# Patient Record
Sex: MALE | Race: WHITE | NOT HISPANIC OR LATINO | Employment: FULL TIME | URBAN - METROPOLITAN AREA
[De-identification: names, ages, dates, MRNs, and addresses within clinical notes are randomized per-mention and may not be internally consistent; named-entity substitution may affect disease eponyms.]

---

## 2019-12-14 ENCOUNTER — HOSPITAL ENCOUNTER (OUTPATIENT)
Dept: INFUSION THERAPY | Facility: HOSPITAL | Age: 44
Setting detail: RECURRING SERIES
Discharge: HOME | End: 2019-12-31
Attending: INTERNAL MEDICINE

## 2020-01-15 ENCOUNTER — OFFICE VISIT CONVERTED (OUTPATIENT)
Dept: PULMONOLOGY | Facility: CLINIC | Age: 45
End: 2020-01-15
Attending: PHYSICIAN ASSISTANT

## 2020-01-15 ENCOUNTER — HOSPITAL ENCOUNTER (OUTPATIENT)
Dept: LAB | Facility: HOSPITAL | Age: 45
Discharge: HOME | End: 2020-01-15
Attending: PHYSICIAN ASSISTANT

## 2020-01-16 LAB
INR PPP: 1.37 (ref 2–3)
PROTHROMBIN TIME: 14.1 S (ref 9.4–12)

## 2021-01-13 ENCOUNTER — HOSPITAL ENCOUNTER (OUTPATIENT)
Dept: LAB | Facility: HOSPITAL | Age: 46
Discharge: HOME | End: 2021-01-13
Attending: NURSE PRACTITIONER

## 2021-01-13 ENCOUNTER — OFFICE VISIT CONVERTED (OUTPATIENT)
Dept: FAMILY MEDICINE CLINIC | Facility: CLINIC | Age: 46
End: 2021-01-13
Attending: NURSE PRACTITIONER

## 2021-01-13 LAB
ALBUMIN SERPL-MCNC: 3.7 G/DL (ref 3.5–5)
ALBUMIN/GLOB SERPL: 0.8 {RATIO} (ref 1.4–2.6)
ALP SERPL-CCNC: 103 U/L (ref 53–128)
ALT SERPL-CCNC: 21 U/L (ref 10–40)
ANION GAP SERPL CALC-SCNC: 18 MMOL/L (ref 8–19)
AST SERPL-CCNC: 18 U/L (ref 15–50)
BASOPHILS # BLD AUTO: 0.06 10*3/UL (ref 0–0.2)
BASOPHILS NFR BLD AUTO: 0.6 % (ref 0–3)
BILIRUB SERPL-MCNC: 0.63 MG/DL (ref 0.2–1.3)
BNP SERPL-MCNC: 1152 PG/ML (ref 0–450)
BUN SERPL-MCNC: 16 MG/DL (ref 5–25)
BUN/CREAT SERPL: 17 {RATIO} (ref 6–20)
CALCIUM SERPL-MCNC: 9.4 MG/DL (ref 8.7–10.4)
CHLORIDE SERPL-SCNC: 101 MMOL/L (ref 99–111)
CONV ABS IMM GRAN: 0.05 10*3/UL (ref 0–0.2)
CONV CO2: 23 MMOL/L (ref 22–32)
CONV IMMATURE GRAN: 0.5 % (ref 0–1.8)
CONV TOTAL PROTEIN: 8.2 G/DL (ref 6.3–8.2)
CREAT UR-MCNC: 0.96 MG/DL (ref 0.7–1.2)
DEPRECATED RDW RBC AUTO: 44.3 FL (ref 35.1–43.9)
EOSINOPHIL # BLD AUTO: 0.25 10*3/UL (ref 0–0.7)
EOSINOPHIL # BLD AUTO: 2.5 % (ref 0–7)
ERYTHROCYTE [DISTWIDTH] IN BLOOD BY AUTOMATED COUNT: 13.7 % (ref 11.6–14.4)
EST. AVERAGE GLUCOSE BLD GHB EST-MCNC: 223 MG/DL
GFR SERPLBLD BASED ON 1.73 SQ M-ARVRAT: >60 ML/MIN/{1.73_M2}
GLOBULIN UR ELPH-MCNC: 4.5 G/DL (ref 2–3.5)
GLUCOSE SERPL-MCNC: 211 MG/DL (ref 70–99)
HBA1C MFR BLD: 9.4 % (ref 3.5–5.7)
HCT VFR BLD AUTO: 52.2 % (ref 42–52)
HGB BLD-MCNC: 16.2 G/DL (ref 14–18)
LYMPHOCYTES # BLD AUTO: 1.76 10*3/UL (ref 1–5)
LYMPHOCYTES NFR BLD AUTO: 17.8 % (ref 20–45)
MCH RBC QN AUTO: 27.3 PG (ref 27–31)
MCHC RBC AUTO-ENTMCNC: 31 G/DL (ref 33–37)
MCV RBC AUTO: 87.9 FL (ref 80–96)
MONOCYTES # BLD AUTO: 0.57 10*3/UL (ref 0.2–1.2)
MONOCYTES NFR BLD AUTO: 5.8 % (ref 3–10)
NEUTROPHILS # BLD AUTO: 7.18 10*3/UL (ref 2–8)
NEUTROPHILS NFR BLD AUTO: 72.8 % (ref 30–85)
NRBC CBCN: 0 % (ref 0–0.7)
OSMOLALITY SERPL CALC.SUM OF ELEC: 293 MOSM/KG (ref 273–304)
PLATELET # BLD AUTO: 220 10*3/UL (ref 130–400)
PMV BLD AUTO: 10.3 FL (ref 9.4–12.4)
POTASSIUM SERPL-SCNC: 4.3 MMOL/L (ref 3.5–5.3)
RBC # BLD AUTO: 5.94 10*6/UL (ref 4.7–6.1)
SODIUM SERPL-SCNC: 138 MMOL/L (ref 135–147)
WBC # BLD AUTO: 9.87 10*3/UL (ref 4.8–10.8)

## 2021-01-22 ENCOUNTER — CONVERSION ENCOUNTER (OUTPATIENT)
Dept: CARDIOLOGY | Facility: CLINIC | Age: 46
End: 2021-01-22
Attending: INTERNAL MEDICINE

## 2021-01-27 ENCOUNTER — OFFICE VISIT CONVERTED (OUTPATIENT)
Dept: FAMILY MEDICINE CLINIC | Facility: CLINIC | Age: 46
End: 2021-01-27
Attending: NURSE PRACTITIONER

## 2021-01-27 ENCOUNTER — CONVERSION ENCOUNTER (OUTPATIENT)
Dept: FAMILY MEDICINE CLINIC | Facility: CLINIC | Age: 46
End: 2021-01-27

## 2021-01-28 ENCOUNTER — OFFICE VISIT CONVERTED (OUTPATIENT)
Dept: ONCOLOGY | Facility: HOSPITAL | Age: 46
End: 2021-01-28
Attending: INTERNAL MEDICINE

## 2021-01-28 ENCOUNTER — HOSPITAL ENCOUNTER (OUTPATIENT)
Dept: ONCOLOGY | Facility: HOSPITAL | Age: 46
Discharge: HOME | End: 2021-01-28
Attending: INTERNAL MEDICINE

## 2021-01-29 LAB
ANTI-CARDIOLIPIN IGG ANTIBODY: 12 GPL U/ML (ref 0–14)
ANTI-CARDIOLIPIN IGM ANTIBODY: <9 MPL U/ML (ref 0–12)

## 2021-02-02 LAB — F5 GENE MUT ANL BLD/T: NORMAL

## 2021-02-25 ENCOUNTER — CONVERSION ENCOUNTER (OUTPATIENT)
Dept: FAMILY MEDICINE CLINIC | Facility: CLINIC | Age: 46
End: 2021-02-25

## 2021-02-25 ENCOUNTER — OFFICE VISIT CONVERTED (OUTPATIENT)
Dept: FAMILY MEDICINE CLINIC | Facility: CLINIC | Age: 46
End: 2021-02-25
Attending: NURSE PRACTITIONER

## 2021-03-02 ENCOUNTER — OFFICE VISIT CONVERTED (OUTPATIENT)
Dept: CARDIOLOGY | Facility: CLINIC | Age: 46
End: 2021-03-02
Attending: INTERNAL MEDICINE

## 2021-04-26 ENCOUNTER — CONVERSION ENCOUNTER (OUTPATIENT)
Dept: FAMILY MEDICINE CLINIC | Facility: CLINIC | Age: 46
End: 2021-04-26

## 2021-04-26 ENCOUNTER — OFFICE VISIT CONVERTED (OUTPATIENT)
Dept: FAMILY MEDICINE CLINIC | Facility: CLINIC | Age: 46
End: 2021-04-26
Attending: NURSE PRACTITIONER

## 2021-04-26 ENCOUNTER — HOSPITAL ENCOUNTER (OUTPATIENT)
Dept: LAB | Facility: HOSPITAL | Age: 46
Discharge: HOME | End: 2021-04-26
Attending: NURSE PRACTITIONER

## 2021-04-26 LAB
ANION GAP SERPL CALC-SCNC: 17 MMOL/L (ref 8–19)
BUN SERPL-MCNC: 17 MG/DL (ref 5–25)
BUN/CREAT SERPL: 18 {RATIO} (ref 6–20)
CALCIUM SERPL-MCNC: 8.9 MG/DL (ref 8.7–10.4)
CHLORIDE SERPL-SCNC: 101 MMOL/L (ref 99–111)
CONV CO2: 25 MMOL/L (ref 22–32)
CREAT UR-MCNC: 0.95 MG/DL (ref 0.7–1.2)
EST. AVERAGE GLUCOSE BLD GHB EST-MCNC: 154 MG/DL
GFR SERPLBLD BASED ON 1.73 SQ M-ARVRAT: >60 ML/MIN/{1.73_M2}
GLUCOSE SERPL-MCNC: 113 MG/DL (ref 70–99)
HBA1C MFR BLD: 7 % (ref 3.5–5.7)
OSMOLALITY SERPL CALC.SUM OF ELEC: 290 MOSM/KG (ref 273–304)
POTASSIUM SERPL-SCNC: 4.1 MMOL/L (ref 3.5–5.3)
SODIUM SERPL-SCNC: 139 MMOL/L (ref 135–147)

## 2021-05-10 NOTE — H&P
History and Physical      Patient Name: Con Colón   Patient ID: [id number]   Sex: Male   YOB: 1975    Primary Care Provider: Laurie READ   Referring Provider: Laurie READ    Visit Date: March 2, 2021    Provider: Margarito Yuan MD   Location: [facility]   Location Address: [address] [place] [address], [address]   [place], [place]  [id number]   Location Phone: [phone]          Chief Complaint     Shortness of breath.    Enlarged heart.       History Of Present Illness  Consult requested by: Laurie READ   Con Colón is a 46 year old /White male with a history of super morbid obesity (BMI in the office today was 66.3), hypertension, borderline type 2 diabetes mellitus, and recurrent pulmonary emboli, now on chronic anticoagulation with Xarelto 20 mg daily. He was treated at [facility] in January 2021 for submassive pulmonary embolism and both his CTA of the chest and echocardiogram at that time revealed evidence of significant right heart strain and right ventricular enlargement. His estimated pulmonary arterial pressures were also elevated on the echo at that time. He states his first pulmonary embolism occurred clear back in 1995, and he admits to medication noncompliance with anticoagulation in the past. He states he would run out of anticoagulant medication and people would refuse to fill it for him. Since January, he does report good compliance with Xarelto and states he has been taking it regularly. Mr. Colón does not recall ever having any workup for hypercoagulable state, but states he was a  for 15 years and admits to extremely limited physical activity. He has had numerous DVTs throughout adulthood, and he is now agreeable to be maintained on long-term anticoagulation with Xarelto. His recent echocardiogram actually showed normal left ventricular systolic function and wall motion with no significant valvular disease. However,  he did have a moderately enlarged right ventricle and mildly reduced right ventricular systolic function, likely all secondary to cor pulmonale/chronic thromboembolic pulmonary hypertension. He is not currently seeing a pulmonary physician. Mr. Colón also reports a long-standing history of obstructive sleep apnea, and states he has been on CPAP therapy for approximately 10 years. He does report excellent compliance with CPAP therapy nightly. He has not experienced any episodes of chest pain/pressure or any palpitations, PND, lightheadedness, or syncope. The patient does have chronic moderate-to-severe bilateral lower extremity edema, which has been present for years and is likely multifactorial in etiology. He does not report any recent increase in his chronic lower extremity edema. The patient's recent EKG from 02/25/2021 was reviewed and shows sinus rhythm with right bundle branch block, left anterior fascicular block, low QRS voltage in the precordial leads, and a nonspecific T-wave abnormality in the anterolateral leads. His blood pressure was elevated in the office today at 146/86.   PAST MEDICAL & SURGICAL HISTORY: Recurrent pulmonary emboli and DVTs; Obstructive sleep apnea, on chronic CPAP therapy; COPD with chronic ongoing tobacco abuse; Hypertension; Super morbid obesity with a BMI of 66.3 today; Hyperlipidemia; Right ventricular enlargement/failure secondary to cor pulmonale; Type 2 diabetes mellitus; No prior surgeries reported.   PSYCHOSOCIAL HISTORY: He continues to smoke and states he smokes approximately 1 pack of cigarettes daily. No history of alcohol abuse or illicit drug use reported. He is  and lives alone.   FAMILY HISTORY: Negative for premature coronary artery disease. Positive for diabetes mellitus in his mother.   CURRENT MEDICATIONS: Xarelto 20 mg daily; metformin 1,000 mg b.i.d.; lisinopril 40 mg daily.   ALLERGIES: No known drug allergies.       Review of  "Systems  · Constitutional  o Admits  o : good general health lately, recent weight changes   o Denies  o : fatigue  · Eyes  o Denies  o : blurred vision  · HENT  o Denies  o : hearing loss or ringing, chronic sinus problem, swollen glands in neck  · Cardiovascular  o Admits  o : shortness of breath with activities   o Denies  o : chest pain, palpitations (fast, fluttering, or skipping beats), swelling (feet, ankles, hands)  · Respiratory  o Admits  o : asthma or wheezing, COPD  o Denies  o : chronic or frequent cough  · Gastrointestinal  o Denies  o : ulcers, nausea or vomiting  · Neurologic  o Denies  o : lightheaded or dizzy, stroke, headaches  · Musculoskeletal  o Denies  o : joint pain, back pain  · Endocrine  o Admits  o : diabetes  o Denies  o : thyroid disease, heat or cold intolerance, excessive thirst or urination  · Heme-Lymph  o Denies  o : bleeding or bruising tendency, anemia      Vitals  Date Time BP Position Site L\R Cuff Size HR RR TEMP (F) WT  HT  BMI kg/m2 BSA m2 O2 Sat FR L/min FiO2 HC       03/02/2021 10:56 /86 Sitting    82 - R   448lbs 16oz 5'  9\" 66.31 3.15       03/02/2021 10:56 /86 Sitting                       Physical Examination  · Constitutional  o Appearance  o : Well-developed, well-nourished, morbidly obese, alert, oriented x3, in no acute distress.  · Head and Face  o HEENT  o : Atraumatic. Pupils equal, round, and reactive to light. No scleral icterus. Conjunctiva are normal. Mucous membranes are moist.   · Neck  o Inspection/Palpation  o : Supple. No masses.   o Jugular Veins  o : No JVD. No carotid bruit.  · Respiratory  o Auscultation of Lungs  o : Clear to auscultation bilaterally with decreased air movement in the bases and lower lung fields bilaterally. Prolonged expiratory phase. No wheezing, or rales. No tachypnea. Normal effort with no accessory muscle use.   · Cardiovascular  o Heart  o : Regular rate and rhythm. Distant heart sounds. S1 and S2 present. No S3 " or S4 gallop. No murmur or friction rub appreciated.  · Gastrointestinal  o Abdominal Examination  o : Soft, obese, nondistended, nontender. Normal bowel sounds throughout. No masses. No hepatomegaly.  · Lymphatic  o Neck  o :   o Axilla  o : No cervical or axillary lymphadenopathy.   · Skin and Subcutaneous Tissue  o General Inspection  o : Warm and dry. No jaundice. No rashes or lesions. Normal skin turgor.   · Neurologic  o Cranial Nerves  o : Normal speech. Cranial nerves II-XII grossly intact. No focal motor deficits.   · Extremities  o Extremities  o : No cyanosis, or clubbing. 2 to 3+ bilateral lower extremity edema to the knee. 2+ radial pulses bilaterally.   · EKG  o EKG  o : EKG from 02/25/2021 was reviewed and showed sinus rhythm with a heart rate of 87 beats per minute, right bundle branch block, left anterior fascicular block, low QRS voltage in the precordial leads, and a diffuse nonspecific T-wave abnormality, as well as a prolonged QT interval.  · Labs  o Labs  o : Labs from 11/16/2020 were reviewed. CBC: WBC 9.33, hemoglobin 14.8, hematocrit 46.2, platelets 196,000. Chemistry: Sodium 149, potassium 4.2, chloride 104, bicarb 24, BUN 11, creatinine 0.96, glucose 204. LFTs were within normal limits. BNP was elevated at that time at 1325.          Assessment     1.  Super morbid obesity with a BMI of 66.3 today.  2.  History of recurrent DVTs and pulmonary emboli, now finally compliant with chronic anticoagulation with        Xarelto.    3.  Moderately enlarged right ventricle with mildly reduced right ventricular systolic function and evidence of        pulmonary hypertension on the patient's recent echocardiogram.  These findings are likely secondary to cor        pulmonale/chronic thromboembolic pulmonary hypertension from his multiple previous PEs.    4.  Type 2 diabetes mellitus.  5.  Essential hypertension.  6.  Chronic ongoing tobacco abuse.  7.  Chronic exertional dyspnea, class III.  8.   Obstructive sleep apnea, on chronic CPAP therapy.       Plan     1.  His chronic exertional dyspnea is likely secondary to his multiple pulmonary issues and cor pulmonale, but        could represent an anginal equivalent.  Unfortunately, his weight exceeds the table limit for a nuclear stress        test, a stress echocardiogram, or a coronary CTA.  Accordingly, his only option for cardiac ischemic        evaluation would be an invasive catheterization, which he wishes to defer at this time.  I did inform him        that he would likely benefit from a right heart catheterization at some point to definitively diagnosis his        pulmonary hypertension and determine the severity of it.  I recommended a Pulmonary workup for chronic        thromboembolic pulmonary hypertension and a followup in the Pulmonary Clinic, which he declines at        present.   2.  He does have evidence of volume overload today and is willing to start a trial of low-dose Lasix.  He states        he has been on this medication in the past that he took from one of his friends.  We will start Lasix 40 mg        daily with potassium chloride supplementation.  Unfortunately, besides diuresis, I have little to offer him at        present for his chronic right-sided heart failure.    3.  He does understand the importance of strict compliance with chronic anticoagulation with Xarelto, as well        as continuous strict compliance with CPAP therapy.  We did review the long-term complications of        untreated sleep apnea, including atrial fibrillation and other arrhythmias, as well as worsening pulmonary        hypertension.  4.  Continue lisinopril at his current dose.  5.  Significant weight loss was strongly recommended and some strategies were reviewed to assist with this        goal.    6.  I will plan to see him back in the office in approximately 6 months for reassessment and told him to call if        he has new problems in the  meantime.      Margarito Yuan MD  BP:vm                  Electronically Signed by: Stephanie Frank-, Other -Author on March 8, 2021 05:09:06 PM  Electronically Co-signed by: Margarito Yuan MD -Reviewer on March 30, 2021 03:06:37 PM

## 2021-05-10 NOTE — PROCEDURES
"   Procedure Note      Patient Name: Con Colón   Patient ID: [id number]   Sex: Male   YOB: 1975    Primary Care Provider: Laurie READ   Referring Provider: Laurie READ    Visit Date: January 22, 2021    Provider: Marija Anne MD   Location: [facility]   Location Address: [address] [place] [address], [address]   [place], [place]  [id number]   Location Phone: [phone]          FINAL REPORT   TRANSTHORACIC ECHOCARDIOGRAM REPORT    Diagnosis: Shortness of breath   Height: 5'11\" Weight: 459 B/P: 182/109 BSA: 3.0   Tech: BNS   MEASUREMENTS:  RVID (Diastole) : RVID. (NORMAL: 0.7 to 2.4 cm max)   LVID (Systole): 4.3 cm (Diastole): 5.3 cm . (NORMAL: 3.7 - 5.4 cm)   Posterior Wall Thickness (Diastole): 1.6 cm. (NORMAL: 0.8 - 1.1 cm)   Septal Thickness (Diastole): 1.6 cm. (NORMAL: 0.7 - 1.2 cm)   LAID (Systole): 4.3 cm. (NORMAL: 1.9 - 3.8 cm)   Aortic Root Diameter (Diastole): 3.7 cm. (NORMAL: 2.0 - 3.7 cm)   COMMENTS:  The patient underwent 2-D, M-Mode, and Doppler examination, including pulse-wave, continuous-wave, and color-flow analysis; the study is technically limited. Lumason contrast was used for better left ventricle endocardial definition. The patient was administered 4 mL of 5 mL Lumason intravenously.   FINDINGS:  MITRAL VALVE: Normal. E to F slope was normal. No evidence of any prolapse.   AORTIC VALVE: Appears normal., trileaflet, opening adequately.   TRICUSPID VALVE: Not well seen.   PULMONIC VALVE: Not seen.   LEFT ATRIUM: Normal; no masses seen. LA volume is 18 mL/m2.   AORTIC ROOT: Normal in size and motion.   LEFT VENTRICLE: The left ventricular chamber size is normal. The left ventricular wall thickness is normal. The left ventricular systolic function is normal with an estimated EF of 60%. No significant regional wall motion abnormalities are identified.   RIGHT ATRIUM: Enlarged.   RIGHT VENTRICLE: Enlarged.   PERICARDIUM: No effusion.   INFERIOR VENA CAVA: Diameter " is 2.2 cm with less than 50% reduction with inspiration.   DOPPLER: Pulse-wave, continuous wave, and color-flow Doppler evaluation was performed. E/A ratio is 0.8. DT= 194 msec. E/E' is 5. The study is technically limited. No definite pulmonary abnormalities noted.   Faxed: 01/27/2021      CONCLUSION:  1.  Technically limited echocardiographic study.   2.  Left ventricular chamber size is normal. The left ventricular wall thickness is normal. The left ventricular        systolic function is normal with an estimated EF of 60%. No significant regional wall motion abnormalities        are identified.   3.  Left ventricular diastolic dysfunction.   4.  Right ventricle enlargement with elevated right atrial pressure.       Marija Anne MD, [facility]  PM/pap                     Electronically Signed by: Bhavani Hernandez-, Other -Author on January 27, 2021 11:23:10 AM  Electronically Co-signed by: Marija Anne MD -Reviewer on January 31, 2021 12:12:08 PM

## 2021-05-14 VITALS
SYSTOLIC BLOOD PRESSURE: 146 MMHG | HEART RATE: 82 BPM | WEIGHT: 315 LBS | DIASTOLIC BLOOD PRESSURE: 86 MMHG | BODY MASS INDEX: 46.65 KG/M2 | HEIGHT: 69 IN

## 2021-05-14 VITALS
SYSTOLIC BLOOD PRESSURE: 118 MMHG | RESPIRATION RATE: 22 BRPM | BODY MASS INDEX: 44.1 KG/M2 | DIASTOLIC BLOOD PRESSURE: 72 MMHG | OXYGEN SATURATION: 92 % | HEIGHT: 71 IN | WEIGHT: 315 LBS | HEART RATE: 90 BPM

## 2021-05-14 VITALS
DIASTOLIC BLOOD PRESSURE: 107 MMHG | SYSTOLIC BLOOD PRESSURE: 168 MMHG | HEART RATE: 102 BPM | SYSTOLIC BLOOD PRESSURE: 154 MMHG | WEIGHT: 315 LBS | BODY MASS INDEX: 44.1 KG/M2 | DIASTOLIC BLOOD PRESSURE: 92 MMHG | HEIGHT: 71 IN | OXYGEN SATURATION: 92 %

## 2021-05-14 VITALS
BODY MASS INDEX: 44.1 KG/M2 | DIASTOLIC BLOOD PRESSURE: 94 MMHG | HEIGHT: 71 IN | SYSTOLIC BLOOD PRESSURE: 151 MMHG | OXYGEN SATURATION: 92 % | HEART RATE: 97 BPM | WEIGHT: 315 LBS

## 2021-05-14 VITALS
DIASTOLIC BLOOD PRESSURE: 109 MMHG | WEIGHT: 315 LBS | SYSTOLIC BLOOD PRESSURE: 182 MMHG | HEART RATE: 113 BPM | OXYGEN SATURATION: 90 % | BODY MASS INDEX: 44.1 KG/M2 | HEIGHT: 71 IN

## 2021-05-14 NOTE — PROGRESS NOTES
Progress Note      Patient Name: Con Colón   Patient ID: [id number]   Sex: Male   YOB: 1975    Primary Care Provider: Laurie READ   Referring Provider: Laurie READ    Visit Date: January 27, 2021    Provider: JACEK Aguayo   Location: [facility]   Location Address: [address] [place] [address], [address]  [place], [place]  [id number]   Location Phone: [phone]          Chief Complaint  · 2 Week F/U      History Of Present Illness  Con Colón is a 46 year old /White male who presents for evaluation and treatment of:      Patient is here for a 2 week follow-up.   states he has been taking all 3 meds as prescribed and tolerating well   Patient says they told him he had an enlarged heart at the cardiologist   Patient says his medicine is working okay.       Past Medical History  Disease Name Date Onset Notes   Cardiomegaly --  --    COPD (chronic obstructive pulmonary disease) --  --    Hypertension --  --    Pulmonary embolism: past --  --    Sleep apnea --  --          Medication List  Name Date Started Instructions   lisinopril 20 mg oral tablet 01/27/2021 take 1 tablet (20 mg) by oral route once daily for 30 days   metformin 1,000 mg oral tablet 01/21/2021 take 1 tablet PO QD for 1 week, then BID thereafter   Xarelto 15 mg oral tablet  take 1 tablet (15 mg) by oral route 2 times per day with food for 21 days   Xarelto 20 mg oral tablet 01/21/2021 take 1 tablet (20 mg) by oral route once daily with the evening meal       Allergies Reconciled  Family Medical History  Disease Name Relative/Age Notes   Family history of diabetes mellitus  --          Social History  Finding Status Start/Stop Quantity Notes   Alcohol Never --/-- --  --    Sedentary --  --/-- --  --    Tobacco Current every day --/-- --  --          Immunizations  NameDate Admin Mfg Trade Name Lot Number Route Inj VIS Given VIS Publication   InfluenzaRefused 01/27/2021 NE Not  "Entered  NE NE     Comments:          Review of Systems  · Constitutional  o Denies  o : fatigue, night sweats  · Eyes  o Denies  o : double vision, blurred vision  · HENT  o Denies  o : vertigo, recent head injury  · Breasts  o Denies  o : abnormal changes in breast size, additional breast symptoms except as noted in the HPI  · Cardiovascular  o Denies  o : chest pain, irregular heart beats  · Respiratory  o Denies  o : shortness of breath, productive cough  · Gastrointestinal  o Denies  o : nausea, vomiting  · Genitourinary  o Denies  o : dysuria, urinary retention  · Integument  o Denies  o : hair growth change, new skin lesions  · Neurologic  o Denies  o : altered mental status, seizures  · Musculoskeletal  o Denies  o : joint swelling, limitation of motion  · Endocrine  o Denies  o : cold intolerance, heat intolerance  · Heme-Lymph  o Denies  o : petechiae, lymph node enlargement or tenderness  · Allergic-Immunologic  o Denies  o : frequent illnesses      Vitals  Date Time BP Position Site L\R Cuff Size HR RR TEMP (F) WT  HT  BMI kg/m2 BSA m2 O2 Sat FR L/min FiO2 HC       01/13/2021 08:54 /109 Sitting    113 - R   459lbs 4oz 5'  11\" 64.05 3.23 90 %  21%    01/27/2021 09:04 /94 Sitting    97 - R   448lbs 2oz 5'  11\" 62.5 3.19 92 %  21%          Physical Examination  · Constitutional  o Appearance  o : well developed, well-nourished, no acute distress  · Head and Face  o Head  o : normocephalic, atraumatic  · Ears, Nose, Mouth and Throat  o Ears  o :   § External Ears  § : external auditory canal appearance normal, no discharge present  § Otoscopic Examination  § : tympanic membranes pearly white/gray bilaterally  o Nose  o :   § External Nose  § : no lesions noted  § Nasopharynx  § : no discharge present  o Oral Cavity  o :   § Oral Mucosa  § : oral mucosa light pink  o Throat  o :   § Oropharynx  § : tonsils without exudate, no palatal petechiae  · Neck  o Inspection/Palpation  o : normal " appearance, no masses or tenderness, trachea midline  o Thyroid  o : gland size normal, nontender, no nodules or masses present on palpation  · Respiratory  o Respiratory Effort  o : breathing unlabored  o Inspection of Chest  o : chest rise symmetric bilaterally  o Auscultation of Lungs  o : clear to auscultation bilaterally throughout inspiration and expiration  · Cardiovascular  o Heart  o :   § Auscultation of Heart  § : regular rate and rhythm, no murmurs, gallops or rubs  o Peripheral Vascular System  o :   § Extremities  § : no edema  · Lymphatic  o Neck  o : no cervical lymphadenopathy, no supraclavicular lymphadenopathy  · Psychiatric  o Mood and Affect  o : mood normal, affect appropriate          Assessment  · Screening for depression     V79.0/Z13.89  · Diabetes mellitus, type 2     250.00/E11.9  · Essential hypertension     401.9/I10  still not at goal, increase lisinopril to 20mg QD   · Long term current use of anticoagulant     V58.61/Z79.01  · Obesity     278.00/E66.9  · Pulmonary embolism     415.19/I26.99      Plan  · Orders  o Annual depression screening, 15 minutes (, 64093) - V79.0/Z13.89 - 01/27/2021  o ACO-18: Negative screen for clinical depression using a standardized tool () - V79.0/Z13.89 - 01/27/2021  o ACO-39: Current medications updated and reviewed (, 1159F) - - 01/27/2021  · Medications  o lisinopril 20 mg oral tablet   SIG: take 1 tablet (20 mg) by oral route once daily for 30 days   DISP: (30) Tablet with 0 refills  Adjusted on 01/27/2021     o Medications have been Reconciled  o Transition of Care or Provider Policy  · Instructions  o Depression Screen completed and scanned into the EMR under the designated folder within the patient's documents.  o Today's PHQ-9 result is _0__  o Patient advised to monitor blood pressure (B/P) at home and journal readings. Patient informed that a B/P reading at home of more than 130/80 is considered hypertension. For readings greater  vbhe300/90 or higher patient is advised to follow up in the office with readings for management. Patient advised to limit sodium intake.  o Take all medications as prescribed/directed.  o Patient was educated/instructed on their diagnosis, treatment and medications prior to discharge from the clinic today.  o Patient instructed to seek medical attention urgently for new or worsening symptoms.  o Call the office with any concerns or questions.  o Chronic conditions reviewed and taken into consideration for today's treatment plan.  o Discussed Covid-19 precautions including, but not limited to, social distancing, avoid touching your face, and hand washing.   · Disposition  o Call or Return if symptoms worsen or persist.  o Follow Up PRN  o Follow Up in 1 month     cont current meds with increase dose lisinopril ,again stressed importance of compliance and weight loss.             Electronically Signed by: JACEK Aguayo -Author on January 27, 2021 01:43:45 PM

## 2021-05-14 NOTE — PROGRESS NOTES
"   Progress Note      Patient Name: Con Colón   Patient ID: [id number]   Sex: Male   YOB: 1975    Primary Care Provider: aLurie READ   Referring Provider: Laurie READ    Visit Date: 2021    Provider: JACEK Aguayo   Location: [facility]   Location Address: [address] [place] [address], [address]  [place], [place]  [id number]   Location Phone: [phone]          Chief Complaint  · New Patient  · Establish Care      History Of Present Illness  Con Colón is a 45 year old /White male who presents for evaluation and treatment of:      Patient is here to establish care. Patient was hospitalized at [facility]  for a PE. Patient was released and put on Warfrin and lovanox..   Patient states he is out of Warfrin and the blood thinner. Patient has been out of the medications for 1&1/2 month. 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   last time pt had a PCP was .     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    pt states DOT examiner told him he had glucose in urine and needed an A1C. pt states no personal hx of diabetes, does have family hx.     pt states hx of smoking but quit 1 year ago.    pt states hx of HTN, stopped med due to BP getting too low once and never restarted. that's was 3-4 years ago.    pt has had a prior PE, around 2019. states took Xarelto for a while then quit. unsure why he developed PE, states never had work-up to his knowledge, states \"they told me it was because I smoked\"       Past Medical History  Disease Name Date Onset Notes   Cardiomegaly --  --    COPD (chronic obstructive pulmonary disease) --  --    Hypertension --  --    Pulmonary embolism: past --  --    Sleep apnea --  --          Family Medical History  Disease Name Relative/Age Notes   Family history of diabetes mellitus  --          Social " "History  Finding Status Start/Stop Quantity Notes   Alcohol Never --/-- --  --    Sedentary --  --/-- --  --    Tobacco Current every day --/-- --  --          Review of Systems  · Constitutional  o Denies  o : fatigue, night sweats  · Eyes  o Denies  o : double vision, blurred vision  · HENT  o Denies  o : vertigo, recent head injury  · Breasts  o Denies  o : abnormal changes in breast size, additional breast symptoms except as noted in the HPI  · Cardiovascular  o Denies  o : chest pain, irregular heart beats  · Respiratory  o Admits  o : SOA with exertion   o Denies  o : productive cough  · Gastrointestinal  o Denies  o : nausea, vomiting  · Genitourinary  o Denies  o : dysuria, urinary retention  · Integument  o Denies  o : hair growth change, new skin lesions  · Neurologic  o Denies  o : altered mental status, seizures  · Musculoskeletal  o Denies  o : joint swelling, limitation of motion  · Endocrine  o Denies  o : cold intolerance, heat intolerance  · Heme-Lymph  o Denies  o : petechiae, lymph node enlargement or tenderness  · Allergic-Immunologic  o Denies  o : frequent illnesses      Vitals  Date Time BP Position Site L\R Cuff Size HR RR TEMP (F) WT  HT  BMI kg/m2 BSA m2 O2 Sat FR L/min FiO2 HC       01/13/2021 08:54 /109 Sitting    113 - R   459lbs 4oz 5'  11\" 64.05 3.23 90 %  21%           180/100       Physical Examination  · Constitutional  o Appearance  o : well developed, no acute distress, morbidly obese, disheveled appearance, strong body odor noted   · Head and Face  o Head  o : normocephalic, atraumatic  · Neck  o Inspection/Palpation  o : normal appearance, no masses or tenderness, trachea midline  o Thyroid  o : gland size normal, nontender, no nodules or masses present on palpation  · Respiratory  o Respiratory Effort  o : breathing unlabored  o Inspection of Chest  o : chest rise symmetric bilaterally  o Auscultation of Lungs  o : clear to auscultation bilaterally throughout inspiration " and expiration  · Cardiovascular  o Heart  o :   § Auscultation of Heart  § : regular rate and rhythm, no murmurs, gallops or rubs  o Peripheral Vascular System  o :   § Extremities  § : 2 plus pitting edema bilat with chronic appearing stasis dermatitis noted   · Lymphatic  o Neck  o : no cervical lymphadenopathy, no supraclavicular lymphadenopathy  · Psychiatric  o Mood and Affect  o : mood normal, affect appropriate          Assessment  · COPD (chronic obstructive pulmonary disease)     496/J44.9  · Essential hypertension     401.9/I10  start lisinopril 10mg, lower-end dose that I suspect will need to be titrated upward, but since pt reports hx of hypotensive episodes while on BP meds (unsure what it was) we will start with a lower dose.   · Obesity     278.00/E66.9  morbid- stressed need for diet/exercise changes and weight loss. pt states has gotten weight down to around 350 before, will work on it.   · Establishing care with new doctor, encounter for     V65.8/Z76.89  · Pulmonary embolism     415.19/I26.99  explained to pt that leaving this untreated as he has can lead to further complications including death. stressed to pt that he needs to get his health problems under control and take responsibility for his health. Pt states he has 3 kids to raise and needs to still be around for that so he will work on this .  · Stasis dermatitis of both legs     454.1/I87.2  pt states wears compression hose sometimes. Informed pt this problem is greatly exacerbated by his morbid obesity and that I highly recc weight loss.   · Pitting edema     782.3/R60.9  · Glucosuria     791.5/R81  pt states he has had glucose in his urine for years at DOT checks but that he is not diabetic. Informed pt that if he is frequently spilling glucose in his urine he is most likely diabetic and that this must be gotten under control quickly as uncontrolled diabetes could disqualify him from truck-driving. will check A1C today. pt JEREMY.    · Abnormal glucose     790.29/R73.09  · Non compliance w medication regimen     V1/Z.14  pt with extensive hx of non-compliance, admits will not be able to do PT/INR checks as needed for coumadin therapy- will initiate Xarelto for easiest dosing and hopefully compliance. stressed to pt that he must take anti-coag as prescribed and that he is literally risking his life if not.   · Non compliance with medical treatment     V1/Z.  pt has canclled or re-sched this appt 3 times from initial appt in November and has been on no anticoagulation (was only given 2 week supply in ER) or sought any medical treatment during that time. pt states he came today because they would not renew his DOT certificate when it  . I explained to pt this is non-compliance and that ultimately he is doing nothing but further hurting himself. 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 Instructed pt he not to return to  until his health is under control and he can be deemed safe to do so.   · SOB (shortness of breath)     786.05/R06.02      Plan  · Orders  o CBC with Auto Diff [facility] ([id number]) - 401.9/I10 - 2021  o CMP [facility] ([id number]) - 401.9/I10 - 2021  o Hgb A1c [facility] ([id number]) - - 2021  o ACO-39: Current medications updated and reviewed (1159F, ) - - 2021  o BNP blood (17475) - - 2021  o Echocardiogram - Complete [facility] ([id number], 69639, 75082) - 415.19/I26.99, 782.3/R60.9, 786.05/R06.02 - 2021  o Hematology / Oncology Consult (HEMOC) - 415.19/I26.99 - 2021   pt with hx 2 PEs, no hx of coag work-up per pt report   · Medications  o Medications have been Reconciled  o Transition of Care or Provider Policy  · Instructions  o Patient advised to monitor blood pressure (B/P) at home and journal readings. Patient informed that a B/P reading at home of more than  130/80 is considered hypertension. For readings greater gubw021/90 or higher patient is advised to follow up in the office with readings for management. Patient advised to limit sodium intake.  o Take all medications as prescribed/directed.  o Patient was educated/instructed on their diagnosis, treatment and medications prior to discharge from the clinic today.  o Patient instructed to seek medical attention urgently for new or worsening symptoms.  o Call the office with any concerns or questions.  o Chronic conditions reviewed and taken into consideration for today's treatment plan.  o Discussed Covid-19 precautions including, but not limited to, social distancing, avoid touching your face, and hand washing.   · Disposition  o Call or Return if symptoms worsen or persist.  o Follow Up PRN  o Follow Up in 2 weeks  o Follow Up in 1 month            Electronically Signed by: JACEK Aguayo -Author on January 14, 2021 10:27:04 AM

## 2021-05-14 NOTE — PROGRESS NOTES
"   Progress Note      Patient Name: Con Colón   Patient ID: [id number]   Sex: Male   YOB: 1975    Primary Care Provider: Laurie READ   Referring Provider: Laurie READ    Visit Date: February 25, 2021    Provider: JACEK Aguayo   Location: [facility] [place] [facility]   Location Address: [address] [place] [address], [address]  [place], [place]  [id number]   Location Phone: [phone]          Chief Complaint  · 1 month f/u      History Of Present Illness  Con Colón is a 46 year old /White male who presents for evaluation and treatment of:      Patient is here for a 1 month follow-up.    DM2- Patient states he has been checking his blood sugars and they have been around 115-140s   PE- Patient was also started on Xarelto. Patient states he is doing well on it.  HTN- Patient's Lisinopril dosage was increased. Patient does not check his blood pressure at home.     Patient states all he does is sit and watch tv. Patient states he gets winded if he does much else.     Patient states he has a hernia. states he was told about it in the hospital, denies any pain from it, just thought he would mention it.     Patient also has disability paperwork he wants filled out.    pt noted to get very SOB just coming down the [place] [address]- he however denies any worsening SOB and states \"I have COPD I have been like this for a long time\"       Past Medical History  Disease Name Date Onset Notes   Cardiomegaly --  --    COPD (chronic obstructive pulmonary disease) --  --    Hypertension --  --    Pulmonary embolism: past --  --    Sleep apnea --  --          Medication List  Name Date Started Instructions   lisinopril 40 mg oral tablet 02/25/2021 take 1 tablet (40 mg) by oral route once daily for 30 days   metformin 1,000 mg oral tablet 01/21/2021 take 1 tablet PO QD for 1 week, then BID thereafter   Xarelto 20 mg oral tablet 01/21/2021 take 1 tablet (20 mg) by oral route once daily with " "the evening meal       Allergies Reconciled  Family Medical History  Disease Name Relative/Age Notes   Family history of diabetes mellitus  --          Social History  Finding Status Start/Stop Quantity Notes   Alcohol Never --/-- --  --    Sedentary --  --/-- --  --    Tobacco Current every day --/-- --  --          Immunizations  NameDate Admin Mfg Trade Name Lot Number Route Inj VIS Given VIS Publication   InfluenzaRefused 01/27/2021 NE Not Entered  NE NE     Comments:          Review of Systems  · Constitutional  o Admits  o : fatigue   o Denies  o : fever, weight loss, weight gain  · Cardiovascular  o Admits  o : lower ext edema- chronic   o Denies  o : claudication, chest pressure, palpitations  · Respiratory  o Admits  o : SOB on exertion   o Denies  o : shortness of breath, wheezing, cough, hemoptysis,   · Gastrointestinal  o Denies  o : nausea, vomiting, diarrhea, constipation, abdominal pain      Vitals  Date Time BP Position Site L\R Cuff Size HR RR TEMP (F) WT  HT  BMI kg/m2 BSA m2 O2 Sat FR L/min FiO2 HC       01/13/2021 08:54 /109 Sitting    113 - R   459lbs 4oz 5'  11\" 64.05 3.23 90 %  21%    01/27/2021 09:04 /94 Sitting    97 - R   448lbs 2oz 5'  11\" 62.5 3.19 92 %  21%    02/25/2021 09:36 /107 Sitting    102 - R   452lbs 0oz 5'  11\" 63.04 3.2 92 %  21%    02/25/2021 10:11 /92 Sitting                       Physical Examination  · Constitutional  o Appearance  o : well developed, morbidly obese, somewhat unclean appearance, strong body odor,   · Head and Face  o Head  o : normocephalic, atraumatic  · Neck  o Inspection/Palpation  o : normal appearance, no masses or tenderness, trachea midline  o Thyroid  o : gland size normal, nontender, no nodules or masses present on palpation  · Respiratory  o Respiratory Effort  o : breathing unlabored  o Inspection of Chest  o : chest rise symmetric bilaterally  o Auscultation of Lungs  o : clear to auscultation bilaterally throughout " inspiration and expiration  · Cardiovascular  o Heart  o :   § Auscultation of Heart  § : regular rate and rhythm, no murmurs, gallops or rubs  o Peripheral Vascular System  o :   § Extremities  § : bilat pitting edema- chronic   · Lymphatic  o Neck  o : no cervical lymphadenopathy, no supraclavicular lymphadenopathy  · Psychiatric  o Mood and Affect  o : mood normal, affect appropriate, pt is pleasant               Assessment  · COPD (chronic obstructive pulmonary disease)     496/J44.9  · Essential hypertension     401.9/I10  discussed goal is 130/80 or less. increase lisinopril to 40mg QD   · Obesity     278.00/E66.9  morbid   · Abnormal EKG     794.31/R94.31  · Cardiac enlargement     429.3/I51.7  right ventricle and atrium enlarged on echo   · Pulmonary embolism     415.19/I26.99  currently on Xarelto- long hx of non-compliance, pt reports has been compliant with tx since this most recent PE (he however went approx. 6 weeks without anticoagulation after dx due to no-showing appts and was only given short supply from ER and told to f/u with primary care) he reports that he has not missed a single dose since I started him on the Xarelto   · SOB (shortness of breath)     786.05/R06.02      Plan  · Orders  o ACO-39: Current medications updated and reviewed (, 1159F) - - 02/25/2021  o CARDIOLOGY CONSULTATION (CARDI) - 401.9/I10, 794.31/R94.31, 429.3/I51.7, 415.19/I26.99 - 02/25/2021  o EKG (Recording only) [facility] ([id number]) - 401.9/I10, 786.05/R06.02 - 02/25/2021   machine read- sinus rhythm, probable left atrial enlargement, incomplete RBBB and LAFB , borderline prolonged QT interval   · Medications  o lisinopril 40 mg oral tablet   SIG: take 1 tablet (40 mg) by oral route once daily for 30 days   DISP: (30) Tablet with 1 refills  Adjusted on 02/25/2021     o Xarelto 20 mg oral tablet   SIG: take 1 tablet (20 mg) by oral route once daily with the evening meal   DISP: (30) Tablet with 2 refills  Refilled on  02/25/2021     o Medications have been Reconciled  o Transition of Care or Provider Policy  · Instructions  o Patient advised to monitor blood pressure (B/P) at home and journal readings. Patient informed that a B/P reading at home of more than 130/80 is considered hypertension. For readings greater rhkt582/90 or higher patient is advised to follow up in the office with readings for management. Patient advised to limit sodium intake.  o Take all medications as prescribed/directed.  o Patient was educated/instructed on their diagnosis, treatment and medications prior to discharge from the clinic today.  o Patient instructed to seek medical attention urgently for new or worsening symptoms.  o Call the office with any concerns or questions.  o Chronic conditions reviewed and taken into consideration for today's treatment plan.  · Disposition  o Call or Return if symptoms worsen or persist.  o Follow Up PRN  o Follow Up in 1 month            Electronically Signed by: JACEK Aguayo -Author on February 25, 2021 10:30:02 AM

## 2021-05-14 NOTE — PROGRESS NOTES
"   Progress Note      Patient Name: Con Colón   Patient ID: [id number]   Sex: Male   YOB: 1975    Primary Care Provider: Laurie READ   Referring Provider: Laurie READ    Visit Date: April 26, 2021    Provider: JACEK Aguayo   Location: [facility] [place]   Location Address: [address] [place] [address], [address]  [place], [place]  [id number]   Location Phone: [phone]          Chief Complaint  · f/u- HTN, DM2      History Of Present Illness  Con Colón is a 46 year old /White male who presents for evaluation and treatment of:      Patient is here today for a follow up. Patient is needing a r/f on Furosemide.     Patient is wanting to be cleared to go back to work.     Patient has a hx of     HTN- Lisinopril, well controlled at present   DM2- Metformin  HX of blood clots- Xarelto- pt states \"has not missed a dose\"   chronic edema- lasix 40mg once daily, started recently by cardio, no potassium supplement, states tolerating well, does seem to be helping some     Patient has not received a flu vaccine.     Patient is a former smoker. states he quit in Nov of 2019. states he has been dipping since then, but intends to quit that soon as well.    still working on weight loss       Past Medical History  Disease Name Date Onset Notes   Cardiomegaly --  --    COPD (chronic obstructive pulmonary disease) --  --    Hypertension --  --    Pulmonary embolism: past --  --    Sleep apnea --  --          Medication List  Name Date Started Instructions   furosemide 40 mg oral tablet  take 1 tablet (40 mg) by oral route once daily   lisinopril 40 mg oral tablet 04/26/2021 take 1 tablet (40 mg) by oral route once daily for 90 days   metformin 1,000 mg oral tablet 04/26/2021 take 1 tablet PO BID   Xarelto 20 mg oral tablet 04/26/2021 take 1 tablet (20 mg) by oral route once daily with the evening meal       Allergies Reconciled  Family Medical History  Disease Name " "Relative/Age Notes   Family history of diabetes mellitus  --          Social History  Finding Status Start/Stop Quantity Notes   Alcohol Never --/-- --  --    Sedentary --  --/-- --  --    Tobacco Current every day --/-- --  --          Immunizations  NameDate Admin Mfg Trade Name Lot Number Route Inj VIS Given VIS Publication   InfluenzaRefused 01/27/2021 NE Not Entered  NE NE     Comments:          Review of Systems  · Constitutional  o Admits  o : intentional weight loss   o Denies  o : fever, fatigue, weight gain  · Cardiovascular  o Denies  o : lower extremity edema, claudication, chest pressure, palpitations  · Respiratory  o Admits  o : dyspnea on exertion   o Denies  o : shortness of breath, wheezing, cough, hemoptysis,  · Gastrointestinal  o Denies  o : nausea, vomiting, diarrhea, constipation, abdominal pain      Vitals  Date Time BP Position Site L\R Cuff Size HR RR TEMP (F) WT  HT  BMI kg/m2 BSA m2 O2 Sat FR L/min FiO2 HC       01/13/2021 08:54 /109 Sitting    113 - R   459lbs 4oz 5'  11\" 64.05 3.23 90 %  21%    01/27/2021 09:04 /94 Sitting    97 - R   448lbs 2oz 5'  11\" 62.5 3.19 92 %  21%    02/25/2021 09:36 /107 Sitting    102 - R   452lbs 0oz 5'  11\" 63.04 3.2 92 %  21%    03/02/2021 10:56 /86 Sitting    82 - R   448lbs 16oz 5'  9\" 66.31 3.15       03/02/2021 10:56 /86 Sitting                 04/26/2021 09:37 /72 Sitting    90 - R 22  419lbs 8oz 5'  11\" 58.51 3.09 92 %  21%          Physical Examination  · Constitutional  o Appearance  o : well developed, well-nourished, super obese, no acute distress  · Head and Face  o Head  o : normocephalic, atraumatic  · Ears, Nose, Mouth and Throat  o Ears  o :   § External Ears  § : external auditory canal appearance normal, no discharge present  § Otoscopic Examination  § : tympanic membranes pearly white/gray bilaterally  o Nose  o :   § External Nose  § : no lesions noted  § Nasopharynx  § : no discharge present  o Oral " Cavity  o :   § Oral Mucosa  § : oral mucosa light pink  o Throat  o :   § Oropharynx  § : tonsils without exudate, no palatal petechiae  · Neck  o Inspection/Palpation  o : normal appearance, no masses or tenderness, trachea midline  o Thyroid  o : gland size normal, nontender, no nodules or masses present on palpation  · Respiratory  o Respiratory Effort  o : breathing unlabored  o Inspection of Chest  o : chest rise symmetric bilaterally  o Auscultation of Lungs  o : clear to auscultation bilaterally throughout inspiration and expiration  · Cardiovascular  o Heart  o :   § Auscultation of Heart  § : regular rate and rhythm, no murmurs, gallops or rubs  o Peripheral Vascular System  o :   § Extremities  § : no edema  · Lymphatic  o Neck  o : no cervical lymphadenopathy, no supraclavicular lymphadenopathy  · Psychiatric  o Mood and Affect  o : mood normal, affect appropriate          Assessment  · Diabetes mellitus, type 2     250.00/E11.9  last A1C 9.4% 3 months ago, pt states has been taking metformin regularly, will re-check today   · Essential hypertension     401.9/I10  pt BP very well controlled today- cont current meds   · Long term current use of anticoagulant     V58.61/Z79.01  · Obesities, morbid     278.01/E66.01  pt working on weight loss, down approx. 40lbs, congratulated on weight loss and encouraged to continue   · Pulmonary embolism     415.19/I26.99  · Lower extremity edema     782.3/R60.0  will RF Lasix and check BMP   · Pulmonary hypertension     416.8/I27.20      Plan  · Orders  o Hgb A1c [facility] ([id number]) - 250.00/E11.9 - 04/26/2021  o BMP [facility] ([id number]) - 401.9/I10 - 04/26/2021  o ACO-14: Influenza immunization was not administered for reasons documented [facility] () - - 04/26/2021  o ACO-39: Current medications updated and reviewed (1159F, ) - - 04/26/2021  o PULMONARY CONSULTATION (PULMO) - 415.19/I26.99, V58.61/Z79.01, 416.8/I27.20 - 04/26/2021  · Medications  o metformin 1,000 mg oral tablet    SIG: take 1 tablet PO BID   DISP: (180) Tablet with 0 refills  Adjusted on 04/26/2021     o lisinopril 40 mg oral tablet   SIG: take 1 tablet (40 mg) by oral route once daily for 90 days   DISP: (90) Tablet with 0 refills  Refilled on 04/26/2021     o Xarelto 20 mg oral tablet   SIG: take 1 tablet (20 mg) by oral route once daily with the evening meal   DISP: (90) Tablet with 0 refills  Refilled on 04/26/2021     o Medications have been Reconciled  o Transition of Care or Provider Policy  · Instructions  o Discussed with patient blood pressure monitoring, hemoglobin A1C levels need to be below 7.0, and LDL (Lipid) goals below 70.  o Patient advised to monitor blood pressure (B/P) at home and journal readings. Patient informed that a B/P reading at home of more than 130/80 is considered hypertension. For readings greater pfse347/90 or higher patient is advised to follow up in the office with readings for management. Patient advised to limit sodium intake.  o Patient is taking medications as prescribed and doing well.   o Take all medications as prescribed/directed.  o Patient was educated/instructed on their diagnosis, treatment and medications prior to discharge from the clinic today.  o Patient instructed to seek medical attention urgently for new or worsening symptoms.  o Call the office with any concerns or questions.  o Chronic conditions reviewed and taken into consideration for today's treatment plan.  · Disposition  o Call or Return if symptoms worsen or persist.  o Follow Up PRN  o Follow Up in 3 months     Lasix 40mg 1 tab PO QD #30/1rf sent from face-sheet     informed pt he will need clearance from Pulmonary pertaining to his PE per the DOT guidelines before being cleared to return to driving. pt VU                   Electronically Signed by: JACEK Aguayo -Author on April 26, 2021 10:20:38 AM

## 2021-05-28 VITALS
DIASTOLIC BLOOD PRESSURE: 81 MMHG | BODY MASS INDEX: 46.65 KG/M2 | HEART RATE: 86 BPM | TEMPERATURE: 97.3 F | RESPIRATION RATE: 22 BRPM | WEIGHT: 315 LBS | SYSTOLIC BLOOD PRESSURE: 131 MMHG | HEIGHT: 69 IN | OXYGEN SATURATION: 96 %

## 2021-05-28 VITALS
BODY MASS INDEX: 44.1 KG/M2 | HEART RATE: 77 BPM | OXYGEN SATURATION: 95 % | SYSTOLIC BLOOD PRESSURE: 142 MMHG | TEMPERATURE: 97.9 F | DIASTOLIC BLOOD PRESSURE: 98 MMHG | WEIGHT: 315 LBS | HEIGHT: 71 IN | RESPIRATION RATE: 18 BRPM

## 2021-05-28 NOTE — PROGRESS NOTES
Patient: BASIA YE     Acct: [id number]     Report: #[id number]  UNIT #: [id number]     : 1975    Encounter Date:01/15/2020  PRIMARY CARE: Laurie William  ***Signed***  --------------------------------------------------------------------------------------------------------------------  Chief Complaint      Encounter Date      Angel 15, 2020            Primary Care Provider            unknown            Referring Provider            [facility]            Patient Complaint      Patient is complaining of      Pt here for [facility] f/u            VITALS      Height 5 ft 11 in / 180.34 cm      Weight 391 lbs 2 oz / 177.373077 kg      BSA 2.80 m2      BMI 54.6 kg/m2      Temperature 97.9 F / 36.61 C - Oral      Pulse 77      Respirations 18      Blood Pressure 142/98 Sitting, Right Arm      Pulse Oximetry 95%, room air            HPI      The patient is a super morbidly obese 44 year old white male recently seen by     Dr. Freed while hospitalized at [facility] on . He     presented there with worsening dyspnea, chest tightness and had had a previous     history of pulmonary embolism and deep vein thrombosis for which he had     completed 6 months of anticoagulation. He was now found to have bilateral PEs     and left lower extremity deep vein thrombosis and he was off of anticoagulation     at the time of his admission. He was a half pack per day smoker and states he     has quit smoking since his hospital discharge. He is a  and lives a     fairly sedentary lifestyle and recently moved to [place] from [place]. He     was started on heparin and transitioned to Coumadin as it was felt his BMI at 55    made him too large to safely be anticoagulated with Xarelto or Eliquis. He was     discharged with a 5 day Lovenox bridge of 150mg bid, but had to return to     outpatient nursing services at [facility] to receive each Lovenox injection, likely due    to his lack of insurance coverage.  He was noncompliant with his follow up     appointments in our office, cancelling and rescheduling appointments with me at     least twice, and is here today over a month since his hospital discharge for     follow up. He is here because he has run out of Coumadin and states he has been     off of Coumadin for 2 days. He was taking 10 mg daily, but has not followed up     with a PCP or had his INR checked since his hospital discharge. Unfortunately,     it also appears that his INR was never rechecked while on Lovenox bridging     either, and was subtherapeutic at 1.21 at his time of discharge. He is here     today denying any new complaints, denies increased dyspnea, coughing or wheezing    but still has mild exertional dyspnea. He denies chest pain, hemoptysis, fever     or chills. He denies hematuria or blood in his stool.             I reviewed the Review of Systems, medical, surgical and family history and agree    with those as entered.      Copies To:   JHON CORNEJO      Constitutional:  Denies: Fatigue, Fever, Weight gain, Weight loss, Chills,     Insomnia, Other      Respiratory/Breathing:  Denies: Shortness of air, Wheezing, Cough, Hemoptysis,     Pleuritic pain, Other      Endocrine:  Denies: Polydipsia, Polyuria, Heat/cold intolerance, Diabetes, Other      Eyes:  Denies: Blurred vision, Vision Changes, Other      Ears, nose, mouth, throat:  Denies: Mouth lesions, Thrush, Throat pain,     Hoarseness, Allergies/Hay Fever, Post Nasal Drip, Headaches, Recent Head Injury,    Nose Bleeding, Neck Stiffness, Thyroid Mass, Hearing Loss, Ear Fullness, Dry     Mouth, Nasal or Sinus Pain, Dry Lips, Nasal discharge, Nasal congestion, Other      Cardiovascular:  Denies: Palpitations, Syncope, Claudication, Chest Pain, Wake     up Gasping for air, Leg Swelling, Irregular Heart Rate, Cyanosis, Dyspnea on     Exertion, Other      Gastrointestinal:  Denies: Nausea, Constipation, Diarrhea, Abdominal pain,      Vomiting, Difficulty Swallowing, Reflux/Heartburn, Dysphagia, Jaundice,     Bloating, Melena, Bloody stools, Other      Genitourinary:  Denies: Urinary frequency, Incontinence, Hematuria, Urgency,     Nocturia, Dysuria, Testicular problems, Other      Musculoskeletal:  Denies: Joint Pain, Joint Stiffness, Joint Swelling, Myalgias,    Other      Hematologic/lymphatic:  DENIES: Lymphadenopathy, Bruising, Bleeding tendencies,     Other      Neurological:  Denies: Headache, Numbness, Weakness, Seizures, Other      Psychiatric:  Denies: Anxiety, Appropriate Effect, Depression, Other      Sleep:  No: Excessive daytime sleep, Morning Headache?, Snoring, Insomnia?, Stop    breathing at sleep?, Other      Integumentary:  Denies: Rash, Dry skin, Skin Warm to Touch, Other      Immunologic/Allergic:  Denies: Latex allergy, Seasonal allergies, Asthma,     Urticaria, Eczema, Other      Immunization status:  No: Up to date            FAMILY/SOCIAL/MEDICAL HX      Surgical History:  Yes: Orthopedic Surgery (4TH FINGER LEFT HAND PINS ); No:     Abdominal Surgery, Appendectomy, Bladder Surgery, Bowel Surgery, CABG,     Cholecystectomy, Head Surgery, Oral Surgery, Vascular Surgery      Heart - Family Hx:  Uncle      Diabetes - Family Hx:  Mother      Is Father Still Living?:  Yes      Is Mother Still Living?:  Yes      Social History:  No Tobacco Use, No Alcohol Use, No Recreational Drug use      Smoking status:  Former smoker (.5 ppd x 29 years, quit 10/19)      Anticoagulation Therapy:  Yes      Antibiotic Prophylaxis:  No      Medical History:  Yes: Arthritis, Asthma, Chronic Bronchitis/COPD, Congestive     Heart Failu, Shortness Of Breath, Miscellaneous Medical/oth (HX OF DVT, AND PE,     HAS BEEN TOLD HE HAS IRREGULAR HEART BEAT, IT SKIPS BEAT); No: Blood Disease,     Chemotherapy/Cancer, Diabetes, Heart Attack, Hemorrhoids/Rectal Prob, High Blood    Pressure      Psychiatric History      none            PREVENTION      Hx  Influenza Vaccination:  Yes      Date Influenza Vaccine Given:  Oct 1, 1997      Influenza Vaccine Declined:  Yes      2 or More Falls Past Year?:  No      Fall Past Year with Injury?:  No      Hx Pneumococcal Vaccination:  No      Encouraged to follow-up with:  PCP regarding preventative exams.      Chart initiated by      Sera Crawley MA            ALLERGIES/MEDICATIONS      Allergies:        Coded Allergies:             Nuvigil (Verified  Allergy, Intermediate, 1/15/20)           Provigil (Verified  Allergy, Intermediate, 1/15/20)      Medications    Last Reconciled on 1/15/20 16:12 by JORI MILLER      Warfarin Sod (Coumadin) 10 Mg Tablet      10 MG PO QDAY@16 for 30 Days, #30 TAB         Prov: RA DUQUE         12/13/19       Albuterol/Ipratropium (Duoneb) 3 Ml Ampul.neb      3 ML INH RTQ6H WA for 30 Days, #90 NEB         Prov: RA DUQUE         12/13/19      Current Medications      Current Medications Reviewed 1/15/20            EXAM      VITAL SIGNS:  Reviewed.        NECK:  Supple without tracheal deviation or lymphadenopathy.  No thyromegaly     appreciated.      LYMPHATICS:  No cervical or supraclavicular lymphadenopathy.      HEENT: Pupils are equal, round and reactive to light. There is no scleral     icterus.  Nares patent without hypertrophy of the turbinates. No erythema of the    passages.  TMs are clear bilaterally with good cone of light. The posterior     pharynx is without  lesions or erythema.      RESPIRATORY:  Mildly decreased breath sounds throughout, no wheezes, rhonchi or     crackles, normal work of breathing noted.        CARDIOVASCULAR:  Regular rate and rhythm.  No murmurs, gallops or rubs.  No     lower extremity edema.  Equal radial pulses.        GI: Soft, nontender, nondistended, no organomegaly.  Bowel sounds present in all    four quadrants.      MUSCULOSKELETAL:  No joint effusions, erythema or warmth over the major joint     systems.      SKIN:  No rashes or  lesions.      NEUROLOGIC: Cranial nerves II-XII are intact bilaterally.  Moves all     extremities. Ambulates with ease.      PSYCH:  Appropriate mood and affect.      Vtials      Vitals:             Height 5 ft 11 in / 180.34 cm           Weight 391 lbs 2 oz / 177.972723 kg           BSA 2.80 m2           BMI 54.6 kg/m2           Temperature 97.9 F / 36.61 C - Oral           Pulse 77           Respirations 18           Blood Pressure 142/98 Sitting, Right Arm           Pulse Oximetry 95%, room air            REVIEW      Results Reviewed      PCCS Results Reviewed?:  Yes Prev Lab Results, Yes Prev Radiology Results, Yes     Previous Mecial Records      Lab Results      I personally reviewed the patient's most recent pulmonary consultation, progress    notes and discharge summary.      Radiographic Results               [facility]                PACS RADIOLOGY REPORT            Patient: BASIA YE   Acct: #[id number]   Report: #[id number]            UNIT #: [id number]    DOS: 12/10/19 2017      INSURANCE:**TYPE IN NAME OF INSURANCE**   ORDER #:[id number]      LOCATION:ER     : 1975            PROVIDERS      ADMITTING:     ATTENDING:       FAMILY:  MD LINETTE   ORDERING:  STEPHANIE BOWERS         OTHER:    DICTATING:  Dany Feldman MD, JR            REQ #:[id number]   EXAM:CHW - CT CHEST with CONTRAST      REASON FOR EXAM:  Dyspnea      REASON FOR VISIT:  MOJGAN            *******Signed******         PROCEDURE:   CT CHEST W/ CONTRAST             COMPARISON:   Psychiatric, , CHEST AP/PA 1 VIEW, 12/10/2019,     18:01.             INDICATIONS:   DYSPNEA, SHORTNESS OF AIR/BREATH.             TECHNIQUE:   After obtaining the patient's consent, 981 pulmonary CT/CTA images     were obtained with       non-ionic intravenous contrast material.               PROTOCOL:     Pulmonary embolism imaging protocol performed                 RADIATION:     DLP: 1975.3 mGy*cm          Automated exposure control was utilized to minimize radiation dose.       CONTRAST:   100 cc Isovue 370 I.V.      LABS:     eGFR: >60 ml/min/1.73m2             FINDINGS:   A large amount of acute pulmonary embolism is seen bilaterally with     associated right       ventricular strain and dilated pulmonary arteries.  The maximum main pulmonary     artery diameter is       4.5 cm, suggesting pulmonary arterial hypertension.  No saddle embolus is seen.     No definite acute       pulmonary infarcts are identified.  However, there are nonspecific ground-glass     opacities in the       left upper lobe and, to a lesser extent, in the right upper lobe and right lower    lobe.  Early       pulmonary infarcts would be among differential considerations.  No cardiac     enlargement is seen.  No       pleural or pericardial effusion is identified.  There are degenerative changes     of the imaged spine       at multiple levels.  Diffuse idiopathic skeletal hyperostosis (DISH) cannot be     excluded.  No acute       fracture is seen.  No pneumothorax is identified.  There is slight motion     artifact on the study.        There is diffuse hepatic steatosis.  A small hiatal hernia is possible.             CONCLUSION:   A large amount of acute pulmonary embolism is seen bilaterally.      No saddle embolus is       seen.  There are nonspecific pulmonary opacities bilaterally.  In the     differential diagnosis would       be early pulmonary infarcts.  There is CTA evidence for right ventricular     strain.               Pertinent findings were discussed with Dr. Choudhury (attending/ordering [facility] ED     physician) at       approximately 2103 hours on 12/10/2019.               DL DUVAL JR, MD             Electronically Signed and Approved By: DL DUVAL JR, MD on 12/10/2019 at     21:07                        Until signed, this is an unconfirmed preliminary report that may contain       errors and is subject to change.                                              KARSTEN:      D:12/10/19 2107            Assessment      Recurrent pulmonary embolism - I26.99            Therapeutic drug monitoring - Z51.81            Notes      New Medications      * Warfarin Sod (Coumadin*) 10 MG TABLET: 10 MG PO QDAY@16 7 Days #7      * Enoxaparin (Lovenox) 150 MG/1 ML SYRINGE: 150 MG SUBQ Q12H 5 Days #10      Discontinued Medications      * Enoxaparin (Lovenox) 150 MG/1 ML SYRINGE: 150 MG SUBQ Q12H 5 Days #3      New Diagnostics      * PT / INR, As Soon As Possible         Dx: Recurrent pulmonary embolism - I26.99      * PT / INR, 2 DAYS         Dx: Recurrent pulmonary embolism - I26.99      * Chest W/ Cont CT, 2 Months         Dx: Recurrent pulmonary embolism - I26.99      ASSESSMENT:      1. Recurrent bilateral pulmonary emboli, was on Coumadin with Lovenox bridging,     now noncompliant and off of Coumadin for 2 days, with recommendation for     lifelong anticoagulation.       2. Right heart strain.       3. Left deep vein thrombosis.       4. Tobacco abuse with cigarettes recently in remission.       5. Super morbid obesity with BMI 54.6.      6. Medical noncompliance.       7. Obstructive sleep apnea on nightly CPAP.            PLAN:      1. I have discussed with the patient the extreme importance of compliance with     his medications, especially anticoagulants, given his acute pulmonary emboli and    deep vein thrombosis. The patient verbalized understanding. I discussed with our     at length as our office does not typically manage Coumadin but     the patient has just recently moved here and does not have a primary care     provider yet. We have spent greater than 2 hours with the patient and working on    getting medications approved for him and working on PCP referral for the patient    today, and trying to see what his insurance coverage was and seeing if he could     obtain [place]  Medicaid which he apparently did not qualify for. We looked into    seeing if the patient could get emergency coverage for his medications as he     does not have active insurance coverage in the state of [place], and despite     numerous lengthy phone calls, none of these attempts have been successful.       2. I have discussed with Dr. Zheng regarding Coumadin and Lovenox management     and appreciate his input. The patient does need to be restarted on Coumadin and     restart Lovenox bridging as soon as possible given his acute PEs and DVT. I have    spoken with the clinical pharmacist at [facility] who confirmed     that recommended dosing would be for him to do Lovenox bridging with 150 mg     subcutaneous 12 hours for 5 days while restarting Coumadin 10 mg daily. The     pharmacist recommended checking his PT/INR today and then rechecking it on     01/17/2020 and I discussed these recommendations with the patient. He will also     need referral to hematology for hypercoagulable work up given his recurrent PEs     and DVTs and we are attempting to refer him to a primary care provider as soon     as possible as well. Management of his Coumadin will need to be done by his     primary care provider once he is established there as he will need lifelong     anticoagulation.       3. The patient will also need a follow up CT scan of the chest with contrast in     2 months, making this three months since his last CT scan was done, to ensure     resolution of pulmonary emboli. He is to continue on nightly CPAP for his NELY.       4. The patient does not currently have medical insurance in [place], making it     difficult for him to obtain his medications, so I have strongly recommend that     he go back to the ER and be readmitted so he can be restarted on Coumadin and be    bridged with Lovenox until his INR is therapeutic. His INR was never therapeutic    at the time of his discharge as it was 1.21  and was not rechecked while the     patient was on Lovenox bridging. Unfortunately, the patient was not set up for     outpatient INR checks and was not referred to a PCP at his time of discharge.     However despite discussing this with the patient in detail and answering all his    questions today, he is refusing to go back to the ER or to be readmitted. He     states he needs to get back to work as a  and he needs to get back     on the road tomorrow or the day after. I discussed the risks of medical     noncompliance and specifically discussed with him the risk of death with not     taking his anticoagulation as prescribed, including Lovenox bridging, for his     acute pulmonary emboli and he verbalized understanding and acceptance of this     risk.       5. I will have him follow up with me 2 days from now to recheck his INR and he     is to call sooner if needed.            Patient Education      Time Spent:  > 50% /Coord Care            Electronically signed by JOSEPH MCKEON PA-C  01/17/2020 09:36       Disclaimer: Converted document may not contain table formatting or lab diagrams. Please see [facility] [employer] [facility] for the authenticated document.

## 2021-05-28 NOTE — PROGRESS NOTES
Patient: BASIA YE     Acct: [id number]     Report: #[id number]  UNIT #: [id number]     : 1975    Encounter Date:2021  PRIMARY CARE: Laurie William  ***Signed***  --------------------------------------------------------------------------------------------------------------------  NURSE INTAKE      Visit Type      New Patient Visit            Chief Complaint      PE            Referring Provider/Copies To      Primary Care Provider:  Laurie William      Copies To:   Laurie William            History and Present Illness      Past Hx      Past medical history significant for       COPD       essential hypertension      Stasis dermatitis bilateral legs      DM      hx of PE and b/l dvt            HPI - Hematology Interim      Patient presents for evaluation for recurrent pulmonary embolus and DVT       Mr. 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 and has been     sent for evaluation for his recent history of pulmonary embolus()      Pt is morbidly obese 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)      Per patient he had a previous diagnosis of pulmonary embolus in  for which     he took 6 months of anticoagulation with no other thrombosis until the 2019 episode      Patient presents for evaluation and management of recurrent pulmonary embolus     and DVT            PAST, FAMILY   Past Medical History      Past  Medical History:  COPD, Diabetes Type 2, Hypertension      Hematology/Oncology (M):  Coagulopathy            Past Surgical History            SURGERY ON LEFT HAND            Family History      Family History:  No Family History            Social History      Marital Status:  Single      Lives independently:  Yes      Number of Children:  3            Tobacco Use      Tobacco status:  Former smoker      Currently Vaping:  No            Alcohol Use      Alcohol intake:  None            Substance Use      Substance use:  Denies use            REVIEW OF SYSTEMS      General:  Denies: Appetite Change, Fatigue, Fever, Night Sweats, Weight Gain,     Weight Loss      Eye:  Denies Blurred Vision, Denies Corrective Lenses, Denies Diplopia, Denies     Vision Changes      ENT:  Denies Headache, Denies Hearing Loss, Denies Hoarseness, Denies Sore     Throat      Cardiovascular:  Denies Chest Pain, Denies Palpitations      Respiratory:  Admits: Shortness of Air;          Denies: Cough, Coughing Blood, Productive Cough, Wheezing      Gastrointestinal:  Denies Bloody Stools, Denies Constipation, Denies Diarrhea,     Denies Nausea/Vomiting, Denies Problem Swallowing, Denies Unable to Control     Bowels      Genitourinary:  Denies Blood in Urine, Denies Incontinence, Denies Painful     Urination      Musculoskeletal:  Denies Back Pain, Denies Muscle Pain, Denies Painful Joints      Integumentary:  Denies Itching, Denies Lesions, Denies Rash      Neurologic:  Denies Dizziness, Denies Numbness\Tingling, Denies Seizures      Psychiatric:  Denies Anxiety, Denies Depression      Endocrine:  Denies Cold Intolerance, Denies Heat Intolerance      Hematologic/Lymphatic:  Denies Bruising, Denies Bleeding, Denies Enlarged Lymph     Nodes            VITAL SIGNS AND SCORES      Vitals      Height 69 in / 175.26 cm      Weight 451 lbs 15.088 oz / 205.0 kg      BSA 2.92 m2      BMI 66.7 kg/m2      Temperature 97.3 F / 36.28 C - Temporal      Pulse  86      Respirations 22      Blood Pressure 131/81 Sitting, Left Arm      Pulse Oximetry 96%, ROOM AIR            Pain Score      Experiencing any pain?:  No      Pain Scale Used:  Numerical      Pain Intensity:  0            Fatigue Score      Experiencing any fatigue?:  No      Fatigue (0-10 scale):  0 (none)            EXAM      General Appearance:  Positive for: Alert, Oriented x3, Cooperative      HEENT:  Positive for: Oropharynx clear      Respiratory:  Positive for: CTAB      Abdomen/Gastro:  Positive for: Normal Active Bowel Sounds, Soft      Cardiovascular:  Positive for: RRR      Psychiatric:  Positive for: AAO X 3      Lower Extremities:  Positive for: Edema (=venous stais b/l lower ext)            PREVENTION      Date Influenza Vaccine Given:  Oct 1, 1997      Influenza Vaccine Declined:  Yes      2 or More Falls in Past Year?:  No      Fall Past Year with Injury?:  No      Hx Pneumococcal Vaccination:  No      Encouraged to follow-up with:  PCP regarding preventative exams.      Chart initiated by      JEANE ENNIS            ALLERGY/MEDS      Allergies      Coded Allergies:             ARMODAFINIL (Verified  Allergy, Intermediate, 11/15/20)           MODAFINIL (Verified  Allergy, Intermediate, 11/15/20)            Medications      Last Reconciled on 1/15/20 16:12 by JORI MILLER      Rivaroxaban (Xarelto) 10 Mg Tablet      20 MG PO QDAY, #60 TAB 0 Refills         Reported         1/28/21       Metformin HCl (Metformin HCl) 1,000 Mg Tablet      1000 MG PO QDAY for 30 Days, #30 TAB         Reported         1/28/21       Lisinopril* (Lisinopril*) 10 Mg Tablet      10 MG PO QDAY, #30 TAB 0 Refills         Reported         1/28/21      Medications Reviewed:  Changes made to meds            IMPRESSION/PLAN      Impression      Recurrent  pulmonary embolism            Diagnosis      Recurrent pulmonary embolism - I26.99            Notes      New Medications      * Lisinopril* 10 MG TABLET: 10 MG  PO QDAY #30      * Metformin HCl 1,000 MG TABLET: 1,000 MG PO QDAY 30 Days #30      * Rivaroxaban (Xarelto) 10 MG TABLET: 20 MG PO QDAY #60      Discontinued Medications      * Enoxaparin (Lovenox) 40 MG/0.4 ML SYRINGE: 40 MG SUBQ Q12H 5 Days #10      * Warfarin Sodium (Coumadin) 10 MG TABLET: 10 MG PO QDAY@16 14 Days #14      * Nicotine 21 Mg Patch 1 EACH PATCH.TD24: 21 MG TRANSDERM QDAY PRN SMOKING (IF       PATIENT SMOKES) 14 Days #14      * Enoxaparin (Lovenox) 150 MG/1 ML SYRINGE: 150 MG SUBQ Q12H 5 Days #10      New Diagnostics      * FACTOR V LEIDEN MUTATION F5LEI, 1 DAY         Dx: Recurrent pulmonary embolism - I26.99      * Prothombin Gene Mutation, Routine         Dx: Recurrent pulmonary embolism - I26.99      * Cardiolipin Igg And Igm, Routine         Dx: Recurrent pulmonary embolism - I26.99            Plan      Recurrent pulmonary embolism      Patient will need to stay on anticoagulation for a minimum of 3 months      I have ordered labs to evaluate for inherited and acquired risk of thrombosis      Factor V Leiden prothrombin gene mutation and antiphospholipid antibodies has     been ordered      Protein C as well as protein S has not been as likely the results will be     affected by the patient's anticoagulated status      The patient's morbid obesity and his extreme sedentary lifestyle which includes     more than 12 hours/day of sitting while driving, we will need to consider     whether or not he should be anticoagulated indefinitely even if there is no     inherited or acquired risks found      I discussed the risks of death due to noncompliance with anticoagulation      Patient has been advised to follow-up in 2 months when the results of the     hypercoagulable work-up will be available and decision can be made regarding     continued anticoagulation at that time            Patient Education      Patient Education Provided:  Yes            Electronically signed by Lou Ontiveros  01/28/2021  12:05       Disclaimer: Converted document may not contain table formatting or lab diagrams. Please see [facility] for the authenticated document.

## 2021-07-19 DIAGNOSIS — I10 ESSENTIAL HYPERTENSION: ICD-10-CM

## 2021-07-19 DIAGNOSIS — R60.9 CHRONIC EDEMA: Primary | ICD-10-CM

## 2021-07-19 DIAGNOSIS — E11.65 TYPE 2 DIABETES MELLITUS WITH HYPERGLYCEMIA, UNSPECIFIED WHETHER LONG TERM INSULIN USE (HCC): ICD-10-CM

## 2021-07-19 DIAGNOSIS — Z86.718 HISTORY OF DVT (DEEP VEIN THROMBOSIS): ICD-10-CM

## 2021-07-19 RX ORDER — LISINOPRIL 40 MG/1
40 TABLET ORAL DAILY
COMMUNITY
Start: 2021-06-19 | End: 2021-07-19 | Stop reason: SDUPTHER

## 2021-07-19 RX ORDER — RIVAROXABAN 20 MG/1
20 TABLET, FILM COATED ORAL DAILY
COMMUNITY
Start: 2021-04-26 | End: 2021-07-19 | Stop reason: SDUPTHER

## 2021-07-19 RX ORDER — FUROSEMIDE 40 MG/1
40 TABLET ORAL DAILY
COMMUNITY
Start: 2021-06-19 | End: 2021-07-19 | Stop reason: SDUPTHER

## 2021-07-19 NOTE — TELEPHONE ENCOUNTER
Caller: Con Colón    Relationship: Self    Best call back number: [phone]    Medication needed: XARELTO,  METFORMIN, BLOOD PRESSURE MEDICATION, WATER PILL    HUB WAS UNABLE TO REVIEW PATIENT'S MED LIST AND PATIENT WAS GETTING HIS CAR WORKED ON WHEN HE CALLED IN.   Requested Prescriptions      No prescriptions requested or ordered in this encounter       When do you need the refill by: ASAP     What additional details did the patient provide when requesting the medication: PATIENT CALLED IN STATING THAT THE PHARMACY NEEDS NEW PRESCRIPTIONS SENT OVER. HE DID NOT HAVE HIS BOTTLES WITH HIM.     Does the patient have less than a 3 day supply:  [x] Yes  [] No    What is the patient's preferred pharmacy:    [facility]  [address] [place] [address]  PHONE [phone]    PLEASE ADVISE PATIENT

## 2021-07-20 RX ORDER — LISINOPRIL 40 MG/1
40 TABLET ORAL DAILY
Qty: 30 TABLET | Refills: 1 | Status: SHIPPED | OUTPATIENT
Start: 2021-07-20 | End: 2021-07-26 | Stop reason: SDUPTHER

## 2021-07-20 RX ORDER — RIVAROXABAN 20 MG/1
20 TABLET, FILM COATED ORAL DAILY
Qty: 30 TABLET | Refills: 1 | Status: SHIPPED | OUTPATIENT
Start: 2021-07-20 | End: 2021-07-26 | Stop reason: SDUPTHER

## 2021-07-20 RX ORDER — FUROSEMIDE 40 MG/1
40 TABLET ORAL DAILY
Qty: 30 TABLET | Refills: 1 | Status: SHIPPED | OUTPATIENT
Start: 2021-07-20 | End: 2021-07-26 | Stop reason: SDUPTHER

## 2021-07-20 NOTE — TELEPHONE ENCOUNTER
System states that meds unable to be prescribed because there is no pharmacy.  Do we know patient's pharmacy?

## 2021-07-20 NOTE — TELEPHONE ENCOUNTER
Caller: Con Colón    Relationship to patient: Self    Best call back number: [phone]    Patient is needing: PATIENT WANTED TO RETURN THE CALL AND LET PRACTICE KNOW HIS PREFERRED PHARMACY IS [facility], [place] [id number] [phone]

## 2021-07-21 DIAGNOSIS — R60.9 CHRONIC EDEMA: ICD-10-CM

## 2021-07-21 DIAGNOSIS — Z86.718 HISTORY OF DVT (DEEP VEIN THROMBOSIS): ICD-10-CM

## 2021-07-26 ENCOUNTER — LAB (OUTPATIENT)
Dept: LAB | Facility: HOSPITAL | Age: 46
End: 2021-07-26

## 2021-07-26 ENCOUNTER — OFFICE VISIT (OUTPATIENT)
Dept: FAMILY MEDICINE CLINIC | Facility: CLINIC | Age: 46
End: 2021-07-26

## 2021-07-26 VITALS
WEIGHT: 315 LBS | BODY MASS INDEX: 42.66 KG/M2 | HEIGHT: 72 IN | SYSTOLIC BLOOD PRESSURE: 159 MMHG | OXYGEN SATURATION: 95 % | DIASTOLIC BLOOD PRESSURE: 89 MMHG | RESPIRATION RATE: 22 BRPM | HEART RATE: 83 BPM

## 2021-07-26 DIAGNOSIS — R60.9 CHRONIC EDEMA: ICD-10-CM

## 2021-07-26 DIAGNOSIS — Z79.01 ANTICOAGULANT LONG-TERM USE: ICD-10-CM

## 2021-07-26 DIAGNOSIS — E11.65 TYPE 2 DIABETES MELLITUS WITH HYPERGLYCEMIA, UNSPECIFIED WHETHER LONG TERM INSULIN USE (HCC): ICD-10-CM

## 2021-07-26 DIAGNOSIS — E78.5 HYPERLIPIDEMIA, UNSPECIFIED HYPERLIPIDEMIA TYPE: ICD-10-CM

## 2021-07-26 DIAGNOSIS — R60.9 PITTING EDEMA: ICD-10-CM

## 2021-07-26 DIAGNOSIS — I87.8 CHRONIC VENOUS STASIS: ICD-10-CM

## 2021-07-26 DIAGNOSIS — I87.2 VENOUS INSUFFICIENCY: ICD-10-CM

## 2021-07-26 DIAGNOSIS — Z86.711 HISTORY OF PULMONARY EMBOLUS (PE): ICD-10-CM

## 2021-07-26 DIAGNOSIS — E66.01 MORBID OBESITY WITH BMI OF 50.0-59.9, ADULT (HCC): ICD-10-CM

## 2021-07-26 DIAGNOSIS — E11.65 TYPE 2 DIABETES MELLITUS WITH HYPERGLYCEMIA, UNSPECIFIED WHETHER LONG TERM INSULIN USE (HCC): Primary | ICD-10-CM

## 2021-07-26 DIAGNOSIS — Z86.718 HISTORY OF DVT (DEEP VEIN THROMBOSIS): ICD-10-CM

## 2021-07-26 DIAGNOSIS — I10 ESSENTIAL HYPERTENSION: ICD-10-CM

## 2021-07-26 LAB
ALBUMIN SERPL-MCNC: 3.5 G/DL (ref 3.5–5.2)
ALBUMIN/GLOB SERPL: 1 G/DL
ALP SERPL-CCNC: 85 U/L (ref 39–117)
ALT SERPL W P-5'-P-CCNC: 19 U/L (ref 1–41)
ANION GAP SERPL CALCULATED.3IONS-SCNC: 10.9 MMOL/L (ref 5–15)
AST SERPL-CCNC: 21 U/L (ref 1–40)
BILIRUB SERPL-MCNC: 0.5 MG/DL (ref 0–1.2)
BUN SERPL-MCNC: 15 MG/DL (ref 6–20)
BUN/CREAT SERPL: 18.3 (ref 7–25)
CALCIUM SPEC-SCNC: 8.6 MG/DL (ref 8.6–10.5)
CHLORIDE SERPL-SCNC: 107 MMOL/L (ref 98–107)
CHOLEST SERPL-MCNC: 104 MG/DL (ref 0–200)
CO2 SERPL-SCNC: 23.1 MMOL/L (ref 22–29)
CREAT SERPL-MCNC: 0.82 MG/DL (ref 0.76–1.27)
GFR SERPL CREATININE-BSD FRML MDRD: 102 ML/MIN/1.73
GLOBULIN UR ELPH-MCNC: 3.6 GM/DL
GLUCOSE SERPL-MCNC: 106 MG/DL (ref 65–99)
HBA1C MFR BLD: 6.4 % (ref 4.8–5.6)
HDLC SERPL-MCNC: 34 MG/DL (ref 40–60)
LDLC SERPL CALC-MCNC: 57 MG/DL (ref 0–100)
LDLC/HDLC SERPL: 1.71 {RATIO}
POTASSIUM SERPL-SCNC: 4.4 MMOL/L (ref 3.5–5.2)
PROT SERPL-MCNC: 7.1 G/DL (ref 6–8.5)
SODIUM SERPL-SCNC: 141 MMOL/L (ref 136–145)
TRIGL SERPL-MCNC: 59 MG/DL (ref 0–150)
VLDLC SERPL-MCNC: 13 MG/DL (ref 5–40)

## 2021-07-26 PROCEDURE — 80053 COMPREHEN METABOLIC PANEL: CPT

## 2021-07-26 PROCEDURE — 80061 LIPID PANEL: CPT

## 2021-07-26 PROCEDURE — 99214 OFFICE O/P EST MOD 30 MIN: CPT | Performed by: NURSE PRACTITIONER

## 2021-07-26 PROCEDURE — 83036 HEMOGLOBIN GLYCOSYLATED A1C: CPT

## 2021-07-26 RX ORDER — RIVAROXABAN 20 MG/1
TABLET, FILM COATED ORAL
Qty: 90 TABLET | OUTPATIENT
Start: 2021-07-26

## 2021-07-26 RX ORDER — FUROSEMIDE 40 MG/1
TABLET ORAL
Qty: 30 TABLET | Refills: 1 | OUTPATIENT
Start: 2021-07-26

## 2021-07-26 RX ORDER — FUROSEMIDE 40 MG/1
40 TABLET ORAL DAILY
Qty: 90 TABLET | Refills: 1 | Status: SHIPPED | OUTPATIENT
Start: 2021-07-26 | End: 2021-10-26 | Stop reason: SDUPTHER

## 2021-07-26 RX ORDER — RIVAROXABAN 20 MG/1
20 TABLET, FILM COATED ORAL DAILY
Qty: 90 TABLET | Refills: 1 | Status: SHIPPED | OUTPATIENT
Start: 2021-07-26 | End: 2021-10-26 | Stop reason: SDUPTHER

## 2021-07-26 RX ORDER — LISINOPRIL 40 MG/1
40 TABLET ORAL DAILY
Qty: 90 TABLET | Refills: 1 | Status: SHIPPED | OUTPATIENT
Start: 2021-07-26 | End: 2021-10-26 | Stop reason: SDUPTHER

## 2021-07-26 NOTE — ASSESSMENT & PLAN NOTE
Patient's (Body mass index is 55.5 kg/m².) indicates that they are morbidly obese (BMI > 40 or > 35 with obesity - related health condition) with obesity-related health conditions that include hypertension, diabetes mellitus and lower extremity venous stasis disease . Obesity is improving with lifestyle modifications. BMI is is above average; BMI management plan is completed. We discussed low calorie, low carb based diet program, portion control and increasing exercise     Patient has lost approximately 50 pounds since January.  Congratulated patient on weight loss and encouraged to continue..

## 2021-07-26 NOTE — PROGRESS NOTES
"Chief Complaint  Diabetes and Hypertension    Subjective          Con Colón presents to [facility] for   History of Present Illness    Patient is here for a follow up on HTN, DM2, HLD.     Patient has concerns with circulation issues in his legs.  States his legs always have a slightly discolored reddish-purple look.  States he has been treated for cellulitis previously.  Past Medical History:   Diagnosis Date   • Cardiomegaly    • COPD (chronic obstructive pulmonary disease) (CMS/HCC)    • Hypertension    • Pulmonary embolism (CMS/HCC)     PAST   • Sleep apnea      Surgical History  History reviewed. No pertinent surgical history.  Social History  Social History     Socioeconomic History   • Marital status: Other     Spouse name: Not on file   • Number of children: Not on file   • Years of education: Not on file   • Highest education level: Not on file   Tobacco Use   • Smoking status: Current Every Day Smoker   • Smokeless tobacco: Never Used   Vaping Use   • Vaping Use: Never used   Substance and Sexual Activity   • Alcohol use: Never   • Drug use: Never   • Sexual activity: Defer       Current Outpatient Medications:   •  furosemide (LASIX) 40 MG tablet, Take 1 tablet by mouth Daily., Disp: 30 tablet, Rfl: 1  •  lisinopril (PRINIVIL,ZESTRIL) 40 MG tablet, Take 1 tablet by mouth Daily., Disp: 30 tablet, Rfl: 1  •  metFORMIN (GLUCOPHAGE) 1000 MG tablet, Take 1 tablet by mouth 2 (Two) Times a Day With Meals., Disp: 60 tablet, Rfl: 1  •  Xarelto 20 MG tablet, Take 1 tablet by mouth Daily., Disp: 30 tablet, Rfl: 1    Review of Systems     Objective     /89 (BP Location: Left arm, Patient Position: Sitting, Cuff Size: Adult)   Pulse 83   Resp 22   Ht 182.9 cm (72\")   Wt (!) 186 kg (409 lb 3.2 oz)   SpO2 95%   BMI 55.50 kg/m²     Body mass index is 55.5 kg/m².    BP re-check 136/84      Physical Exam  Vitals reviewed.   Constitutional:       Appearance: Normal " appearance. He is well-developed.   HENT:      Head: Normocephalic and atraumatic.      Right Ear: External ear normal.      Left Ear: External ear normal.   Eyes:      Conjunctiva/sclera: Conjunctivae normal.      Pupils: Pupils are equal, round, and reactive to light.   Cardiovascular:      Rate and Rhythm: Normal rate and regular rhythm.      Heart sounds: No murmur heard.   No friction rub. No gallop.       Comments: Bilateral lower extremities with 1-2+ pitting edema, chronic skin changes noted, skin is dry and mildly thickened reddish-purple discoloration noted.  Pulmonary:      Effort: Pulmonary effort is normal.      Breath sounds: Normal breath sounds. No wheezing or rhonchi.   Skin:     General: Skin is warm and dry.   Neurological:      Mental Status: He is alert and oriented to person, place, and time.      Cranial Nerves: No cranial nerve deficit.   Psychiatric:         Mood and Affect: Mood and affect normal.         Behavior: Behavior normal.         Thought Content: Thought content normal.         Judgment: Judgment normal.         Result Review :     The following data was reviewed by: JACEK Aguayo on 07/26/2021:    CMP    CMP 11/16/20 1/13/21 4/26/21   Glucose 204 (A) 211 (A) 113 (A)   BUN 11 16 17   Creatinine 0.96 0.96 0.95   Sodium 139 138 139   Potassium 4.2 4.3 4.1   Chloride 104 101 101   Calcium 8.8 9.4 8.9   Albumin  3.7    Total Bilirubin  0.63    Alkaline Phosphatase  103    AST (SGOT)  18    ALT (SGPT)  21    (A) Abnormal value       Comments are available for some flowsheets but are not being displayed.           CBC    CBC 11/15/20 11/16/20 1/13/21   WBC 10.49 9.33 9.87   RBC 5.40 5.28 5.94   Hemoglobin 15.0 14.8 16.2   Hematocrit 46.9 46.2 52.2 (A)   MCV 86.9 87.5 87.9   MCH 27.8 28.0 27.3   MCHC 32.0 (A) 32.0 (A) 31.0 (A)   RDW 13.5 13.8 13.7   Platelets 197 196 220   (A) Abnormal value                                   Assessment:  Diagnoses and all orders for this  visit:    1. Type 2 diabetes mellitus with hyperglycemia, unspecified whether long term insulin use (CMS/[facility]) (Primary)  Assessment & Plan:  Diabetes is well controlled at present, last A1c 7.0% patient is to continue Metformin we will recheck A1c today    Orders:  -     Comprehensive Metabolic Panel; Future  -     Lipid Panel; Future  -     Hemoglobin A1c; Future  -     Microalbumin / Creatinine Urine Ratio - Urine, Clean Catch; Future    2. Essential hypertension  Assessment & Plan:  Hypertension fairly well controlled at present, patient is to continue to work on weight loss to help improve control.  Continue lisinopril and Lasix      3. Hyperlipidemia, unspecified hyperlipidemia type    4. Pitting edema    5. Venous insufficiency    6. Anticoagulant long-term use  Assessment & Plan:  Patient currently taking Xarelto every day, states he is doing well.  Denies any new onset of shortness of breath or worsening swelling in extremities      7. History of DVT (deep vein thrombosis)    8. History of pulmonary embolus (PE)    9. Morbid obesity with BMI of 50.0-59.9, adult (CMS/[facility])  Assessment & Plan:  Patient's (Body mass index is 55.5 kg/m².) indicates that they are morbidly obese (BMI > 40 or > 35 with obesity - related health condition) with obesity-related health conditions that include hypertension, diabetes mellitus and lower extremity venous stasis disease . Obesity is improving with lifestyle modifications. BMI is is above average; BMI management plan is completed. We discussed low calorie, low carb based diet program, portion control and increasing exercise     Patient has lost approximately 50 pounds since January.  Congratulated patient on weight loss and encouraged to continue..       10. Chronic venous stasis      Plan:   Discussed with patient the skin changes in his lower extremities is consistent with chronic venous stasis, discussed that his weight likely contributes significantly to this.  Patient is  actively working on weight loss, states he is unable to tolerate compression hoses, patient continues to take his Xarelto on a regular basis.        Follow Up     No follow-ups on file.    Patient was given instructions and counseling regarding his condition or for health maintenance advice. Please see specific information pulled into the AVS if appropriate.     Laurie William, APRN  07/26/2021

## 2021-07-26 NOTE — ASSESSMENT & PLAN NOTE
Hypertension fairly well controlled at present, patient is to continue to work on weight loss to help improve control.  Continue lisinopril and Lasix

## 2021-07-26 NOTE — ASSESSMENT & PLAN NOTE
Patient currently taking Xarelto every day, states he is doing well.  Denies any new onset of shortness of breath or worsening swelling in extremities

## 2021-07-26 NOTE — ASSESSMENT & PLAN NOTE
Diabetes is well controlled at present, last A1c 7.0% patient is to continue Metformin we will recheck A1c today

## 2021-07-27 ENCOUNTER — TELEPHONE (OUTPATIENT)
Dept: FAMILY MEDICINE CLINIC | Facility: CLINIC | Age: 46
End: 2021-07-27

## 2021-09-03 ENCOUNTER — OFFICE VISIT (OUTPATIENT)
Dept: CARDIOLOGY | Facility: CLINIC | Age: 46
End: 2021-09-03

## 2021-09-03 VITALS
SYSTOLIC BLOOD PRESSURE: 134 MMHG | DIASTOLIC BLOOD PRESSURE: 69 MMHG | WEIGHT: 315 LBS | HEIGHT: 69 IN | HEART RATE: 86 BPM | BODY MASS INDEX: 46.65 KG/M2

## 2021-09-03 DIAGNOSIS — Z79.01 CHRONIC ANTICOAGULATION: ICD-10-CM

## 2021-09-03 DIAGNOSIS — Z86.711 HISTORY OF PULMONARY EMBOLISM: ICD-10-CM

## 2021-09-03 DIAGNOSIS — I51.7 RIGHT VENTRICULAR ENLARGEMENT: Primary | ICD-10-CM

## 2021-09-03 DIAGNOSIS — Z99.89 OSA ON CPAP: ICD-10-CM

## 2021-09-03 DIAGNOSIS — E66.01 MORBID OBESITY WITH BMI OF 50.0-59.9, ADULT (HCC): ICD-10-CM

## 2021-09-03 DIAGNOSIS — I27.81 COR PULMONALE, CHRONIC (HCC): ICD-10-CM

## 2021-09-03 DIAGNOSIS — G47.33 OSA ON CPAP: ICD-10-CM

## 2021-09-03 PROCEDURE — 99214 OFFICE O/P EST MOD 30 MIN: CPT | Performed by: INTERNAL MEDICINE

## 2021-09-03 NOTE — PROGRESS NOTES
Chief Complaint  Hypertension and Shortness of Breath    Subjective            Con Colón presents to [facility]  History of Present Illness  Mr. Colón presents for routine follow-up today and states he is feeling well and remains at his chronic baseline.  He has lost a significant amount of weight since the spring (BMI was 66.3 in March and has improved to 56.9 today), but remains markedly overweight.  He has a history of recurrent DVTs and pulmonary emboli and remains on chronic anticoagulation with Xarelto 20 mg daily; no bleeding problems reported.  He was treated at [facility] in January 2021 for submassive pulmonary embolism with right ventricular enlargement on his echo at that time.  His first PE occurred clear back in 1995.  Mr. Colón also has obstructive sleep apnea and reports excellent compliance with CPAP therapy since his last visit.  He has not experienced any episodes of chest pain/pressure, palpitations, PND, syncope, or lightheadedness.  His chronic exertional dyspnea has improved mildly with weight loss, and he is attempting to lose a further substantial amount of weight with dietary restriction.  His chronic bilateral lower extremity edema has improved moderately since starting diuretic therapy with Lasix 6 months ago, but he continues to have some bilateral lower extremity swelling, particularly after standing on his feet for hours at work.  He does not wish to titrate his Lasix dosing schedule further today, as he states he can't afford to go to the bathroom more during the day.  His echocardiogram from January showed EF=60% with no hemodynamically significant valvular disease, but did reveal grade 1 diastolic dysfunction and right ventricular enlargement with evidence of pulmonary hypertension.  I again recommended a right heart catheterization for definitive evaluation and to guide medical therapy, but he wishes to defer this procedure for  now.      Past Medical History:   Diagnosis Date   • Cardiomegaly    • COPD (chronic obstructive pulmonary disease) (CMS/HCC)    • Hypertension    • Pulmonary embolism (CMS/HCC)     PAST   • Sleep apnea        No past surgical history on file.    Social History     Tobacco Use   • Smoking status: Former Smoker   • Smokeless tobacco: Never Used   • Tobacco comment: 2019 QUIT   Vaping Use   • Vaping Use: Former   • Passive vaping exposure Yes   Substance Use Topics   • Alcohol use: Never   • Drug use: Never       Family History   Problem Relation Age of Onset   • Diabetes Other         Current Outpatient Medications on File Prior to Visit   Medication Sig   • furosemide (LASIX) 40 MG tablet Take 1 tablet by mouth Daily.   • lisinopril (PRINIVIL,ZESTRIL) 40 MG tablet Take 1 tablet by mouth Daily.   • metFORMIN (GLUCOPHAGE) 1000 MG tablet Take 1 tablet by mouth 2 (Two) Times a Day With Meals.   • Xarelto 20 MG tablet Take 1 tablet by mouth Daily.     No current facility-administered medications on file prior to visit.       Allergies   Allergen Reactions   • Nuvigil [Armodafinil] Palpitations       Review of Systems   Constitutional: Positive for fatigue. Negative for chills, fever, unexpected weight gain and unexpected weight loss.   HENT: Negative for hearing loss, nosebleeds and sore throat.    Eyes: Negative for pain and visual disturbance.   Respiratory: Positive for shortness of breath. Negative for cough, chest tightness and wheezing.    Cardiovascular: Positive for leg swelling. Negative for chest pain and palpitations.   Gastrointestinal: Negative for abdominal pain, nausea and vomiting.   Endocrine: Negative for cold intolerance, heat intolerance and polydipsia.   Genitourinary: Negative for dysuria and hematuria.   Musculoskeletal: Positive for arthralgias and back pain. Negative for myalgias.   Skin: Negative for color change, pallor and rash.   Neurological: Negative for syncope, weakness, light-headedness  "and headache.   Hematological: Negative for adenopathy. Bruises/bleeds easily.        Objective     /69 (BP Location: Right arm)   Pulse 86   Ht 175.3 cm (69\")   Wt (!) 175 kg (385 lb)   BMI 56.85 kg/m²       Physical Exam  Constitutional:       General: He is not in acute distress.     Appearance: Normal appearance.   HENT:      Head: Atraumatic.      Mouth/Throat:      Mouth: Mucous membranes are moist.      Pharynx: Oropharynx is clear. No oropharyngeal exudate.   Eyes:      General: No scleral icterus.     Conjunctiva/sclera: Conjunctivae normal.   Neck:      Vascular: No carotid bruit or JVD.   Cardiovascular:      Rate and Rhythm: Normal rate and regular rhythm.  No extrasystoles are present.     Chest Wall: PMI is not displaced.      Pulses: Normal pulses.           Radial pulses are 2+ on the right side and 2+ on the left side.      Heart sounds: S1 normal and S2 normal. No murmur heard.   No friction rub. No gallop. No S3 sounds.    Pulmonary:      Effort: Pulmonary effort is normal. Prolonged expiration present. No tachypnea or respiratory distress.      Breath sounds: Examination of the right-lower field reveals decreased breath sounds. Decreased breath sounds present. No wheezing, rhonchi or rales.   Chest:      Chest wall: No tenderness.   Abdominal:      General: Bowel sounds are normal. There is no distension.      Palpations: Abdomen is soft. There is no mass.      Tenderness: There is no abdominal tenderness.      Comments: Morbidly obese.    Musculoskeletal:         General: No swelling, tenderness or deformity.      Cervical back: Neck supple. No tenderness.      Right lower le+ Edema present.      Left lower le+ Edema present.   Skin:     General: Skin is warm and dry.      Coloration: Skin is not jaundiced.      Findings: No erythema or rash.   Neurological:      General: No focal deficit present.      Mental Status: He is alert and oriented to person, place, and time.      " Motor: No weakness.   Psychiatric:         Mood and Affect: Mood normal.         Behavior: Behavior normal.         Result Review :     The following data was reviewed by: Margarito Yuan MD on 09/03/2021:    CMP    CMP 1/13/21 4/26/21 7/26/21   Glucose   106 (A)   Glucose 211 (A) 113 (A)    BUN 16 17 15   Creatinine 0.96 0.95 0.82   eGFR Non African Am   102   Sodium 138 139 141   Potassium 4.3 4.1 4.4   Chloride 101 101 107   Calcium 9.4 8.9 8.6   Albumin 3.7  3.50   Total Bilirubin 0.63  0.5   Alkaline Phosphatase 103  85   AST (SGOT) 18  21   ALT (SGPT) 21  19   (A) Abnormal value       Comments are available for some flowsheets but are not being displayed.           CBC    CBC 11/15/20 11/16/20 1/13/21   WBC 10.49 9.33 9.87   RBC 5.40 5.28 5.94   Hemoglobin 15.0 14.8 16.2   Hematocrit 46.9 46.2 52.2 (A)   MCV 86.9 87.5 87.9   MCH 27.8 28.0 27.3   MCHC 32.0 (A) 32.0 (A) 31.0 (A)   RDW 13.5 13.8 13.7   Platelets 197 196 220   (A) Abnormal value            Lipid Panel    Lipid Panel 7/26/21   Total Cholesterol 104   Triglycerides 59   HDL Cholesterol 34 (A)   VLDL Cholesterol 13   LDL Cholesterol  57   LDL/HDL Ratio 1.71   (A) Abnormal value               Assessment and Plan      Diagnoses and all orders for this visit:    1. Right ventricular enlargement (Primary)    2. Cor pulmonale, chronic (CMS/HCC)    3. History of pulmonary embolism    4. NELY on CPAP    5. Chronic anticoagulation    6. Morbid obesity with BMI of 50.0-59.9, adult (CMS/HCC)    -History of recurrent pulmonary emboli with right ventricular enlargement, pulmonary hypertension, and chronic cor pulmonale:  Continue diuretic therapy with Lasix 40 mg daily.  His chronic bilateral lower extremity edema has improved, but he continues to have some significant bilateral swelling at times.  He declines further dose titration of his diuretic, as he states he cannot do his job if he has to go to the bathroom more.  Continue K+ supplementation; his recent  labs showed normal K+ and normal renal function.  Continue chronic anticoagulation with Xarelto 20 mg daily; no bleeding issues reported.  He declines to proceed with definitive evaluation with a right heart catheterization at present.  I have previously recommended a Pulmonology referral, which he has declined.     -NELY:  Continued strict compliance with CPAP therapy was strongly recommended, both at night and with all naps.    -Morbid obesity:  BMI=56.9 today, improved significantly from 66.3 at his last visit in March 2021.  Further marked weight loss was strongly encouraged, and both dietary and exercise strategies were reviewed to assist with this goal.      Follow Up     Return in about 9 months (around 6/3/2022) for Next scheduled follow up.    Patient was given instructions and counseling regarding his condition or for health maintenance advice. Please see specific information pulled into the AVS if appropriate.     Con Colón  reports that he has quit smoking. He has never used smokeless tobacco.. I have educated him on the risk of diseases from using tobacco products such as cancer, COPD and heart disease.  Continued tobacco cessation was strongly advised.

## 2021-09-23 DIAGNOSIS — I10 ESSENTIAL HYPERTENSION: ICD-10-CM

## 2021-09-23 DIAGNOSIS — R60.9 CHRONIC EDEMA: ICD-10-CM

## 2021-09-23 DIAGNOSIS — E11.65 TYPE 2 DIABETES MELLITUS WITH HYPERGLYCEMIA, UNSPECIFIED WHETHER LONG TERM INSULIN USE (HCC): ICD-10-CM

## 2021-09-23 NOTE — TELEPHONE ENCOUNTER
Caller: Con Colón    Relationship: Self      Medication requested (name and dosage):    furosemide (LASIX) 40 MG tablet     lisinopril (PRINIVIL,ZESTRIL) 40 MG tablet    metFORMIN (GLUCOPHAGE) 1000 MG tablet    Pharmacy where request should be sent: [id number] - [place], [place] - [address] [facility] AT [facility] - [phone] [facility] [phone]   [phone]    Best call back number: [phone]     Does the patient have less than a 3 day supply:  [x] Yes  [] No          PATIENT CALLED STATING [employer] HAS SENT OVER EMAILS ABOUT ORDERS FOR THE PATIENTS CPAP MACHINE. HE IS NEEDING THAT TO BE SENT BACK SO HE CAN GET THE EQUIPMENT MAILED TO HIS HOUSE AS SOON AS POSSIBLE. PATIENT WOULD ALSO LIKE A CALL BACK TO LET HIM KNOW THAT THE MEDICATION HAS BEEN SENT TO THE PHARMACY PLEASE ADVISE THANK YOU.

## 2021-09-25 DIAGNOSIS — Z86.718 HISTORY OF DVT (DEEP VEIN THROMBOSIS): ICD-10-CM

## 2021-09-25 DIAGNOSIS — R60.9 CHRONIC EDEMA: ICD-10-CM

## 2021-09-26 DIAGNOSIS — E11.65 TYPE 2 DIABETES MELLITUS WITH HYPERGLYCEMIA, UNSPECIFIED WHETHER LONG TERM INSULIN USE (HCC): ICD-10-CM

## 2021-09-27 RX ORDER — LISINOPRIL 40 MG/1
40 TABLET ORAL DAILY
Qty: 90 TABLET | Refills: 1 | OUTPATIENT
Start: 2021-09-27

## 2021-09-27 RX ORDER — FUROSEMIDE 40 MG/1
40 TABLET ORAL DAILY
Qty: 90 TABLET | Refills: 1 | OUTPATIENT
Start: 2021-09-27

## 2021-09-29 RX ORDER — FUROSEMIDE 40 MG/1
40 TABLET ORAL DAILY
Qty: 90 TABLET | Refills: 1 | OUTPATIENT
Start: 2021-09-29

## 2021-09-29 RX ORDER — RIVAROXABAN 20 MG/1
20 TABLET, FILM COATED ORAL DAILY
Qty: 90 TABLET | Refills: 1 | OUTPATIENT
Start: 2021-09-29

## 2021-10-26 ENCOUNTER — OFFICE VISIT (OUTPATIENT)
Dept: FAMILY MEDICINE CLINIC | Facility: CLINIC | Age: 46
End: 2021-10-26

## 2021-10-26 VITALS
BODY MASS INDEX: 44.1 KG/M2 | OXYGEN SATURATION: 93 % | HEART RATE: 88 BPM | DIASTOLIC BLOOD PRESSURE: 91 MMHG | SYSTOLIC BLOOD PRESSURE: 130 MMHG | HEIGHT: 71 IN | WEIGHT: 315 LBS

## 2021-10-26 DIAGNOSIS — Z79.01 ANTICOAGULANT LONG-TERM USE: ICD-10-CM

## 2021-10-26 DIAGNOSIS — R60.9 CHRONIC EDEMA: ICD-10-CM

## 2021-10-26 DIAGNOSIS — Z86.718 HISTORY OF DVT (DEEP VEIN THROMBOSIS): ICD-10-CM

## 2021-10-26 DIAGNOSIS — E78.5 HYPERLIPIDEMIA, UNSPECIFIED HYPERLIPIDEMIA TYPE: ICD-10-CM

## 2021-10-26 DIAGNOSIS — E11.65 TYPE 2 DIABETES MELLITUS WITH HYPERGLYCEMIA, UNSPECIFIED WHETHER LONG TERM INSULIN USE (HCC): Primary | ICD-10-CM

## 2021-10-26 DIAGNOSIS — Z86.711 HISTORY OF PULMONARY EMBOLUS (PE): ICD-10-CM

## 2021-10-26 DIAGNOSIS — I10 ESSENTIAL HYPERTENSION: ICD-10-CM

## 2021-10-26 PROCEDURE — 99214 OFFICE O/P EST MOD 30 MIN: CPT | Performed by: NURSE PRACTITIONER

## 2021-10-26 RX ORDER — LISINOPRIL 40 MG/1
40 TABLET ORAL DAILY
Qty: 90 TABLET | Refills: 1 | Status: SHIPPED | OUTPATIENT
Start: 2021-10-26 | End: 2022-01-25 | Stop reason: SDUPTHER

## 2021-10-26 RX ORDER — RIVAROXABAN 20 MG/1
20 TABLET, FILM COATED ORAL DAILY
Qty: 90 TABLET | Refills: 1 | Status: SHIPPED | OUTPATIENT
Start: 2021-10-26 | End: 2022-01-25 | Stop reason: SDUPTHER

## 2021-10-26 RX ORDER — FUROSEMIDE 40 MG/1
40 TABLET ORAL DAILY
Qty: 90 TABLET | Refills: 1 | Status: SHIPPED | OUTPATIENT
Start: 2021-10-26 | End: 2022-01-25 | Stop reason: SDUPTHER

## 2021-10-26 NOTE — PROGRESS NOTES
"Chief Complaint  Hypertension and Diabetes    Subjective          Con Colón presents to [facility] for   History of Present Illness    Patient is here for a follow up on HTN, DM2.     Patient denies any concerns to address during this visit.  States he is still working on weight loss.  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  States he has been compliant with all his medications including Xarelto.  Medical History  Past Medical History:   Diagnosis Date   • Cardiomegaly    • COPD (chronic obstructive pulmonary disease) (HCC)    • Hypertension    • Pulmonary embolism (HCC)     PAST   • Sleep apnea      Surgical History  History reviewed. No pertinent surgical history.  Social History  Social History     Socioeconomic History   • Marital status: Other   Tobacco Use   • Smoking status: Former Smoker   • Smokeless tobacco: Never Used   • Tobacco comment: 2019 QUIT   Vaping Use   • Vaping Use: Former   • Passive vaping exposure: Yes   Substance and Sexual Activity   • Alcohol use: Never   • Drug use: Never   • Sexual activity: Defer       Current Outpatient Medications:   •  furosemide (LASIX) 40 MG tablet, Take 1 tablet by mouth Daily., Disp: 90 tablet, Rfl: 1  •  lisinopril (PRINIVIL,ZESTRIL) 40 MG tablet, Take 1 tablet by mouth Daily., Disp: 90 tablet, Rfl: 1  •  metFORMIN (GLUCOPHAGE) 1000 MG tablet, Take 1 tablet by mouth 2 (Two) Times a Day With Meals., Disp: 180 tablet, Rfl: 1  •  Xarelto 20 MG tablet, Take 1 tablet by mouth Daily., Disp: 90 tablet, Rfl: 1    Review of Systems     Objective     /91 (BP Location: Left arm, Patient Position: Sitting, Cuff Size: Adult)   Pulse 88   Ht 180.3 cm (71\")   Wt (!) 186 kg (409 lb 8 oz)   SpO2 93%   BMI 57.11 kg/m²     Body mass index is 57.11 kg/m².     132/84    Physical Exam  Vitals reviewed.   Constitutional:       Appearance: Normal appearance. He is well-developed.   HENT:      Head: " Normocephalic and atraumatic.      Right Ear: External ear normal.      Left Ear: External ear normal.   Eyes:      Conjunctiva/sclera: Conjunctivae normal.      Pupils: Pupils are equal, round, and reactive to light.   Cardiovascular:      Rate and Rhythm: Normal rate and regular rhythm.      Heart sounds: No murmur heard.  No friction rub. No gallop.       Comments: 1-2+ pitting edema bilateral lower extremity consistent with patient's usual presentation  Pulmonary:      Effort: Pulmonary effort is normal.      Breath sounds: Normal breath sounds. No wheezing or rhonchi.   Skin:     General: Skin is warm and dry.   Neurological:      Mental Status: He is alert and oriented to person, place, and time.      Cranial Nerves: No cranial nerve deficit.   Psychiatric:         Mood and Affect: Mood and affect normal.         Behavior: Behavior normal.         Thought Content: Thought content normal.         Judgment: Judgment normal.         Result Review :     The following data was reviewed by: JACEK Aguayo on 10/26/2021:    CMP    CMP 1/13/21 4/26/21 7/26/21   Glucose   106 (A)   Glucose 211 (A) 113 (A)    BUN 16 17 15   Creatinine 0.96 0.95 0.82   eGFR Non African Am   102   Sodium 138 139 141   Potassium 4.3 4.1 4.4   Chloride 101 101 107   Calcium 9.4 8.9 8.6   Albumin 3.7  3.50   Total Bilirubin 0.63  0.5   Alkaline Phosphatase 103  85   AST (SGOT) 18  21   ALT (SGPT) 21  19   (A) Abnormal value       Comments are available for some flowsheets but are not being displayed.           CBC    CBC 11/15/20 11/16/20 1/13/21   WBC 10.49 9.33 9.87   RBC 5.40 5.28 5.94   Hemoglobin 15.0 14.8 16.2   Hematocrit 46.9 46.2 52.2 (A)   MCV 86.9 87.5 87.9   MCH 27.8 28.0 27.3   MCHC 32.0 (A) 32.0 (A) 31.0 (A)   RDW 13.5 13.8 13.7   Platelets 197 196 220   (A) Abnormal value            Lipid Panel    Lipid Panel 7/26/21   Total Cholesterol 104   Triglycerides 59   HDL Cholesterol 34 (A)   VLDL Cholesterol 13   LDL  Cholesterol  57   LDL/HDL Ratio 1.71   (A) Abnormal value            Most Recent A1C    HGBA1C Most Recent 7/26/21   Hemoglobin A1C 6.40 (A)   (A) Abnormal value                               Assessment:  Diagnoses and all orders for this visit:    1. Type 2 diabetes mellitus with hyperglycemia, unspecified whether long term insulin use (HCC) (Primary)  Assessment & Plan:  Diabetes is very well controlled at present, last A1c 6.4%.  Continue current dose Metformin    Orders:  -     metFORMIN (GLUCOPHAGE) 1000 MG tablet; Take 1 tablet by mouth 2 (Two) Times a Day With Meals.  Dispense: 180 tablet; Refill: 1    2. History of DVT (deep vein thrombosis)  -     Xarelto 20 MG tablet; Take 1 tablet by mouth Daily.  Dispense: 90 tablet; Refill: 1    3. Essential hypertension  Assessment & Plan:  Hypertension is \fairly well controlled at present on manual recheck, continue current dose of lisinopril.  Continue to work on weight loss    Orders:  -     lisinopril (PRINIVIL,ZESTRIL) 40 MG tablet; Take 1 tablet by mouth Daily.  Dispense: 90 tablet; Refill: 1    4. Hyperlipidemia, unspecified hyperlipidemia type  Assessment & Plan:  Lipid abnormalities are very well controlled at present, discussed that HDL is still too low and would like to see an increasing cardiovascular exercise to try and help boost this number.      5. Chronic edema  -     furosemide (LASIX) 40 MG tablet; Take 1 tablet by mouth Daily.  Dispense: 90 tablet; Refill: 1    6. Anticoagulant long-term use  Assessment & Plan:  Patient continues to be compliant with his Xarelto.  Continue current dose      7. History of pulmonary embolus (PE)              Follow Up     Return in about 3 months (around 1/26/2022).    Patient was given instructions and counseling regarding his condition or for health maintenance advice. Please see specific information pulled into the AVS if appropriate.     Laurie William, APRN  10/26/2021

## 2021-10-27 PROBLEM — R60.9 CHRONIC EDEMA: Status: ACTIVE | Noted: 2021-10-27

## 2021-10-27 NOTE — ASSESSMENT & PLAN NOTE
Hypertension is \fairly well controlled at present on manual recheck, continue current dose of lisinopril.  Continue to work on weight loss

## 2021-10-27 NOTE — ASSESSMENT & PLAN NOTE
Lipid abnormalities are very well controlled at present, discussed that HDL is still too low and would like to see an increasing cardiovascular exercise to try and help boost this number.

## 2021-11-18 ENCOUNTER — TELEPHONE (OUTPATIENT)
Dept: FAMILY MEDICINE CLINIC | Facility: CLINIC | Age: 46
End: 2021-11-18

## 2021-11-18 NOTE — TELEPHONE ENCOUNTER
Received order request from [employer] for CPAP supplies. Laurie says this should be filled out by patients Pulmonologist/Sleep Specialist. I did not see provider in chart for this. I spoke with patient and he says he has not seen a pulmonologist since 2009.    What would you like to do?    Thank you!

## 2021-11-23 NOTE — TELEPHONE ENCOUNTER
Insurance may not cover supplies with it being that long, but I will sign for them and we will see. Please re-send fax for me to sign

## 2022-01-26 ENCOUNTER — OFFICE VISIT (OUTPATIENT)
Dept: FAMILY MEDICINE CLINIC | Facility: CLINIC | Age: 47
End: 2022-01-26

## 2022-01-26 ENCOUNTER — LAB (OUTPATIENT)
Dept: LAB | Facility: HOSPITAL | Age: 47
End: 2022-01-26

## 2022-01-26 VITALS
HEART RATE: 102 BPM | DIASTOLIC BLOOD PRESSURE: 79 MMHG | BODY MASS INDEX: 44.1 KG/M2 | HEIGHT: 71 IN | OXYGEN SATURATION: 94 % | SYSTOLIC BLOOD PRESSURE: 135 MMHG | WEIGHT: 315 LBS

## 2022-01-26 DIAGNOSIS — Z12.11 SCREENING FOR COLON CANCER: ICD-10-CM

## 2022-01-26 DIAGNOSIS — R60.9 CHRONIC EDEMA: ICD-10-CM

## 2022-01-26 DIAGNOSIS — Z13.29 SCREENING FOR THYROID DISORDER: ICD-10-CM

## 2022-01-26 DIAGNOSIS — Z11.59 NEED FOR HEPATITIS C SCREENING TEST: ICD-10-CM

## 2022-01-26 DIAGNOSIS — E66.01 MORBID OBESITY WITH BMI OF 50.0-59.9, ADULT: ICD-10-CM

## 2022-01-26 DIAGNOSIS — I10 ESSENTIAL HYPERTENSION: ICD-10-CM

## 2022-01-26 DIAGNOSIS — E11.65 TYPE 2 DIABETES MELLITUS WITH HYPERGLYCEMIA, UNSPECIFIED WHETHER LONG TERM INSULIN USE: Primary | ICD-10-CM

## 2022-01-26 DIAGNOSIS — Z86.718 HISTORY OF DVT (DEEP VEIN THROMBOSIS): ICD-10-CM

## 2022-01-26 DIAGNOSIS — Z13.220 SCREENING FOR LIPID DISORDERS: ICD-10-CM

## 2022-01-26 DIAGNOSIS — Z01.89 ROUTINE LAB DRAW: ICD-10-CM

## 2022-01-26 DIAGNOSIS — Z79.01 ANTICOAGULANT LONG-TERM USE: ICD-10-CM

## 2022-01-26 DIAGNOSIS — E11.65 TYPE 2 DIABETES MELLITUS WITH HYPERGLYCEMIA, UNSPECIFIED WHETHER LONG TERM INSULIN USE: ICD-10-CM

## 2022-01-26 LAB
ALBUMIN SERPL-MCNC: 4.1 G/DL (ref 3.5–5.2)
ALBUMIN/GLOB SERPL: 1.1 G/DL
ALP SERPL-CCNC: 87 U/L (ref 39–117)
ALT SERPL W P-5'-P-CCNC: 25 U/L (ref 1–41)
ANION GAP SERPL CALCULATED.3IONS-SCNC: 8.3 MMOL/L (ref 5–15)
AST SERPL-CCNC: 21 U/L (ref 1–40)
BASOPHILS # BLD AUTO: 0.04 10*3/MM3 (ref 0–0.2)
BASOPHILS NFR BLD AUTO: 0.5 % (ref 0–1.5)
BILIRUB SERPL-MCNC: 0.6 MG/DL (ref 0–1.2)
BUN SERPL-MCNC: 19 MG/DL (ref 6–20)
BUN/CREAT SERPL: 17.4 (ref 7–25)
CALCIUM SPEC-SCNC: 9.6 MG/DL (ref 8.6–10.5)
CHLORIDE SERPL-SCNC: 103 MMOL/L (ref 98–107)
CHOLEST SERPL-MCNC: 120 MG/DL (ref 0–200)
CO2 SERPL-SCNC: 28.7 MMOL/L (ref 22–29)
CREAT SERPL-MCNC: 1.09 MG/DL (ref 0.76–1.27)
DEPRECATED RDW RBC AUTO: 42.3 FL (ref 37–54)
EOSINOPHIL # BLD AUTO: 0.24 10*3/MM3 (ref 0–0.4)
EOSINOPHIL NFR BLD AUTO: 2.9 % (ref 0.3–6.2)
ERYTHROCYTE [DISTWIDTH] IN BLOOD BY AUTOMATED COUNT: 13.6 % (ref 12.3–15.4)
GFR SERPL CREATININE-BSD FRML MDRD: 73 ML/MIN/1.73
GLOBULIN UR ELPH-MCNC: 3.8 GM/DL
GLUCOSE SERPL-MCNC: 111 MG/DL (ref 65–99)
HBA1C MFR BLD: 7.19 % (ref 4.8–5.6)
HCT VFR BLD AUTO: 49.4 % (ref 37.5–51)
HCV AB SER DONR QL: NORMAL
HDLC SERPL-MCNC: 32 MG/DL (ref 40–60)
HGB BLD-MCNC: 16.3 G/DL (ref 13–17.7)
IMM GRANULOCYTES # BLD AUTO: 0.03 10*3/MM3 (ref 0–0.05)
IMM GRANULOCYTES NFR BLD AUTO: 0.4 % (ref 0–0.5)
LDLC SERPL CALC-MCNC: 77 MG/DL (ref 0–100)
LDLC/HDLC SERPL: 2.44 {RATIO}
LYMPHOCYTES # BLD AUTO: 1.5 10*3/MM3 (ref 0.7–3.1)
LYMPHOCYTES NFR BLD AUTO: 18.4 % (ref 19.6–45.3)
MCH RBC QN AUTO: 28.5 PG (ref 26.6–33)
MCHC RBC AUTO-ENTMCNC: 33 G/DL (ref 31.5–35.7)
MCV RBC AUTO: 86.4 FL (ref 79–97)
MONOCYTES # BLD AUTO: 0.48 10*3/MM3 (ref 0.1–0.9)
MONOCYTES NFR BLD AUTO: 5.9 % (ref 5–12)
NEUTROPHILS NFR BLD AUTO: 5.86 10*3/MM3 (ref 1.7–7)
NEUTROPHILS NFR BLD AUTO: 71.9 % (ref 42.7–76)
NRBC BLD AUTO-RTO: 0 /100 WBC (ref 0–0.2)
PLATELET # BLD AUTO: 188 10*3/MM3 (ref 140–450)
PMV BLD AUTO: 10.3 FL (ref 6–12)
POTASSIUM SERPL-SCNC: 4.4 MMOL/L (ref 3.5–5.2)
PROT SERPL-MCNC: 7.9 G/DL (ref 6–8.5)
RBC # BLD AUTO: 5.72 10*6/MM3 (ref 4.14–5.8)
SODIUM SERPL-SCNC: 140 MMOL/L (ref 136–145)
TRIGL SERPL-MCNC: 49 MG/DL (ref 0–150)
TSH SERPL DL<=0.05 MIU/L-ACNC: 1.63 UIU/ML (ref 0.27–4.2)
VLDLC SERPL-MCNC: 11 MG/DL (ref 5–40)
WBC NRBC COR # BLD: 8.15 10*3/MM3 (ref 3.4–10.8)

## 2022-01-26 PROCEDURE — 85025 COMPLETE CBC W/AUTO DIFF WBC: CPT

## 2022-01-26 PROCEDURE — 80053 COMPREHEN METABOLIC PANEL: CPT

## 2022-01-26 PROCEDURE — 99214 OFFICE O/P EST MOD 30 MIN: CPT | Performed by: NURSE PRACTITIONER

## 2022-01-26 PROCEDURE — 86803 HEPATITIS C AB TEST: CPT

## 2022-01-26 PROCEDURE — 80061 LIPID PANEL: CPT

## 2022-01-26 PROCEDURE — 83036 HEMOGLOBIN GLYCOSYLATED A1C: CPT

## 2022-01-26 PROCEDURE — 84443 ASSAY THYROID STIM HORMONE: CPT

## 2022-01-26 PROCEDURE — 36415 COLL VENOUS BLD VENIPUNCTURE: CPT

## 2022-01-26 RX ORDER — FUROSEMIDE 40 MG/1
40 TABLET ORAL DAILY
Qty: 90 TABLET | Refills: 1 | Status: SHIPPED | OUTPATIENT
Start: 2022-01-26

## 2022-01-26 RX ORDER — RIVAROXABAN 20 MG/1
20 TABLET, FILM COATED ORAL DAILY
Qty: 90 TABLET | Refills: 1 | Status: SHIPPED | OUTPATIENT
Start: 2022-01-26 | End: 2022-11-28

## 2022-01-26 RX ORDER — LISINOPRIL 40 MG/1
40 TABLET ORAL DAILY
Qty: 90 TABLET | Refills: 1 | Status: SHIPPED | OUTPATIENT
Start: 2022-01-26 | End: 2022-11-28

## 2022-01-26 NOTE — PROGRESS NOTES
"Chief Complaint  \Follow-up hypertension and diabetes  Subjective          Con Colón presents to [facility] for   History of Present Illness   Patient presents today to follow-up on hypertension (lisinopril), Diabetes (metformin), Edema (lasix), and History of DVT (xarelto ).  Patient states he has been doing well and is being compliant with all his medications.  Pt has lost 30 lbs since Oct.  States that he is just been trying to avoid sugars and has been eating a lot more salad for lunch.  Medical History  Past Medical History:   Diagnosis Date   • Cardiomegaly    • COPD (chronic obstructive pulmonary disease) (HCC)    • Hypertension    • Pulmonary embolism (HCC)     PAST   • Sleep apnea      Surgical History  History reviewed. No pertinent surgical history.  Social History  Social History     Socioeconomic History   • Marital status: Other   Tobacco Use   • Smoking status: Former Smoker   • Smokeless tobacco: Never Used   • Tobacco comment: 2019 QUIT   Vaping Use   • Vaping Use: Former   • Passive vaping exposure: Yes   Substance and Sexual Activity   • Alcohol use: Never   • Drug use: Never   • Sexual activity: Defer       Current Outpatient Medications:   •  furosemide (LASIX) 40 MG tablet, Take 1 tablet by mouth Daily., Disp: 90 tablet, Rfl: 1  •  lisinopril (PRINIVIL,ZESTRIL) 40 MG tablet, Take 1 tablet by mouth Daily., Disp: 90 tablet, Rfl: 1  •  metFORMIN (GLUCOPHAGE) 1000 MG tablet, Take 1 tablet by mouth 2 (Two) Times a Day With Meals., Disp: 180 tablet, Rfl: 1  •  Xarelto 20 MG tablet, Take 1 tablet by mouth Daily., Disp: 90 tablet, Rfl: 1    Review of Systems     Objective     /79   Pulse 102   Ht 180.3 cm (71\")   Wt (!) 175 kg (386 lb)   SpO2 94%   BMI 53.84 kg/m²     Body mass index is 53.84 kg/m².    Physical Exam  Vitals reviewed.   Constitutional:       Appearance: Normal appearance. He is well-developed.   HENT:      Head: Normocephalic and " atraumatic.      Right Ear: External ear normal.      Left Ear: External ear normal.   Eyes:      Conjunctiva/sclera: Conjunctivae normal.      Pupils: Pupils are equal, round, and reactive to light.   Cardiovascular:      Rate and Rhythm: Normal rate and regular rhythm.      Heart sounds: No murmur heard.  No friction rub. No gallop.    Pulmonary:      Effort: Pulmonary effort is normal.      Breath sounds: Normal breath sounds. No wheezing or rhonchi.   Skin:     General: Skin is warm and dry.   Neurological:      Mental Status: He is alert and oriented to person, place, and time.      Cranial Nerves: No cranial nerve deficit.   Psychiatric:         Mood and Affect: Mood and affect normal.         Behavior: Behavior normal.         Thought Content: Thought content normal.         Judgment: Judgment normal.         Result Review :     The following data was reviewed by: JACEK Aguayo on 01/26/2022:    CMP    CMP 4/26/21 7/26/21   Glucose 113 (A) 106 (A)   BUN 17 15   Creatinine 0.95 0.82   eGFR Non African Am  102   Sodium 139 141   Potassium 4.1 4.4   Chloride 101 107   Calcium 8.9 8.6   Albumin  3.50   Total Bilirubin  0.5   Alkaline Phosphatase  85   AST (SGOT)  21   ALT (SGPT)  19   (A) Abnormal value                Lipid Panel    Lipid Panel 7/26/21   Total Cholesterol 104   Triglycerides 59   HDL Cholesterol 34 (A)   VLDL Cholesterol 13   LDL Cholesterol  57   LDL/HDL Ratio 1.71   (A) Abnormal value                Most Recent A1C    HGBA1C Most Recent 7/26/21   Hemoglobin A1C 6.40 (A)   (A) Abnormal value                               Assessment:  Diagnoses and all orders for this visit:    1. Type 2 diabetes mellitus with hyperglycemia, unspecified whether long term insulin use (HCC) (Primary)  Assessment & Plan:  Diabetes is very well controlled, continue current dose Metformin, continue diet and exercise.    Orders:  -     metFORMIN (GLUCOPHAGE) 1000 MG tablet; Take 1 tablet by mouth 2 (Two) Times  a Day With Meals.  Dispense: 180 tablet; Refill: 1  -     CBC w AUTO Differential; Future  -     Comprehensive metabolic panel; Future  -     Hemoglobin A1c; Future  -     Microalbumin / Creatinine Urine Ratio - Urine, Clean Catch; Future    2. Chronic edema  -     furosemide (LASIX) 40 MG tablet; Take 1 tablet by mouth Daily.  Dispense: 90 tablet; Refill: 1    3. Essential hypertension  Assessment & Plan:  Hypertension is stable on current regimen, continue current medications.    Orders:  -     lisinopril (PRINIVIL,ZESTRIL) 40 MG tablet; Take 1 tablet by mouth Daily.  Dispense: 90 tablet; Refill: 1  -     CBC w AUTO Differential; Future  -     Comprehensive metabolic panel; Future    4. History of DVT (deep vein thrombosis)  -     Xarelto 20 MG tablet; Take 1 tablet by mouth Daily.  Dispense: 90 tablet; Refill: 1    5. Routine lab draw  -     CBC w AUTO Differential; Future  -     Comprehensive metabolic panel; Future  -     Lipid panel; Future  -     TSH; Future  -     Hepatitis C antibody; Future  -     Hemoglobin A1c; Future  -     Microalbumin / Creatinine Urine Ratio - Urine, Clean Catch; Future    6. Screening for lipid disorders  -     Lipid panel; Future    7. Screening for thyroid disorder  -     TSH; Future    8. Need for hepatitis C screening test  -     Hepatitis C antibody; Future    9. Screening for colon cancer  -     Ambulatory Referral For Screening Colonoscopy    10. Anticoagulant long-term use  Assessment & Plan:  Patient being compliant with Xarelto, continue current dose.      11. Morbid obesity with BMI of 50.0-59.9, adult (HCC)  Assessment & Plan:  Patient continues to lose weight, he has lost approximately 30 pounds since October.  He will continue to work on diet and exercise and I congratulated patient on his hard work.                Follow Up     Return in about 6 months (around 7/26/2022).    Patient was given instructions and counseling regarding his condition or for health  maintenance advice. Please see specific information pulled into the AVS if appropriate.     Laurie William, JACEK  01/26/2022

## 2022-01-26 NOTE — ASSESSMENT & PLAN NOTE
Patient continues to lose weight, he has lost approximately 30 pounds since October.  He will continue to work on diet and exercise and I congratulated patient on his hard work.

## 2022-01-27 ENCOUNTER — TELEPHONE (OUTPATIENT)
Dept: FAMILY MEDICINE CLINIC | Facility: CLINIC | Age: 47
End: 2022-01-27

## 2022-01-27 NOTE — TELEPHONE ENCOUNTER
----- Message from JACEK Patterson sent at 1/27/2022  7:32 AM EST -----  Labs look great other than your A1c has crept up a little, it went from 6.4 to 7.1.  Goal is to be less than 7, continue to work on diet and exercise.

## 2022-03-30 DIAGNOSIS — E11.65 TYPE 2 DIABETES MELLITUS WITH HYPERGLYCEMIA, UNSPECIFIED WHETHER LONG TERM INSULIN USE: Primary | ICD-10-CM

## 2022-03-30 NOTE — TELEPHONE ENCOUNTER
Patient is requesting refill on [employer] [place] onetouch test strips.

## 2022-04-04 DIAGNOSIS — E11.65 TYPE 2 DIABETES MELLITUS WITH HYPERGLYCEMIA, UNSPECIFIED WHETHER LONG TERM INSULIN USE: ICD-10-CM

## 2022-06-03 ENCOUNTER — OFFICE VISIT (OUTPATIENT)
Dept: CARDIOLOGY | Facility: CLINIC | Age: 47
End: 2022-06-03

## 2022-06-03 VITALS
SYSTOLIC BLOOD PRESSURE: 148 MMHG | DIASTOLIC BLOOD PRESSURE: 78 MMHG | OXYGEN SATURATION: 89 % | HEART RATE: 81 BPM | WEIGHT: 315 LBS | BODY MASS INDEX: 56.76 KG/M2

## 2022-06-03 DIAGNOSIS — G47.33 OSA ON CPAP: ICD-10-CM

## 2022-06-03 DIAGNOSIS — I51.7 RIGHT VENTRICULAR ENLARGEMENT: Primary | ICD-10-CM

## 2022-06-03 DIAGNOSIS — E66.01 MORBID OBESITY WITH BMI OF 50.0-59.9, ADULT: ICD-10-CM

## 2022-06-03 DIAGNOSIS — Z79.01 CHRONIC ANTICOAGULATION: ICD-10-CM

## 2022-06-03 DIAGNOSIS — Z86.711 HISTORY OF PULMONARY EMBOLISM: ICD-10-CM

## 2022-06-03 DIAGNOSIS — I10 ESSENTIAL HYPERTENSION: ICD-10-CM

## 2022-06-03 DIAGNOSIS — I27.81 COR PULMONALE (CHRONIC): ICD-10-CM

## 2022-06-03 DIAGNOSIS — Z99.89 OSA ON CPAP: ICD-10-CM

## 2022-06-03 PROCEDURE — 99214 OFFICE O/P EST MOD 30 MIN: CPT | Performed by: INTERNAL MEDICINE

## 2022-06-03 RX ORDER — MULTIPLE VITAMINS W/ MINERALS TAB 9MG-400MCG
1 TAB ORAL DAILY
COMMUNITY

## 2022-06-03 RX ORDER — CARVEDILOL 12.5 MG/1
12.5 TABLET ORAL 2 TIMES DAILY
Qty: 180 TABLET | Refills: 3 | Status: SHIPPED | OUTPATIENT
Start: 2022-06-03 | End: 2023-03-24

## 2022-11-27 DIAGNOSIS — E11.65 TYPE 2 DIABETES MELLITUS WITH HYPERGLYCEMIA, UNSPECIFIED WHETHER LONG TERM INSULIN USE: ICD-10-CM

## 2022-11-27 DIAGNOSIS — I10 ESSENTIAL HYPERTENSION: ICD-10-CM

## 2022-11-27 DIAGNOSIS — Z86.718 HISTORY OF DVT (DEEP VEIN THROMBOSIS): ICD-10-CM

## 2022-11-28 RX ORDER — RIVAROXABAN 20 MG/1
20 TABLET, FILM COATED ORAL DAILY
Qty: 90 TABLET | Refills: 0 | Status: SHIPPED | OUTPATIENT
Start: 2022-11-28

## 2022-11-28 RX ORDER — LISINOPRIL 40 MG/1
40 TABLET ORAL DAILY
Qty: 90 TABLET | Refills: 0 | Status: SHIPPED | OUTPATIENT
Start: 2022-11-28

## 2023-03-24 ENCOUNTER — OFFICE VISIT (OUTPATIENT)
Dept: CARDIOLOGY | Facility: CLINIC | Age: 48
End: 2023-03-24
Payer: MEDICAID

## 2023-03-24 VITALS
HEIGHT: 72 IN | WEIGHT: 315 LBS | DIASTOLIC BLOOD PRESSURE: 76 MMHG | BODY MASS INDEX: 42.66 KG/M2 | HEART RATE: 69 BPM | SYSTOLIC BLOOD PRESSURE: 114 MMHG | OXYGEN SATURATION: 90 %

## 2023-03-24 DIAGNOSIS — R60.0 BILATERAL LEG EDEMA: Primary | ICD-10-CM

## 2023-03-24 DIAGNOSIS — I45.2 RBBB (RIGHT BUNDLE BRANCH BLOCK WITH LEFT ANTERIOR FASCICULAR BLOCK): ICD-10-CM

## 2023-03-24 DIAGNOSIS — R60.0 BILATERAL LEG EDEMA: ICD-10-CM

## 2023-03-24 DIAGNOSIS — R06.09 DYSPNEA ON EXERTION: Primary | ICD-10-CM

## 2023-03-24 PROCEDURE — 99214 OFFICE O/P EST MOD 30 MIN: CPT | Performed by: INTERNAL MEDICINE

## 2023-03-24 PROCEDURE — 3078F DIAST BP <80 MM HG: CPT | Performed by: INTERNAL MEDICINE

## 2023-03-24 PROCEDURE — 3074F SYST BP LT 130 MM HG: CPT | Performed by: INTERNAL MEDICINE

## 2023-03-24 RX ORDER — FUROSEMIDE 40 MG/1
40 TABLET ORAL 2 TIMES DAILY
Qty: 180 TABLET | Refills: 3 | Status: SHIPPED | OUTPATIENT
Start: 2023-03-24

## 2023-03-24 NOTE — PROGRESS NOTES
[facility] CARDIOLOGY  [address] [address]  [place] [address]  Phone: [phone]  Fax: [phone]    03/24/2023    Chief Complaint   Patient presents with   • Chest Pain     Patient has leg pain and swelling, chest pain , cough, shortness of breath , fatigue,  dizziness,         History:   Con Colón is a 48 y.o. male seen in consultation, referred by  ref. provider found  for .  Patient has been referred from [facility] with complaints of chest pain.  His EKG showed right bundle branch block with ST's segment nonspecific changes and T wave inversion in the inferior leads and subsequently he was asked to see us for urgent evaluation.  He states the chest pain is gone was retrosternal related to coughing sympatholytic.  No pressure.  He is very sleepy has known sleep apnea but does use a CPAP.  He works third shift.  Has a prior echo in 2019 with normal LV systolic function.  Does have a history of pulmonary embolus and is on Xarelto for it he does have ongoing lower extremity edema.  No orthopnea          Past Medical History:   Diagnosis Date   • Cardiomegaly    • COPD (chronic obstructive pulmonary disease) (HCC)    • Hypertension    • Pulmonary embolism (HCC)     PAST   • Sleep apnea        History reviewed. No pertinent surgical history.     ROS  As above otherwise negative    Past Social History:  Social History     Socioeconomic History   • Marital status: Other   Tobacco Use   • Smoking status: Former   • Smokeless tobacco: Current     Types: Chew   • Tobacco comments:     2019 QUIT   Vaping Use   • Vaping Use: Never used   • Passive vaping exposure: Yes   Substance and Sexual Activity   • Alcohol use: Never   • Drug use: Never   • Sexual activity: Defer       Past Family History:  Family History   Problem Relation Age of Onset   • Diabetes Other        Current Outpatient Medications   Medication Sig Dispense Refill   • carvedilol (COREG) 12.5 MG tablet Take 1  tablet by mouth 2 (Two) Times a Day. 180 tablet 3   • furosemide (LASIX) 40 MG tablet Take 1 tablet by mouth Daily. 90 tablet 1   • glucose blood test strip Use 4 times daily as needed to check glucose 100 each 0   • lisinopril (PRINIVIL,ZESTRIL) 40 MG tablet TAKE 1 TABLET BY MOUTH DAILY 90 tablet 0   • metFORMIN (GLUCOPHAGE) 1000 MG tablet TAKE 1 TABLET BY MOUTH TWICE DAILY WITH MEALS 180 tablet 0   • multivitamin with minerals tablet tablet Take 1 tablet by mouth Daily.     • Xarelto 20 MG tablet TAKE 1 TABLET BY MOUTH DAILY 90 tablet 0     No current facility-administered medications for this visit.        Allergies   Allergen Reactions   • Nuvigil [Armodafinil] Palpitations   • Provigil [Modafinil] Palpitations         Objective:  Vitals:    03/24/23 1015   BP: 114/76   Pulse: 69   SpO2: 90%       Physical Exam  Very drowsy.  No carotid bruit no JVD obese lower extremity edema +3.  S1-S2 are normal with a persistently split S2 no murmurs rhonchi or wheezing.  Regular rhythm.  Extraocular movements intact sclera is clear poor dentition      DATA:           Procedures     Lab Results   Component Value Date    CHOL 120 01/26/2022    CHOL 104 07/26/2021     Lab Results   Component Value Date    TRIG 49 01/26/2022    TRIG 59 07/26/2021     Lab Results   Component Value Date    HDL 32 (L) 01/26/2022    HDL 34 (L) 07/26/2021     Lab Results   Component Value Date    LDL 77 01/26/2022    LDL 57 07/26/2021       Lab Results   Component Value Date    TSH 1.630 01/26/2022             Invalid input(s): JO STAHL            Assessment and Plan:    Con Colón  reports that he has quit smoking. His smokeless tobacco use includes chew.   We will plan to obtain a 2D echocardiogram.  Obtain venous Dopplers as well of the lower extremity.  We will plan to increase his Lasix and I have asked him to stop his carvedilol  Thank you for allowing me to participate in the care of Con Colón. Feel free to contact me directly  with any further questions or concerns.            Chaim Dahl MD, FACC, [facility]  Interventional Cardiology

## 2024-07-13 ENCOUNTER — HOSPITAL ENCOUNTER (INPATIENT)
Facility: HOSPITAL | Age: 49
LOS: 1 days | Discharge: HOME | End: 2024-07-16
Attending: INTERNAL MEDICINE | Admitting: INTERNAL MEDICINE
Payer: MEDICAID

## 2024-07-13 DIAGNOSIS — I21.4 NSTEMI (NON-ST ELEVATED MYOCARDIAL INFARCTION): Primary | ICD-10-CM

## 2024-07-13 DIAGNOSIS — R07.89 OTHER CHEST PAIN: ICD-10-CM

## 2024-07-13 LAB
ALBUMIN SERPL-MCNC: 3.8 G/DL (ref 3.5–5.2)
ALBUMIN/GLOB SERPL: 1 G/DL
ALP SERPL-CCNC: 111 U/L (ref 39–117)
ALT SERPL W P-5'-P-CCNC: 17 U/L (ref 1–41)
ANION GAP SERPL CALCULATED.3IONS-SCNC: 13.2 MMOL/L (ref 5–15)
APTT PPP: 34 SECONDS (ref 26.5–34.5)
AST SERPL-CCNC: 19 U/L (ref 1–40)
BILIRUB SERPL-MCNC: 1.2 MG/DL (ref 0–1.2)
BUN SERPL-MCNC: 19 MG/DL (ref 6–20)
BUN/CREAT SERPL: 20.9 (ref 7–25)
CALCIUM SPEC-SCNC: 9.4 MG/DL (ref 8.6–10.5)
CHLORIDE SERPL-SCNC: 106 MMOL/L (ref 98–107)
CO2 SERPL-SCNC: 22.8 MMOL/L (ref 22–29)
CREAT SERPL-MCNC: 0.91 MG/DL (ref 0.76–1.27)
DEPRECATED RDW RBC AUTO: 50.7 FL (ref 37–54)
EGFRCR SERPLBLD CKD-EPI 2021: 104.6 ML/MIN/1.73
ERYTHROCYTE [DISTWIDTH] IN BLOOD BY AUTOMATED COUNT: 15 % (ref 12.3–15.4)
GEN 5 2HR TROPONIN T REFLEX: 27 NG/L
GLOBULIN UR ELPH-MCNC: 3.7 GM/DL
GLUCOSE BLDC GLUCOMTR-MCNC: 103 MG/DL (ref 70–130)
GLUCOSE SERPL-MCNC: 102 MG/DL (ref 65–99)
HCT VFR BLD AUTO: 47.2 % (ref 37.5–51)
HGB BLD-MCNC: 15.4 G/DL (ref 13–17.7)
INR PPP: 1.33 (ref 0.9–1.1)
MCH RBC QN AUTO: 30.1 PG (ref 26.6–33)
MCHC RBC AUTO-ENTMCNC: 32.6 G/DL (ref 31.5–35.7)
MCV RBC AUTO: 92.4 FL (ref 79–97)
PLATELET # BLD AUTO: 122 10*3/MM3 (ref 140–450)
PMV BLD AUTO: 10.6 FL (ref 6–12)
POTASSIUM SERPL-SCNC: 4.2 MMOL/L (ref 3.5–5.2)
PROT SERPL-MCNC: 7.5 G/DL (ref 6–8.5)
PROTHROMBIN TIME: 16.6 SECONDS (ref 12.1–14.7)
QT INTERVAL: 424 MS
QTC INTERVAL: 477 MS
RBC # BLD AUTO: 5.11 10*6/MM3 (ref 4.14–5.8)
SODIUM SERPL-SCNC: 142 MMOL/L (ref 136–145)
TROPONIN T DELTA: 1 NG/L
TROPONIN T SERPL HS-MCNC: 26 NG/L
UFH PPP CHRO-ACNC: 0.72 IU/ML (ref 0.3–0.7)
WBC NRBC COR # BLD AUTO: 6.68 10*3/MM3 (ref 3.4–10.8)

## 2024-07-13 PROCEDURE — 85730 THROMBOPLASTIN TIME PARTIAL: CPT | Performed by: INTERNAL MEDICINE

## 2024-07-13 PROCEDURE — 84484 ASSAY OF TROPONIN QUANT: CPT | Performed by: INTERNAL MEDICINE

## 2024-07-13 PROCEDURE — 82948 REAGENT STRIP/BLOOD GLUCOSE: CPT

## 2024-07-13 PROCEDURE — 25010000002 ENOXAPARIN PER 10 MG: Performed by: INTERNAL MEDICINE

## 2024-07-13 PROCEDURE — 85610 PROTHROMBIN TIME: CPT | Performed by: INTERNAL MEDICINE

## 2024-07-13 PROCEDURE — 80053 COMPREHEN METABOLIC PANEL: CPT | Performed by: INTERNAL MEDICINE

## 2024-07-13 PROCEDURE — 25010000002 HEPARIN (PORCINE) 25000-0.45 UT/250ML-% SOLUTION: Performed by: INTERNAL MEDICINE

## 2024-07-13 PROCEDURE — 85520 HEPARIN ASSAY: CPT | Performed by: INTERNAL MEDICINE

## 2024-07-13 PROCEDURE — 99223 1ST HOSP IP/OBS HIGH 75: CPT | Performed by: INTERNAL MEDICINE

## 2024-07-13 PROCEDURE — 85027 COMPLETE CBC AUTOMATED: CPT | Performed by: INTERNAL MEDICINE

## 2024-07-13 PROCEDURE — G0378 HOSPITAL OBSERVATION PER HR: HCPCS

## 2024-07-13 PROCEDURE — 93005 ELECTROCARDIOGRAM TRACING: CPT | Performed by: INTERNAL MEDICINE

## 2024-07-13 RX ORDER — HEPARIN SODIUM 5000 [USP'U]/ML
4000 INJECTION, SOLUTION INTRAVENOUS; SUBCUTANEOUS AS NEEDED
Status: DISCONTINUED | OUTPATIENT
Start: 2024-07-13 | End: 2024-07-16 | Stop reason: HOSPADM

## 2024-07-13 RX ORDER — POLYETHYLENE GLYCOL 3350 17 G/17G
17 POWDER, FOR SOLUTION ORAL DAILY PRN
Status: DISCONTINUED | OUTPATIENT
Start: 2024-07-13 | End: 2024-07-16 | Stop reason: HOSPADM

## 2024-07-13 RX ORDER — AMOXICILLIN 250 MG
2 CAPSULE ORAL 2 TIMES DAILY PRN
Status: DISCONTINUED | OUTPATIENT
Start: 2024-07-13 | End: 2024-07-16 | Stop reason: HOSPADM

## 2024-07-13 RX ORDER — SODIUM CHLORIDE 9 MG/ML
40 INJECTION, SOLUTION INTRAVENOUS AS NEEDED
Status: DISCONTINUED | OUTPATIENT
Start: 2024-07-13 | End: 2024-07-16 | Stop reason: HOSPADM

## 2024-07-13 RX ORDER — SODIUM CHLORIDE 0.9 % (FLUSH) 0.9 %
10 SYRINGE (ML) INJECTION AS NEEDED
Status: DISCONTINUED | OUTPATIENT
Start: 2024-07-13 | End: 2024-07-16 | Stop reason: HOSPADM

## 2024-07-13 RX ORDER — HEPARIN SODIUM 5000 [USP'U]/ML
2000 INJECTION, SOLUTION INTRAVENOUS; SUBCUTANEOUS AS NEEDED
Status: DISCONTINUED | OUTPATIENT
Start: 2024-07-13 | End: 2024-07-16 | Stop reason: HOSPADM

## 2024-07-13 RX ORDER — BISACODYL 5 MG/1
5 TABLET, DELAYED RELEASE ORAL DAILY PRN
Status: DISCONTINUED | OUTPATIENT
Start: 2024-07-13 | End: 2024-07-16 | Stop reason: HOSPADM

## 2024-07-13 RX ORDER — HEPARIN SODIUM 10000 [USP'U]/100ML
5.7 INJECTION, SOLUTION INTRAVENOUS
Status: DISCONTINUED | OUTPATIENT
Start: 2024-07-13 | End: 2024-07-14

## 2024-07-13 RX ORDER — SODIUM CHLORIDE 0.9 % (FLUSH) 0.9 %
10 SYRINGE (ML) INJECTION EVERY 12 HOURS SCHEDULED
Status: DISCONTINUED | OUTPATIENT
Start: 2024-07-13 | End: 2024-07-16 | Stop reason: HOSPADM

## 2024-07-13 RX ORDER — BISACODYL 10 MG
10 SUPPOSITORY, RECTAL RECTAL DAILY PRN
Status: DISCONTINUED | OUTPATIENT
Start: 2024-07-13 | End: 2024-07-16 | Stop reason: HOSPADM

## 2024-07-13 RX ORDER — ENOXAPARIN SODIUM 100 MG/ML
40 INJECTION SUBCUTANEOUS EVERY 12 HOURS
Status: DISCONTINUED | OUTPATIENT
Start: 2024-07-13 | End: 2024-07-13

## 2024-07-13 RX ADMIN — ENOXAPARIN SODIUM 40 MG: 40 INJECTION SUBCUTANEOUS at 17:46

## 2024-07-13 RX ADMIN — HEPARIN SODIUM 5.7 UNITS/KG/HR: 10000 INJECTION, SOLUTION INTRAVENOUS at 22:14

## 2024-07-13 NOTE — Clinical Note
Patient attempting to lay flat on procedure table. States he is unable to stay flat. Patient is having difficuty breathing while laying flat. Patient did arrived on 4L of O2 via N/C. Patient raised to sitting position with improvement in breathing.

## 2024-07-13 NOTE — H&P
[facility] HOSPITALIST HISTORY AND PHYSICAL    Patient Identification:  Name:  Con Colón  Age:  49 y.o.  Sex:  male  :  1975  MRN:  [id number]   Admit Date: 2024   Visit Number:  [id number]  Primary Care Physician:  Provider, No Known       Chief complaint: Chest pain    History of presenting illness: Mr. Colón is a 49 y.o. male who presented to [facility] as a transfer from [facility].  Patient states 1 day prior he began having sudden onset chest pain which she described as a pressure type sensation which he rated to a 7 to an 8 on a 1-10 pain scale.  He reported no associated shortness of breath with diaphoresis but denied any fevers, chills, rigors or any other symptoms.  For this reason, patient did present to [facility] for further treatment and evaluation.  Initial evaluation in the emergency department did consist of basic laboratory work as well as physical exam and vital signs.  According to verbal communication via telephone with the ER provider at [facility], patient did have essentially normal CBC and CMP but did have ST changes on EKG consistent with lateral ischemia with mildly elevated troponin.  For this reason, patient was transferred for further treatment and evaluation of suspected NSTEMI.  -------------------------------------------------------------------------------------------------------------------  Past Medical History:   Diagnosis Date    Cardiomegaly     COPD (chronic obstructive pulmonary disease)     Hypertension     Pulmonary embolism     PAST    Sleep apnea      History reviewed. No pertinent surgical history.  Family History   Problem Relation Age of Onset    Diabetes Other      Social History     Socioeconomic History    Marital status:    Tobacco Use    Smoking status: Former    Smokeless tobacco: Current     Types: Chew    Tobacco comments:     2019 QUIT   Vaping Use    Vaping  status: Never Used    Passive vaping exposure: Yes   Substance and Sexual Activity    Alcohol use: Never    Drug use: Never    Sexual activity: Defer     ---------------------------------------------------------------------------------------------------------------------   Allergies:  Nuvigil [armodafinil] and Provigil [modafinil]  ---------------------------------------------------------------------------------------------------------------------   Prior to Admission Medications       Prescriptions Last Dose Informant Patient Reported? Taking?    furosemide (LASIX) 40 MG tablet   No No    Take 1 tablet by mouth Daily.    furosemide (LASIX) 40 MG tablet   No No    Take 1 tablet by mouth 2 (Two) Times a Day.    glucose blood test strip   No No    Use 4 times daily as needed to check glucose    lisinopril (PRINIVIL,ZESTRIL) 40 MG tablet   No No    TAKE 1 TABLET BY MOUTH DAILY    metFORMIN (GLUCOPHAGE) 1000 MG tablet   No No    TAKE 1 TABLET BY MOUTH TWICE DAILY WITH MEALS    multivitamin with minerals tablet tablet   Yes No    Take 1 tablet by mouth Daily.    Xarelto 20 MG tablet   No No    TAKE 1 TABLET BY MOUTH DAILY          Hospital Scheduled Meds:  enoxaparin, 40 mg, Subcutaneous, Q12H  sodium chloride, 10 mL, Intravenous, Q12H      Pharmacy to Dose enoxaparin (LOVENOX),       ---------------------------------------------------------------------------------------------------------------------   Review of Systems   On review of systems the patient denies the following unless noted above:     Constitutional:  Fevers, chills, weight change, fatigue     Eyes: Vision changes, headache, double vision, loss of vision     ENT: Runny nose, nose bleeds, ringing in ears, pain with swallowing, sore throat     Cardiovascular: Chest pains, palpitations, PND, orthopnea     Respiratory: Cough, wheezing, SOA, hemoptysis     GI:  Abdominal pain, diarrhea, constipation, change in stool caliber,    Rectal bleeding, vomiting or  nausea     : Difficulty voiding, dysuria, hematuria     Musculoskeletal: Changes of any chronic joint pain, swelling     Skin: Rash, itching, easy bruisability     Neurological: Unilateral weakness, new onset numbness, speech difficulties     Psychiatric: Sadness, tearfulness, feelings of hopelessness, racing thoughts     Endocrine:  Heat or cold intolerance, mood swings, polydipsia, polyphagia,    recent hypoglycemia  --------------------------------------------------------------------------------------------------------------------  Vital Signs:  Temp:  [98.9 °F (37.2 °C)] 98.9 °F (37.2 °C)  Heart Rate:  [71-81] 81  Resp:  [20] 20  BP: (123-143)/(80-89) 143/89      07/13/24  1435   Weight: (!) 173 kg (380 lb 9.6 oz)     Body mass index is 53.08 kg/m².  ---------------------------------------------------------------------------------------------------------------------   Physical exam:  Constitutional: Well-nourished  male in no apparent distress.     HENT:  Head:  Normocephalic and atraumatic.  Mouth:  Moist mucous membranes.    Eyes:  Conjunctivae and EOM are normal.  Pupils are equal, round, and reactive to light.  No scleral icterus.    Neck:  Neck supple. No thyromegaly.  No JVD present.    Cardiovascular:  Regular rate and rhythm with no murmurs, rubs, clicks or gallops appreciated.  Pulmonary/Chest:  Clear to auscultation bilaterally with no crackles, wheezes or rhonchi appreciated.  Abdominal:  Soft. Nontender. Nondistended  Bowel sounds are normal in all four quadrants. No organomegally appreciated.   Musculoskeletal:  5/5 muscle strength bilateral upper and lower extermties with equal muscle tone and bulk. No edema, no tenderness, and no deformity.  No red or swollen joints anywhere.    Neurological:  Alert, Cranial nerves II-XII intact with no focal defecits.  No facial droop.  No slurred speech.   Skin:  Warm and dry to palpation with no rashes or lesions appreciated.  Peripheral vascular:   "2+ radial and pedal pulses in bilateral upper and lower extremities.  Psychiatric:  Alert and oriented x3, demonstrates appropriate judgement and insight.  ---------------------------------------------------------------------------------------------------------------------  EKG: Normal sinus rhythm with ventricular rate in the 70s with ST depression in leads V3 through V6.  No ST elevation appreciated  ---------------------------------------------------------------------------------------------------------------------   Results from last 7 days   Lab Units 07/13/24  1539   WBC 10*3/mm3 6.68   HEMOGLOBIN g/dL 15.4   HEMATOCRIT % 47.2   MCV fL 92.4   MCHC g/dL 32.6   PLATELETS 10*3/mm3 122*         Results from last 7 days   Lab Units 07/13/24  1539   SODIUM mmol/L 142   POTASSIUM mmol/L 4.2   CHLORIDE mmol/L 106   CO2 mmol/L 22.8   BUN mg/dL 19   CREATININE mg/dL 0.91   CALCIUM mg/dL 9.4   GLUCOSE mg/dL 102*   ALBUMIN g/dL 3.8   BILIRUBIN mg/dL 1.2   ALK PHOS U/L 111   AST (SGOT) U/L 19   ALT (SGPT) U/L 17   Estimated Creatinine Clearance: 161.8 mL/min (by C-G formula based on SCr of 0.91 mg/dL).  No results found for: \"AMMONIA\"  Results from last 7 days   Lab Units 07/13/24  1729 07/13/24  1539   HSTROP T ng/L 27* 26*         Lab Results   Component Value Date    HGBA1C 7.19 (H) 01/26/2022     Lab Results   Component Value Date    TSH 1.630 01/26/2022     No results found for: \"PREGTESTUR\", \"PREGSERUM\", \"HCG\", \"HCGQUANT\"  Pain Management Panel           No data to display              No results found for: \"BLOODCX\"  No results found for: \"URINECX\"  No results found for: \"WOUNDCX\"  No results found for: \"STOOLCX\"      ---------------------------------------------------------------------------------------------------------------------  Imaging Results (Last 7 Days)       ** No results found for the last 168 hours. **            I have personally reviewed the radiology images and read the final radiology " report.  ---------------------------------------------------------------------------------------------------------------------  Assessment and Plan:    Chest pain -patient with minimally elevated troponins with some typical features.  Will obtain transthoracic echo and consult general cardiology for recommendations regarding stress test versus possible heart catheterization.     2.  Essential hypertension -will obtain home antihypertensive medications and restart once available    3.  History of PE -patient reports taking Xarelto at home, will discontinue and start on heparin drip this evening in anticipation of possible need for heart cath in the near future    4.  Obesity -complicates all aspects of care    Uday Kaplan DO  07/13/24  18:20 EDT

## 2024-07-13 NOTE — PLAN OF CARE
Goal Outcome Evaluation:  Plan of Care Reviewed With: patient   Will continue with plan of care.      Problem: Adult Inpatient Plan of Care  Goal: Plan of Care Review  Outcome: Ongoing, Progressing

## 2024-07-14 ENCOUNTER — APPOINTMENT (OUTPATIENT)
Dept: CARDIOLOGY | Facility: HOSPITAL | Age: 49
End: 2024-07-14
Payer: MEDICAID

## 2024-07-14 LAB
BASOPHILS # BLD AUTO: 0.04 10*3/MM3 (ref 0–0.2)
BASOPHILS NFR BLD AUTO: 0.6 % (ref 0–1.5)
BH CV ECHO MEAS - ACS: 2.1 CM
BH CV ECHO MEAS - AO MAX PG: 1.52 MMHG
BH CV ECHO MEAS - AO MEAN PG: 1 MMHG
BH CV ECHO MEAS - AO ROOT DIAM: 3.7 CM
BH CV ECHO MEAS - AO V2 MAX: 61.7 CM/SEC
BH CV ECHO MEAS - AO V2 VTI: 10.2 CM
BH CV ECHO MEAS - EDV(CUBED): 185.2 ML
BH CV ECHO MEAS - EDV(MOD-SP4): 45.1 ML
BH CV ECHO MEAS - EF(MOD-BP): 69 %
BH CV ECHO MEAS - EF(MOD-SP4): 69.4 %
BH CV ECHO MEAS - ESV(CUBED): 46.7 ML
BH CV ECHO MEAS - ESV(MOD-SP4): 13.8 ML
BH CV ECHO MEAS - FS: 36.8 %
BH CV ECHO MEAS - IVS/LVPW: 1.17 CM
BH CV ECHO MEAS - IVSD: 1.4 CM
BH CV ECHO MEAS - LA DIMENSION: 4.3 CM
BH CV ECHO MEAS - LAT PEAK E' VEL: 7.6 CM/SEC
BH CV ECHO MEAS - LV DIASTOLIC VOL/BSA (35-75): 16.3 CM2
BH CV ECHO MEAS - LV MASS(C)D: 322.2 GRAMS
BH CV ECHO MEAS - LV SYSTOLIC VOL/BSA (12-30): 5 CM2
BH CV ECHO MEAS - LVIDD: 5.7 CM
BH CV ECHO MEAS - LVIDS: 3.6 CM
BH CV ECHO MEAS - LVOT AREA: 4.2 CM2
BH CV ECHO MEAS - LVOT DIAM: 2.3 CM
BH CV ECHO MEAS - LVPWD: 1.2 CM
BH CV ECHO MEAS - MED PEAK E' VEL: 4.5 CM/SEC
BH CV ECHO MEAS - MV A MAX VEL: 46.3 CM/SEC
BH CV ECHO MEAS - MV E MAX VEL: 62.6 CM/SEC
BH CV ECHO MEAS - MV E/A: 1.35
BH CV ECHO MEAS - PA ACC TIME: 0.1 SEC
BH CV ECHO MEAS - RAP SYSTOLE: 10 MMHG
BH CV ECHO MEAS - RVSP: 55.2 MMHG
BH CV ECHO MEAS - SV(MOD-SP4): 31.3 ML
BH CV ECHO MEAS - SVI(MOD-SP4): 11.3 ML/M2
BH CV ECHO MEAS - TR MAX PG: 45.2 MMHG
BH CV ECHO MEAS - TR MAX VEL: 336 CM/SEC
BH CV ECHO MEASUREMENTS AVERAGE E/E' RATIO: 10.35
D DIMER PPP FEU-MCNC: 0.36 MCGFEU/ML (ref 0–0.5)
DEPRECATED RDW RBC AUTO: 52.2 FL (ref 37–54)
EOSINOPHIL # BLD AUTO: 0.3 10*3/MM3 (ref 0–0.4)
EOSINOPHIL NFR BLD AUTO: 4.6 % (ref 0.3–6.2)
ERYTHROCYTE [DISTWIDTH] IN BLOOD BY AUTOMATED COUNT: 15.2 % (ref 12.3–15.4)
HCT VFR BLD AUTO: 48.3 % (ref 37.5–51)
HGB BLD-MCNC: 15.4 G/DL (ref 13–17.7)
IMM GRANULOCYTES # BLD AUTO: 0.02 10*3/MM3 (ref 0–0.05)
IMM GRANULOCYTES NFR BLD AUTO: 0.3 % (ref 0–0.5)
LYMPHOCYTES # BLD AUTO: 1.6 10*3/MM3 (ref 0.7–3.1)
LYMPHOCYTES NFR BLD AUTO: 24.4 % (ref 19.6–45.3)
MCH RBC QN AUTO: 29.6 PG (ref 26.6–33)
MCHC RBC AUTO-ENTMCNC: 31.9 G/DL (ref 31.5–35.7)
MCV RBC AUTO: 92.9 FL (ref 79–97)
MONOCYTES # BLD AUTO: 0.48 10*3/MM3 (ref 0.1–0.9)
MONOCYTES NFR BLD AUTO: 7.3 % (ref 5–12)
NEUTROPHILS NFR BLD AUTO: 4.11 10*3/MM3 (ref 1.7–7)
NEUTROPHILS NFR BLD AUTO: 62.8 % (ref 42.7–76)
NRBC BLD AUTO-RTO: 0 /100 WBC (ref 0–0.2)
PLATELET # BLD AUTO: 132 10*3/MM3 (ref 140–450)
PMV BLD AUTO: 10.8 FL (ref 6–12)
RBC # BLD AUTO: 5.2 10*6/MM3 (ref 4.14–5.8)
UFH PPP CHRO-ACNC: 0.11 IU/ML (ref 0.3–0.7)
UFH PPP CHRO-ACNC: 0.35 IU/ML (ref 0.3–0.7)
UFH PPP CHRO-ACNC: 0.67 IU/ML (ref 0.3–0.7)
WBC NRBC COR # BLD AUTO: 6.55 10*3/MM3 (ref 3.4–10.8)

## 2024-07-14 PROCEDURE — 85379 FIBRIN DEGRADATION QUANT: CPT | Performed by: PHYSICIAN ASSISTANT

## 2024-07-14 PROCEDURE — G0378 HOSPITAL OBSERVATION PER HR: HCPCS

## 2024-07-14 PROCEDURE — 85025 COMPLETE CBC W/AUTO DIFF WBC: CPT | Performed by: INTERNAL MEDICINE

## 2024-07-14 PROCEDURE — 93306 TTE W/DOPPLER COMPLETE: CPT

## 2024-07-14 PROCEDURE — 85520 HEPARIN ASSAY: CPT | Performed by: INTERNAL MEDICINE

## 2024-07-14 PROCEDURE — 25010000002 HEPARIN (PORCINE) 25000-0.45 UT/250ML-% SOLUTION: Performed by: INTERNAL MEDICINE

## 2024-07-14 PROCEDURE — 99232 SBSQ HOSP IP/OBS MODERATE 35: CPT | Performed by: INTERNAL MEDICINE

## 2024-07-14 PROCEDURE — 25010000002 HEPARIN (PORCINE) PER 1000 UNITS: Performed by: INTERNAL MEDICINE

## 2024-07-14 PROCEDURE — 93306 TTE W/DOPPLER COMPLETE: CPT | Performed by: INTERNAL MEDICINE

## 2024-07-14 RX ORDER — FUROSEMIDE 40 MG/1
40 TABLET ORAL DAILY
Status: ON HOLD | COMMUNITY

## 2024-07-14 RX ORDER — ALBUTEROL SULFATE 90 UG/1
2 AEROSOL, METERED RESPIRATORY (INHALATION) EVERY 6 HOURS PRN
Status: ON HOLD | COMMUNITY

## 2024-07-14 RX ORDER — BUDESONIDE AND FORMOTEROL FUMARATE DIHYDRATE 160; 4.5 UG/1; UG/1
2 AEROSOL RESPIRATORY (INHALATION) 2 TIMES DAILY PRN
Status: ON HOLD | COMMUNITY

## 2024-07-14 RX ORDER — HEPARIN SODIUM 10000 [USP'U]/100ML
9.7 INJECTION, SOLUTION INTRAVENOUS
Status: DISCONTINUED | OUTPATIENT
Start: 2024-07-14 | End: 2024-07-15

## 2024-07-14 RX ORDER — ATORVASTATIN CALCIUM 40 MG/1
40 TABLET, FILM COATED ORAL NIGHTLY
Status: DISCONTINUED | OUTPATIENT
Start: 2024-07-14 | End: 2024-07-16 | Stop reason: HOSPADM

## 2024-07-14 RX ORDER — ASPIRIN 81 MG/1
81 TABLET ORAL DAILY
Status: DISCONTINUED | OUTPATIENT
Start: 2024-07-14 | End: 2024-07-16 | Stop reason: HOSPADM

## 2024-07-14 RX ORDER — HEPARIN SODIUM 5000 [USP'U]/ML
4000 INJECTION, SOLUTION INTRAVENOUS; SUBCUTANEOUS ONCE
Status: COMPLETED | OUTPATIENT
Start: 2024-07-14 | End: 2024-07-14

## 2024-07-14 RX ORDER — CETIRIZINE HYDROCHLORIDE 10 MG/1
10 TABLET ORAL DAILY
Status: ON HOLD | COMMUNITY

## 2024-07-14 RX ADMIN — METOPROLOL TARTRATE 25 MG: 25 TABLET, FILM COATED ORAL at 20:24

## 2024-07-14 RX ADMIN — ATORVASTATIN CALCIUM 40 MG: 40 TABLET, FILM COATED ORAL at 20:25

## 2024-07-14 RX ADMIN — ASPIRIN 81 MG: 81 TABLET, COATED ORAL at 15:30

## 2024-07-14 RX ADMIN — HEPARIN SODIUM 3.7 UNITS/KG/HR: 10000 INJECTION, SOLUTION INTRAVENOUS at 05:33

## 2024-07-14 RX ADMIN — HEPARIN SODIUM 4000 UNITS: 5000 INJECTION INTRAVENOUS; SUBCUTANEOUS at 21:13

## 2024-07-14 NOTE — PLAN OF CARE
Goal Outcome Evaluation: Pt has not verbalized any complaints of CP this shift. Cardiologist consulted & assessed pt this shift; new orders noted. Pt to be NPO after midnight for cardiac cath in a.m. Heparin continues to infuse. Will continue to monitor & follow plan of care.

## 2024-07-14 NOTE — PLAN OF CARE
Goal Outcome Evaluation:            Heparin infusing. No complaints of chest pain this evening. Will continue to follow plan of care

## 2024-07-14 NOTE — PROGRESS NOTES
[facility]     Patient Identification:  Name:  Con Colón  Age:  49 y.o.  Sex:  male  :  1975  MRN:  [id number]  Visit Number:  [id number]  Primary Care Provider:  Provider, No Known    Length of stay:  1    Chief complaint: Chest pain    Subjective:    Patient seen and examined at bedside with no nursing staff present.  Patient was sitting on the edge of his bed eating breakfast.  Patient denies any chest pain this morning.  No new events overnight.  ----------------------------------------------------------------------------------------------------------------------  Current Hospital Meds:  sodium chloride, 10 mL, Intravenous, Q12H      heparin, 3.7 Units/kg/hr (Dosing Weight), Last Rate: 3.7 Units/kg/hr (24 0533)  Pharmacy to Dose Heparin,       ----------------------------------------------------------------------------------------------------------------------  Vital Signs:  Temp:  [97.6 °F (36.4 °C)-98.9 °F (37.2 °C)] 97.6 °F (36.4 °C)  Heart Rate:  [71-81] 77  Resp:  [20] 20  BP: (118-143)/(64-89) 118/69      24  1435 24  0500   Weight: (!) 173 kg (380 lb 9.6 oz) (!) 171 kg (377 lb 3.3 oz)     Body mass index is 52.61 kg/m².    Intake/Output Summary (Last 24 hours) at 2024 1044  Last data filed at 2024 0830  Gross per 24 hour   Intake 840 ml   Output --   Net 840 ml     NPO Diet NPO Type: Strict NPO  ----------------------------------------------------------------------------------------------------------------------  Physical exam:  Constitutional: Well-nourished  male in no apparent distress.     HENT:  Head:  Normocephalic and atraumatic.  Mouth:  Moist mucous membranes.    Eyes:  Conjunctivae and EOM are normal.  Pupils are equal, round, and reactive to light.  No scleral icterus.    Neck:  Neck supple. No thyromegaly.  No JVD present.    Cardiovascular:  Regular rate and rhythm with no murmurs, rubs, clicks or  "gallops appreciated.  Pulmonary/Chest:  Clear to auscultation bilaterally with no crackles, wheezes or rhonchi appreciated.  Abdominal:  Soft. Nontender. Nondistended  Bowel sounds are normal in all four quadrants. No organomegally appreciated.   Musculoskeletal:  5/5 muscle strength bilateral upper and lower extremities with equal muscle tone and bulk. No edema, no tenderness, and no deformity.  No red or swollen joints anywhere.    Neurological:  Alert, Cranial nerves II-XII intact with no focal deficits.  No facial droop.  No slurred speech.   Skin:  Warm and dry to palpation with no rashes or lesions appreciated.  Peripheral vascular:  2+ radial and pedal pulses in bilateral upper and lower extremities.  Psychiatric:  Alert and oriented x3, demonstrates appropriate judgment and insight.  ------------------------------------------------------------------   ----------------------------------------------------------------------------------------------------------------------  Results from last 7 days   Lab Units 07/13/24  1729 07/13/24  1539   HSTROP T ng/L 27* 26*     Results from last 7 days   Lab Units 07/14/24  0235 07/13/24  1845 07/13/24  1539   WBC 10*3/mm3 6.55  --  6.68   HEMOGLOBIN g/dL 15.4  --  15.4   HEMATOCRIT % 48.3  --  47.2   MCV fL 92.9  --  92.4   MCHC g/dL 31.9  --  32.6   PLATELETS 10*3/mm3 132*  --  122*   INR   --  1.33*  --          Results from last 7 days   Lab Units 07/13/24  1539   SODIUM mmol/L 142   POTASSIUM mmol/L 4.2   CHLORIDE mmol/L 106   CO2 mmol/L 22.8   BUN mg/dL 19   CREATININE mg/dL 0.91   CALCIUM mg/dL 9.4   GLUCOSE mg/dL 102*   ALBUMIN g/dL 3.8   BILIRUBIN mg/dL 1.2   ALK PHOS U/L 111   AST (SGOT) U/L 19   ALT (SGPT) U/L 17   Estimated Creatinine Clearance: 161.8 mL/min (by C-G formula based on SCr of 0.91 mg/dL).    No results found for: \"AMMONIA\"      No results found for: \"BLOODCX\"  No results found for: \"URINECX\"  No results found for: \"WOUNDCX\"  No results found for: " "\"STOOLCX\"    I have personally looked at the labs and they are summarized above.  ----------------------------------------------------------------------------------------------------------------------  Imaging Results (Last 24 Hours)       ** No results found for the last 24 hours. **          ----------------------------------------------------------------------------------------------------------------------  Assessment and Plan:    Chest pain -patient to have transthoracic echo performed today, cardiology consulted to give further input into stress test versus proceeding with left heart catheterization.  Patient is now NPO.    2.  Essential hypertension -well-controlled    3.  History of PE -patient on Xarelto at home for history of PE, have discontinued and transition to heparin drip in anticipation of possible need for left heart catheterization during this hospital stay    4.  Morbid obesity -complicates all aspects of care    Disposition awaiting further cardiology recommendations    Uday Kaplan DO   07/14/24   10:44 EDT     "

## 2024-07-14 NOTE — CONSULTS
Consults  Date of Admit: 7/13/2024  Date of Consult: 07/14/24  Provider, No Known  Con Colón  1975  Consulting Physician: Dr. Velasquez    Cardiology consultation    Reason for consultation: chest pain  Assessment:  NSTEMI type I versus 2  History of PE, chronically anticoagulated with Xarelto normal D-dimer  Essential hypertension      Recommendations:  Given Mr. Colón's persistent anginal-like symptoms with EKG changes and minimally elevated troponin we did recommend proceeding with left heart catheterization in the morning.  We discussed the risk of the procedure including MI, CVA, arterial damage, and renal injury.  He expressed understanding with this and agreed to proceed.  Start aspirin 81 mg, Lipitor 40 mg, and Lopressor 25 mg      History of Present Illness    Subjective     No chief complaint on file.      Con Colón is a 49 y.o. male with past medical history significant for history of PE chronically anticoagulated with Xarelto, diabetes mellitus, essential hypertension, dyslipidemia.  Con presents to [facility] emergency department yesterday after persistent chest pains that developed that he describes as heaviness in his chest like a weighted sitting on the center of his chest.  He denies any radiation of this pain but did report increasing shortness of breath.  He reports she has had a few episodes over the last several years however this recent episode was more severe.  He reports that the pain persisted until he arrived in the ER where he received nitroglycerin.  He has been chest pain-free since admission.          Past Medical History:   Diagnosis Date    Cardiomegaly     COPD (chronic obstructive pulmonary disease)     Hypertension     Pulmonary embolism     PAST    Sleep apnea      History reviewed. No pertinent surgical history.  Family History   Problem Relation Age of Onset    Diabetes Other      Social History     Tobacco Use    Smoking status: Former     Smokeless tobacco: Current     Types: Chew    Tobacco comments:     2019 QUIT   Vaping Use    Vaping status: Never Used    Passive vaping exposure: Yes   Substance Use Topics    Alcohol use: Never    Drug use: Never     Medications Prior to Admission   Medication Sig Dispense Refill Last Dose    furosemide (LASIX) 40 MG tablet Take 1 tablet by mouth Daily. 90 tablet 1     furosemide (LASIX) 40 MG tablet Take 1 tablet by mouth 2 (Two) Times a Day. 180 tablet 3     glucose blood test strip Use 4 times daily as needed to check glucose 100 each 0     lisinopril (PRINIVIL,ZESTRIL) 40 MG tablet TAKE 1 TABLET BY MOUTH DAILY 90 tablet 0     metFORMIN (GLUCOPHAGE) 1000 MG tablet TAKE 1 TABLET BY MOUTH TWICE DAILY WITH MEALS 180 tablet 0     multivitamin with minerals tablet tablet Take 1 tablet by mouth Daily.       Xarelto 20 MG tablet TAKE 1 TABLET BY MOUTH DAILY 90 tablet 0      Allergies:  Nuvigil [armodafinil] and Provigil [modafinil]      Current Facility-Administered Medications:     sennosides-docusate (PERICOLACE) 8.6-50 MG per tablet 2 tablet, 2 tablet, Oral, BID PRN **AND** polyethylene glycol (MIRALAX) packet 17 g, 17 g, Oral, Daily PRN **AND** bisacodyl (DULCOLAX) EC tablet 5 mg, 5 mg, Oral, Daily PRN **AND** bisacodyl (DULCOLAX) suppository 10 mg, 10 mg, Rectal, Daily PRN, Uday Kaplan DO    heparin (porcine) 5000 UNIT/ML injection 2,000 Units, 2,000 Units, Intravenous, PRN, Uday Kaplan DO    heparin (porcine) 5000 UNIT/ML injection 4,000 Units, 4,000 Units, Intravenous, PRN, Uday Kaplan DO    heparin 79432 units/250 mL (100 units/mL) in 0.45 % NaCl infusion, 3.7 Units/kg/hr (Dosing Weight), Intravenous, Titrated, Uday Kaplan DO, Last Rate: 6.4 mL/hr at 07/14/24 0533, 3.7 Units/kg/hr at 07/14/24 0533    Pharmacy to Dose Heparin, , Does not apply, Continuous PRN, Kaplan, Uday Mo, DO    sodium chloride 0.9 % flush 10 mL, 10 mL, Intravenous, Q12H, Eusebio,  Uday Hassan,     sodium chloride 0.9 % flush 10 mL, 10 mL, Intravenous, PRN, Uday Kaplan, DO    sodium chloride 0.9 % infusion 40 mL, 40 mL, Intravenous, PRN, Uday Kaplan, DO    Review of Systems   Constitutional:  Negative for chills and diaphoresis.   HENT:  Negative for congestion and nosebleeds.    Respiratory:  Positive for chest tightness and shortness of breath.    Cardiovascular:  Positive for chest pain. Negative for palpitations.   Gastrointestinal:  Negative for abdominal pain and constipation.   Genitourinary:  Negative for dysuria and hematuria.   Musculoskeletal:  Negative for arthralgias and back pain.   Skin:  Negative for color change and pallor.   Neurological:  Negative for dizziness and seizures.   Psychiatric/Behavioral:  Negative for agitation and confusion.          Objective      Vital Signs  Temp:  [97.6 °F (36.4 °C)-98.9 °F (37.2 °C)] 97.6 °F (36.4 °C)  Heart Rate:  [71-81] 77  Resp:  [20] 20  BP: (118-143)/(64-89) 118/69  Body mass index is 52.61 kg/m².    Intake/Output Summary (Last 24 hours) at 7/14/2024 0918  Last data filed at 7/13/2024 2000  Gross per 24 hour   Intake 600 ml   Output --   Net 600 ml       Physical Exam  Constitutional:       Appearance: Normal appearance.   HENT:      Head: Normocephalic and atraumatic.      Mouth/Throat:      Mouth: Mucous membranes are moist.   Eyes:      General:         Right eye: No discharge.         Left eye: No discharge.      Pupils: Pupils are equal, round, and reactive to light.   Cardiovascular:      Rate and Rhythm: Normal rate and regular rhythm.   Pulmonary:      Effort: Pulmonary effort is normal.      Breath sounds: Normal breath sounds.   Abdominal:      General: Abdomen is flat.      Palpations: Abdomen is soft.   Skin:     General: Skin is warm and dry.   Neurological:      General: No focal deficit present.      Mental Status: He is alert and oriented to person, place, and time.           Results  Review:   I reviewed the patient's new clinical results.  Results from last 7 days   Lab Units 07/13/24  1729 07/13/24  1539   HSTROP T ng/L 27* 26*     Results from last 7 days   Lab Units 07/14/24  0235 07/13/24  1539   WBC 10*3/mm3 6.55 6.68   HEMOGLOBIN g/dL 15.4 15.4   PLATELETS 10*3/mm3 132* 122*     Results from last 7 days   Lab Units 07/13/24  1539   SODIUM mmol/L 142   POTASSIUM mmol/L 4.2   CHLORIDE mmol/L 106   CO2 mmol/L 22.8   BUN mg/dL 19   CREATININE mg/dL 0.91   CALCIUM mg/dL 9.4   GLUCOSE mg/dL 102*   ALT (SGPT) U/L 17   AST (SGOT) U/L 19     Lab Results   Component Value Date    INR 1.33 (H) 07/13/2024    INR 1.02 (L) 11/16/2020    INR 1.37 (L) 01/16/2020    INR 1.21 (L) 12/13/2019    INR 1.21 (L) 12/12/2019    INR 1.21 (L) 12/10/2019    INR 1.10 (L) 12/10/2019     Lab Results   Component Value Date    MG 1.92 12/13/2019    MG 1.95 12/12/2019     Lab Results   Component Value Date    TSH 1.630 01/26/2022    TRIG 49 01/26/2022    HDL 32 (L) 01/26/2022    LDL 77 01/26/2022      Lab Results   Component Value Date    BNP 1152 (H) 01/13/2021    BNP 1325 (H) 11/15/2020    BNP 4479 (H) 12/10/2019       EKG:     Imaging Results (Last 72 Hours)       ** No results found for the last 72 hours. **             Thank you very much for asking us to be involved in this patient's care.  We will follow along with you.    Kirill Grover PA-C   07/14/24  09:18 EDT

## 2024-07-14 NOTE — PROGRESS NOTES
HEPARIN INFUSION  Con Colón is a  49 y.o. male receiving heparin infusion.     Therapy for (VTE/Cardiac):   Cardiac  Patient Dosing Weight: 173 kg  Initial Bolus (Y/N):   NO  Any Bolus (Y/N):   YES        Signs or Symptoms of Bleeding: NO    Cardiac or Other (Not VTE)   Initial Bolus: 60 units/kg (Max 4,000 units)  Initial rate: 12 units/kg/hr (Max 1,000 units/hr)   Anti Xa Rebolus Infusion Hold time Change infusion Dose (Units/kg/hr) Next Anti Xa or aPTT Level Due   < 0.11 50 Units/kg  (4000 Units Max) None Increase by  3 Units/kg/hr 6 hours   0.11- 0.19 25 Units/kg  (2000 Units Max) None Increase by  2 Units/kg/hr 6 hours   0.2 - 0.29 0 None Increase by  1 Units/kg/hr 6 hours   0.3 - 0.5 0 None No Change 6 hours (after 2 consecutive levels in range check q24h @0700)   0.51 - 0.6 0 None Decrease by  1 Units/kg/hr 6 hours   0.61 - 0.8 0 30 Minutes Decrease by  2 Units/kg/hr 6 hours   0.81 - 1 0 60 Minutes Decrease by  3 Units/kg/hr 6 hours   >1 0 Hold  After Anti Xa less than 0.5 decrease previous rate by  4 Units/kg/hr  Every 2 hours until Anti Xa  less than 0.5 then when infusion restarts in 6 hours         Recommend anti-Xa every 6 hours.          Date   Time   Anti-Xa Current Rate (Unit/kg/hr) Bolus   (Units) Rate Change   (Unit/kg/hr) New Rate (Unit/kg/hr) Next   Anti-Xa Comments  Pump Check Daily   07/13 21:30 0.72 0 0 +5.7 5.7 0300 D/w  roopa the nurse. Start of heparin . No ss of bleeding.                                                                                                                                                                                                                                   Pharmacy will continue to follow anti-Xa results and monitor for signs and symptoms of bleeding or thrombosis.    Brenda Reyez, PharmD  07/13/24 21:16 EDT   HEPARIN INFUSION  Con Colón is a  49 y.o. male receiving heparin infusion.     Therapy for (VTE/Cardiac):   Cardiac  Patient Dosing  Weight: 173 kg  Initial Bolus (Y/N):   NO  Any Bolus (Y/N):   YES        Signs or Symptoms of Bleeding: NO    Cardiac or Other (Not VTE)   Initial Bolus: 60 units/kg (Max 4,000 units)  Initial rate: 12 units/kg/hr (Max 1,000 units/hr)   Anti Xa Rebolus Infusion Hold time Change infusion Dose (Units/kg/hr) Next Anti Xa or aPTT Level Due   < 0.11 50 Units/kg  (4000 Units Max) None Increase by  3 Units/kg/hr 6 hours   0.11- 0.19 25 Units/kg  (2000 Units Max) None Increase by  2 Units/kg/hr 6 hours   0.2 - 0.29 0 None Increase by  1 Units/kg/hr 6 hours   0.3 - 0.5 0 None No Change 6 hours (after 2 consecutive levels in range check q24h @0700)   0.51 - 0.6 0 None Decrease by  1 Units/kg/hr 6 hours   0.61 - 0.8 0 30 Minutes Decrease by  2 Units/kg/hr 6 hours   0.81 - 1 0 60 Minutes Decrease by  3 Units/kg/hr 6 hours   >1 0 Hold  After Anti Xa less than 0.5 decrease previous rate by  4 Units/kg/hr  Every 2 hours until Anti Xa  less than 0.5 then when infusion restarts in 6 hours         Recommend anti-Xa every 6 hours.          Date   Time   Anti-Xa Current Rate (Unit/kg/hr) Bolus   (Units) Rate Change   (Unit/kg/hr) New Rate (Unit/kg/hr) Next   Anti-Xa Comments  Pump Check Daily   07/13 21:30 0.72 0 0 +5.7 5.7 0300 D/w  roopa barrett nurse. Start of heparin . No ss of bleeding.     07/14 0435 0.67 5.7 - -2 3.7 1130 Hold x 30 min and decrease rate by 2u/kg/hr.  No s/s bleeding per FEI Ambrose    7/14 1147 0.35 3.7 - - 3.7 1900 Therapeutic , no rate change, no s/s bleeding, d/w   Jose ENAMORADO                                                                                                                                                                                                           Pharmacy will continue to follow anti-Xa results and monitor for signs and symptoms of bleeding or thrombosis.      radha

## 2024-07-15 ENCOUNTER — APPOINTMENT (OUTPATIENT)
Dept: NUCLEAR MEDICINE | Facility: HOSPITAL | Age: 49
End: 2024-07-15
Payer: MEDICAID

## 2024-07-15 ENCOUNTER — APPOINTMENT (OUTPATIENT)
Dept: CARDIOLOGY | Facility: HOSPITAL | Age: 49
End: 2024-07-15
Payer: MEDICAID

## 2024-07-15 ENCOUNTER — APPOINTMENT (OUTPATIENT)
Dept: CT IMAGING | Facility: HOSPITAL | Age: 49
End: 2024-07-15
Payer: MEDICAID

## 2024-07-15 ENCOUNTER — APPOINTMENT (OUTPATIENT)
Dept: ULTRASOUND IMAGING | Facility: HOSPITAL | Age: 49
End: 2024-07-15
Payer: MEDICAID

## 2024-07-15 PROBLEM — R07.9 CHEST PAIN: Status: ACTIVE | Noted: 2024-07-15

## 2024-07-15 LAB
ANION GAP SERPL CALCULATED.3IONS-SCNC: 11 MMOL/L (ref 5–15)
BH CV NUCLEAR PRIOR STUDY: 3
BH CV REST NUCLEAR ISOTOPE DOSE: 10 MCI
BH CV STRESS BP STAGE 1: NORMAL
BH CV STRESS COMMENTS STAGE 1: NORMAL
BH CV STRESS DOSE REGADENOSON STAGE 1: 0.4
BH CV STRESS DURATION MIN STAGE 1: 0
BH CV STRESS DURATION SEC STAGE 1: 10
BH CV STRESS HR STAGE 1: 64
BH CV STRESS NUCLEAR ISOTOPE DOSE: 31.1 MCI
BH CV STRESS PROTOCOL 1: NORMAL
BH CV STRESS RECOVERY BP: NORMAL MMHG
BH CV STRESS RECOVERY HR: 61 BPM
BH CV STRESS STAGE 1: 1
BUN SERPL-MCNC: 19 MG/DL (ref 6–20)
BUN/CREAT SERPL: 20.9 (ref 7–25)
CALCIUM SPEC-SCNC: 9.1 MG/DL (ref 8.6–10.5)
CHLORIDE SERPL-SCNC: 107 MMOL/L (ref 98–107)
CO2 SERPL-SCNC: 22 MMOL/L (ref 22–29)
CREAT SERPL-MCNC: 0.91 MG/DL (ref 0.76–1.27)
DEPRECATED RDW RBC AUTO: 51.7 FL (ref 37–54)
EGFRCR SERPLBLD CKD-EPI 2021: 104.6 ML/MIN/1.73
ERYTHROCYTE [DISTWIDTH] IN BLOOD BY AUTOMATED COUNT: 15.2 % (ref 12.3–15.4)
GLUCOSE SERPL-MCNC: 111 MG/DL (ref 65–99)
HBA1C MFR BLD: 6.5 % (ref 4.8–5.6)
HCT VFR BLD AUTO: 49.9 % (ref 37.5–51)
HGB BLD-MCNC: 15.7 G/DL (ref 13–17.7)
LV EF NUC BP: 61 %
MAXIMAL PREDICTED HEART RATE: 173 BPM
MCH RBC QN AUTO: 29.2 PG (ref 26.6–33)
MCHC RBC AUTO-ENTMCNC: 31.5 G/DL (ref 31.5–35.7)
MCV RBC AUTO: 92.8 FL (ref 79–97)
PERCENT MAX PREDICTED HR: 36.99 %
PLATELET # BLD AUTO: 150 10*3/MM3 (ref 140–450)
PMV BLD AUTO: 10.3 FL (ref 6–12)
POTASSIUM SERPL-SCNC: 4.3 MMOL/L (ref 3.5–5.2)
RBC # BLD AUTO: 5.38 10*6/MM3 (ref 4.14–5.8)
SODIUM SERPL-SCNC: 140 MMOL/L (ref 136–145)
STRESS BASELINE BP: NORMAL MMHG
STRESS BASELINE HR: 63 BPM
STRESS PERCENT HR: 44 %
STRESS POST PEAK BP: NORMAL MMHG
STRESS POST PEAK HR: 64 BPM
STRESS TARGET HR: 147 BPM
UFH PPP CHRO-ACNC: 0.12 IU/ML (ref 0.3–0.7)
UFH PPP CHRO-ACNC: 0.15 IU/ML (ref 0.3–0.7)
UFH PPP CHRO-ACNC: 0.24 IU/ML (ref 0.3–0.7)
WBC NRBC COR # BLD AUTO: 6.57 10*3/MM3 (ref 3.4–10.8)

## 2024-07-15 PROCEDURE — 85520 HEPARIN ASSAY: CPT

## 2024-07-15 PROCEDURE — 25010000002 HEPARIN (PORCINE) PER 1000 UNITS: Performed by: INTERNAL MEDICINE

## 2024-07-15 PROCEDURE — 25010000002 HEPARIN (PORCINE) 25000-0.45 UT/250ML-% SOLUTION: Performed by: INTERNAL MEDICINE

## 2024-07-15 PROCEDURE — 93970 EXTREMITY STUDY: CPT

## 2024-07-15 PROCEDURE — 83036 HEMOGLOBIN GLYCOSYLATED A1C: CPT | Performed by: INTERNAL MEDICINE

## 2024-07-15 PROCEDURE — 85027 COMPLETE CBC AUTOMATED: CPT | Performed by: INTERNAL MEDICINE

## 2024-07-15 PROCEDURE — 80048 BASIC METABOLIC PNL TOTAL CA: CPT | Performed by: INTERNAL MEDICINE

## 2024-07-15 PROCEDURE — 99232 SBSQ HOSP IP/OBS MODERATE 35: CPT | Performed by: INTERNAL MEDICINE

## 2024-07-15 PROCEDURE — 0 TECHNETIUM SESTAMIBI: Performed by: INTERNAL MEDICINE

## 2024-07-15 PROCEDURE — 93017 CV STRESS TEST TRACING ONLY: CPT

## 2024-07-15 PROCEDURE — 78452 HT MUSCLE IMAGE SPECT MULT: CPT

## 2024-07-15 PROCEDURE — 85520 HEPARIN ASSAY: CPT | Performed by: INTERNAL MEDICINE

## 2024-07-15 PROCEDURE — A9500 TC99M SESTAMIBI: HCPCS | Performed by: INTERNAL MEDICINE

## 2024-07-15 PROCEDURE — 73200 CT UPPER EXTREMITY W/O DYE: CPT

## 2024-07-15 PROCEDURE — 94799 UNLISTED PULMONARY SVC/PX: CPT

## 2024-07-15 PROCEDURE — 25010000002 REGADENOSON 0.4 MG/5ML SOLUTION: Performed by: INTERNAL MEDICINE

## 2024-07-15 RX ORDER — REGADENOSON 0.08 MG/ML
0.4 INJECTION, SOLUTION INTRAVENOUS
Status: COMPLETED | OUTPATIENT
Start: 2024-07-15 | End: 2024-07-15

## 2024-07-15 RX ORDER — LISINOPRIL 10 MG/1
40 TABLET ORAL DAILY
Status: DISCONTINUED | OUTPATIENT
Start: 2024-07-15 | End: 2024-07-16 | Stop reason: HOSPADM

## 2024-07-15 RX ORDER — CETIRIZINE HYDROCHLORIDE 10 MG/1
10 TABLET ORAL DAILY
Status: DISCONTINUED | OUTPATIENT
Start: 2024-07-15 | End: 2024-07-16 | Stop reason: HOSPADM

## 2024-07-15 RX ORDER — HEPARIN SODIUM 5000 [USP'U]/ML
2000 INJECTION, SOLUTION INTRAVENOUS; SUBCUTANEOUS ONCE
Status: COMPLETED | OUTPATIENT
Start: 2024-07-15 | End: 2024-07-15

## 2024-07-15 RX ORDER — FUROSEMIDE 40 MG/1
40 TABLET ORAL DAILY
Status: DISCONTINUED | OUTPATIENT
Start: 2024-07-15 | End: 2024-07-16 | Stop reason: HOSPADM

## 2024-07-15 RX ORDER — ALBUTEROL SULFATE 2.5 MG/3ML
2.5 SOLUTION RESPIRATORY (INHALATION) EVERY 6 HOURS PRN
Status: DISCONTINUED | OUTPATIENT
Start: 2024-07-15 | End: 2024-07-16 | Stop reason: HOSPADM

## 2024-07-15 RX ORDER — HEPARIN SODIUM 10000 [USP'U]/100ML
11.7 INJECTION, SOLUTION INTRAVENOUS
Status: DISCONTINUED | OUTPATIENT
Start: 2024-07-15 | End: 2024-07-16 | Stop reason: HOSPADM

## 2024-07-15 RX ORDER — BUDESONIDE AND FORMOTEROL FUMARATE DIHYDRATE 160; 4.5 UG/1; UG/1
2 AEROSOL RESPIRATORY (INHALATION) 2 TIMES DAILY PRN
Status: DISCONTINUED | OUTPATIENT
Start: 2024-07-15 | End: 2024-07-16 | Stop reason: HOSPADM

## 2024-07-15 RX ADMIN — Medication 10 ML: at 20:19

## 2024-07-15 RX ADMIN — HEPARIN SODIUM 2000 UNITS: 5000 INJECTION, SOLUTION INTRAVENOUS; SUBCUTANEOUS at 03:57

## 2024-07-15 RX ADMIN — ATORVASTATIN CALCIUM 40 MG: 40 TABLET, FILM COATED ORAL at 20:18

## 2024-07-15 RX ADMIN — TECHNETIUM TC 99M SESTAMIBI 1 DOSE: 1 INJECTION INTRAVENOUS at 11:10

## 2024-07-15 RX ADMIN — HEPARIN SODIUM 11.7 UNITS/KG/HR: 10000 INJECTION, SOLUTION INTRAVENOUS at 22:37

## 2024-07-15 RX ADMIN — TECHNETIUM TC 99M SESTAMIBI 1 DOSE: 1 INJECTION INTRAVENOUS at 09:15

## 2024-07-15 RX ADMIN — HEPARIN SODIUM 2000 UNITS: 5000 INJECTION INTRAVENOUS; SUBCUTANEOUS at 21:32

## 2024-07-15 RX ADMIN — METOPROLOL TARTRATE 25 MG: 25 TABLET, FILM COATED ORAL at 20:19

## 2024-07-15 RX ADMIN — REGADENOSON 0.4 MG: 0.08 INJECTION, SOLUTION INTRAVENOUS at 11:10

## 2024-07-15 RX ADMIN — HEPARIN SODIUM 8.7 UNITS/KG/HR: 10000 INJECTION, SOLUTION INTRAVENOUS at 04:03

## 2024-07-15 NOTE — PROGRESS NOTES
HEPARIN INFUSION  Con Colón is a  49 y.o. male receiving heparin infusion.     Therapy for (VTE/Cardiac):   Cardiac  Patient Dosing Weight: 173 kg  Initial Bolus (Y/N):   NO  Any Bolus (Y/N):   YES        Signs or Symptoms of Bleeding: NO    Cardiac or Other (Not VTE)   Initial Bolus: 60 units/kg (Max 4,000 units)  Initial rate: 12 units/kg/hr (Max 1,000 units/hr)   Anti Xa Rebolus Infusion Hold time Change infusion Dose (Units/kg/hr) Next Anti Xa or aPTT Level Due   < 0.11 50 Units/kg  (4000 Units Max) None Increase by  3 Units/kg/hr 6 hours   0.11- 0.19 25 Units/kg  (2000 Units Max) None Increase by  2 Units/kg/hr 6 hours   0.2 - 0.29 0 None Increase by  1 Units/kg/hr 6 hours   0.3 - 0.5 0 None No Change 6 hours (after 2 consecutive levels in range check q24h @0700)   0.51 - 0.6 0 None Decrease by  1 Units/kg/hr 6 hours   0.61 - 0.8 0 30 Minutes Decrease by  2 Units/kg/hr 6 hours   0.81 - 1 0 60 Minutes Decrease by  3 Units/kg/hr 6 hours   >1 0 Hold  After Anti Xa less than 0.5 decrease previous rate by  4 Units/kg/hr  Every 2 hours until Anti Xa  less than 0.5 then when infusion restarts in 6 hours         Recommend anti-Xa every 6 hours.          Date   Time   Anti-Xa Current Rate (Unit/kg/hr) Bolus   (Units) Rate Change   (Unit/kg/hr) New Rate (Unit/kg/hr) Next   Anti-Xa Comments  Pump Check Daily   07/13 21:30 0.72 0 0 +5.7 5.7 0300 D/w  roopa barrett nurse. Start of heparin . No ss of bleeding.     07/14 0435 0.67 5.7 - -2 3.7 1130 Hold x 30 min and decrease rate by 2u/kg/hr.  No s/s bleeding per FEI Ambrose    7/14 1147 0.35 3.7 - - 3.7 1900 Therapeutic , no rate change, no s/s bleeding, d/w   Jose ENAMORADO   7/14 2038 0.11 3.7 4000 +3 6.7 0300 D/w roopa ENAMORADO. No ss of bleeding . Bolus and increase rate.      07/15 0400 0.15 6.7 2000 +2 8.7 1000 Increase rate plus bolus, no s/s bleeding per FEI Correia    07/15 0955 0.24 8.7 - +1 9.7 1800 Discussed with Sreedhar Enamorado. No S/S of bleeding                                                                                                                                                                           Pharmacy will continue to follow anti-Xa results and monitor for signs and symptoms of bleeding or thrombosis.    Tanya VelizD

## 2024-07-15 NOTE — PLAN OF CARE
Goal Outcome Evaluation: Pt has verbalized no complaints this shift. 1/2 NS with sodium bicarb continues to infuse @ 50 ml/hr. Pt with adequate UOP this shift. Will continue to monitor & follow plan of care.

## 2024-07-15 NOTE — PROGRESS NOTES
[facility] HOSPITALIST PROGRESS NOTE    Subjective     History:   Con Colón is a 49 y.o. male admitted on 7/13/2024 secondary to NSTEMI (non-ST elevated myocardial infarction)     Procedures:   7/15/24: Nuclear stress test    Myocardial perfusion imaging indicates a normal myocardial perfusion study with no evidence of ischemia.    Left ventricular ejection fraction is normal (Calculated EF = 61%).    TID 1.12.    Findings consistent with a normal ECG stress test.    CC: Follow up chest pain    Patient seen and examined with FEI Eli. Awake and alert. No further episodes of CP reported. No reported dyspnea or palpitations. No reported vomiting. No acute events overnight per RN.     History taken from: patient, chart, and RN.      Objective     Vital Signs  Temp:  [97.6 °F (36.4 °C)-98.7 °F (37.1 °C)] 97.6 °F (36.4 °C)  Heart Rate:  [53-70] 62  Resp:  [20] 20  BP: ()/(52-80) 135/79    Intake/Output Summary (Last 24 hours) at 7/15/2024 1650  Last data filed at 7/15/2024 1626  Gross per 24 hour   Intake 1630.82 ml   Output --   Net 1630.82 ml         Physical Exam:  General:    Awake, alert, in no acute distress, morbidly obese    Heart:      Normal S1 and S2. Regular rate and rhythm. No significant murmur, rubs or gallops appreciated.   Lungs:     Respirations regular, even and unlabored. Lungs clear to auscultation B/L. No wheezes, rales or rhonchi.   Abdomen:   Soft and nontender. No guarding, rebound tenderness or  organomegaly noted. Bowel sounds present x 4.   Extremities:  (+) bilateral lower extremity edema. Moves UE and LE equally B/L.     Results Review:    Results from last 7 days   Lab Units 07/15/24  0300 07/14/24  0235 07/13/24  1539   WBC 10*3/mm3 6.57 6.55 6.68   HEMOGLOBIN g/dL 15.7 15.4 15.4   PLATELETS 10*3/mm3 150 132* 122*     Results from last 7 days   Lab Units 07/15/24  0408 07/13/24  1539   SODIUM mmol/L 140 142   POTASSIUM mmol/L 4.3 4.2   CHLORIDE mmol/L 107 106    CO2 mmol/L 22.0 22.8   BUN mg/dL 19 19   CREATININE mg/dL 0.91 0.91   CALCIUM mg/dL 9.1 9.4   GLUCOSE mg/dL 111* 102*     Results from last 7 days   Lab Units 07/13/24  1539   BILIRUBIN mg/dL 1.2   ALK PHOS U/L 111   AST (SGOT) U/L 19   ALT (SGPT) U/L 17         Results from last 7 days   Lab Units 07/13/24  1845   INR  1.33*     Results from last 7 days   Lab Units 07/13/24  1729 07/13/24  1539   HSTROP T ng/L 27* 26*       Imaging Results (Last 24 Hours)       Procedure Component Value Units Date/Time    CT Upper Extremity Left Without Contrast [[id number]] Collected: 07/15/24 1602     Updated: 07/15/24 1607    Narrative:      PROCEDURE: CT UPPER EXTREMITY LEFT WO CONTRAST-     HISTORY: Concern for hematoma; I21.4-Non-ST elevation (NSTEMI)  myocardial infarction     COMPARISON: None .     PROCEDURE: Axial images were obtained through the left forearm by  computed tomography. Sagittal and coronal reconstruction images were  performed. This study was performed with techniques to keep radiation  doses as low as reasonably achievable (ALARA). Individualized dose  reduction techniques using automated exposure control or adjustment of  mA and/or kV according to the patient size were employed.         FINDINGS: In the area of the anterior proximal forearm there is  subcutaneous inflammatory stranding which may be secondary to a  hematoma. This measures approximately 4.3 x 1.0 cm. There are no acute  osseous abnormalities. The musculature of the forearm appears  unremarkable.       Impression:      4.3 x 1.0 cm subcutaneous inflammatory stranding in the  proximal forearm which may be due to a hematoma.              This report was finalized on 7/15/2024 4:05 PM by Laura Silver M.D..                 Medications:  aspirin, 81 mg, Oral, Daily  atorvastatin, 40 mg, Oral, Nightly  cetirizine, 10 mg, Oral, Daily  furosemide, 40 mg, Oral, Daily  lisinopril, 40 mg, Oral, Daily  [Held by provider] metFORMIN, 1,000 mg,  Oral, BID With Meals  metoprolol tartrate, 25 mg, Oral, Q12H  sodium chloride, 10 mL, Intravenous, Q12H      heparin, 9.7 Units/kg/hr (Dosing Weight), Last Rate: 9.7 Units/kg/hr (07/15/24 1626)  Pharmacy to Dose Heparin,             Assessment & Plan   Chest pain with NSTEMI (possibly type II): T wave changes on EKG. Troponin's mildly elevated with delta of 1. D-dimer negative. Echo reveals an EF of 66-70%, mild LVH and severe pulmonary HTN. S/P nuclear stress test today with no evidence of ischemia. Cont ASA, statin, beta blocker and ACEI. Currently on heparin gtt. Monitor on telemetry. Cardiology input appreciated.     Hx of PE: Currently holding Xarelto pending cardiac eval and in setting of development of LUE hematoma.     LUE hematoma: Developed later today. CT reveals 4.3 x 1.0 cm subcutaneous inflammatory stranding in the proximal forearm. Cont heparin gtt for now and monitor.     Lower extremity edema: Echo as above. Cardiology has ordered venous duplex US. Cont Lasix.     Essential HTN: BP stable. Cont lisinopril, metoprolol and Lasix. Cont to monitor.     DM II, non-insulin dependent: Check HgbA1c. Hold metformin. Cont to monitor.     NELY: Cont CPAP.    Severe pulmonary HTN: Cont Lasix. Cont CPAP in the setting of NELY.     HLD: Cont statin.     Morbid obesity by BMI: Complicates all aspects of care.     DVT PPX: Heparin gtt    Disposition Possible D/C home tomorrow.     Gerardo Orozco DO  07/15/24  16:50 EDT

## 2024-07-15 NOTE — CASE MANAGEMENT/SOCIAL WORK
Discharge Planning Assessment  [facility]     Patient Name: Con Colón  MRN: [id number]  Today's Date: 7/15/2024    Admit Date: 7/13/2024    Plan: This CM met with pt at bedside to discuss discharge.  Pt reports from home alone in [facility]; active ; independent of all ADL; has C-PAP and glucometer; denies further DME needs; denies HH or needs; utilizes [facility]; sees female practioner in [place] however, can not recall name; verified payor [facility] plan of [place]; discharge plan is to return home once medially stable, no discharge needs identified at this time, neighbor to provide transport once medically stable.   Discharge Needs Assessment       Row Name 07/15/24 1256       Living Environment    People in Home alone    Primary Care Provided by self    Provides Primary Care For no one    Family Caregiver if Needed none    Quality of Family Relationships unable to assess    Able to Return to Prior Arrangements yes       Resource/Environmental Concerns    Transportation Concerns none       Transition Planning    Patient/Family Anticipates Transition to home    Patient/Family Anticipated Services at Transition none    Transportation Anticipated family or friend will provide       Discharge Needs Assessment    Readmission Within the Last 30 Days no previous admission in last 30 days    Equipment Currently Used at Home cpap;glucometer    Concerns to be Addressed no discharge needs identified    Anticipated Changes Related to Illness none    Equipment Needed After Discharge none             Discharge Plan       Row Name 07/15/24 1259       Plan    Plan This CM met with pt at bedside to discuss discharge.  Pt reports from home alone in [facility]; active ; independent of all ADL; has C-PAP and glucometer; denies further DME needs; denies HH or needs; utilizes [facility]; sees female practioner in [place] however, can not recall name; verified payor [facility] plan of [place]; discharge  plan is to return home once medially stable, no discharge needs identified at this time, neighbor to provide transport once medically stable.    Patient/Family in Agreement with Plan yes          Aracelis Mata RN

## 2024-07-15 NOTE — PROGRESS NOTES
LOS: 1 day     Name: Con Colón  Age/Sex: 49 y.o. male  :  1975        PCP: Provider, No Known    Principal Problem:    NSTEMI (non-ST elevated myocardial infarction)    Patient seen and examined.  Chart reviewed.  Patient with history of chronic pulm embolism on Xarelto since 2019 or so.  Came in with complaints of chest pains and shortness of breath.  Echocardiogram shows severe pulm hypertension with RV systolic pressure more than 55 mmHg.  Previous echo from 2019 showed RV pressure of 32 mmHg.  Patient has shortness of breath and had new onset of chest pains.  Patient is compliant with his Xarelto.  He is morbidly obese and still using tobacco    On exam  Alert awake and oriented, sensorimotor stroma grossly intact  No wheezing or crackles  Regular rate rhythm, normal S1-S2  Abdomen distended nontender  No edema  Assessment  #1 cardiac.  Patient with chest pains negative stress test today with normal EF.  Patient noted to have severe pulmonary hypertension on echo this admission.  Patient had minimal elevated troponin of 26 and 27.   The pulmonary hypertension could be a manifestation of chronic pulm embolism.  Agree with anticoagulation to continue.  Vasodilator medications, calcium channel blocker may be started for pulm hypertension    Since patient is on chronic anticoagulation May consider getting a cath to rule out any coronary disease as etiology of his pain.  Pain could have a pleuritic component also.    .Electronically signed by Dwayne Espitia MD, 07/15/24, 6:15 PM EDT.      Admission Information: Con Colón is a 49 y.o. male with diabetes mellitus, hypertension, hyperlipidemia, morbid obesity, and history of pulmonary embolism.    Chief Complaint: Chest pain    Interval history: Has remained chest pain-free    Subjective     Patient is awake and seated on the edge of his bed at the time of my visit.  He denies chest pain or shortness of breath at this time.  He reports  that his last dose of Xarelto was 07/13/2024 at 1800.    Vital Signs  Vital Signs (last 72 hrs)         07/12 0700  07/13 0659 07/13 0700  07/14 0659 07/14 0700  07/15 0659 07/15 0700  07/15 1406   Most Recent      Temp (°F)   97.6 -  98.9    97.9 -  98.9      98.4     98.4 (36.9) 07/15 1035    Heart Rate   77 -  81    53 -  89      59     59 07/15 1035    Resp     20      20      20     20 07/15 1035    BP   118/69 -  143/89    98/52 -  148/85      115/76     115/76 07/15 1035    SpO2 (%)   86 -  97    90 -  99      98     98 07/15 1035    Flow (L/min)       2       2 07/15 0403          Temp:  [97.9 °F (36.6 °C)-98.7 °F (37.1 °C)] 98.4 °F (36.9 °C)  Heart Rate:  [53-89] 59  Resp:  [20] 20  BP: ()/(52-85) 115/76  Body mass index is 52.52 kg/m².      Intake/Output Summary (Last 24 hours) at 7/15/2024 1406  Last data filed at 7/15/2024 1317  Gross per 24 hour   Intake 1431.35 ml   Output --   Net 1431.35 ml       Vitals and nursing note reviewed.   Constitutional:       Appearance: Not in distress. Morbidly obese.   Eyes:      Conjunctiva/sclera: Conjunctivae normal.   Pulmonary:      Effort: Pulmonary effort is normal.      Breath sounds: Normal breath sounds.   Cardiovascular:      Normal rate. Regular rhythm.      Murmurs: There is no murmur.   Edema:     Peripheral edema absent.   Skin:     General: Skin is warm and dry.   Neurological:      Mental Status: Alert.         Telemetry: Sinus rhythm 60s     Results Review:   Most Recent Echo Result    Adult Transthoracic Echo Complete w/ Color, Spectral and Contrast if necessary per protocol 7/14/2024 11:28 AM    Interpretation Summary    Left ventricular systolic function is normal. Calculated left ventricular EF = 69% Left ventricular ejection fraction appears to be 66 - 70%.    Left ventricular wall thickness is consistent with mild concentric hypertrophy.    The right ventricular cavity is moderate to severely dilated.    The left atrial cavity is mildly  dilated.    Left atrial volume is mildly increased.    The right atrial cavity is severely  dilated.    Estimated right ventricular systolic pressure from tricuspid regurgitation is markedly elevated (>55 mmHg). Calculated right ventricular systolic pressure from tricuspid regurgitation is 55 mmHg.    The aortic root measures 3.7 cm.    Signed by: Shelley Velasquez MD on 7/14/2024 11:53 AM      Results from last 7 days   Lab Units 07/15/24  0300 07/14/24  0235 07/13/24  1539   WBC 10*3/mm3 6.57 6.55 6.68   HEMOGLOBIN g/dL 15.7 15.4 15.4   PLATELETS 10*3/mm3 150 132* 122*     Results from last 7 days   Lab Units 07/15/24  0408 07/13/24  1539   SODIUM mmol/L 140 142   POTASSIUM mmol/L 4.3 4.2   CHLORIDE mmol/L 107 106   CO2 mmol/L 22.0 22.8   BUN mg/dL 19 19   CREATININE mg/dL 0.91 0.91   CALCIUM mg/dL 9.1 9.4   GLUCOSE mg/dL 111* 102*     Results from last 7 days   Lab Units 07/13/24  1729 07/13/24  1539   HSTROP T ng/L 27* 26*       Results from last 7 days   Lab Units 07/13/24  1845   INR  1.33*       I reviewed the patient's new clinical results.  I reviewed the patient's new imaging results and agree with the interpretation.  I personally viewed and interpreted the patient's EKG/Telemetry data      Medication Review:   aspirin, 81 mg, Oral, Daily  atorvastatin, 40 mg, Oral, Nightly  cetirizine, 10 mg, Oral, Daily  furosemide, 40 mg, Oral, Daily  lisinopril, 40 mg, Oral, Daily  [Held by provider] metFORMIN, 1,000 mg, Oral, BID With Meals  metoprolol tartrate, 25 mg, Oral, Q12H  sodium chloride, 10 mL, Intravenous, Q12H      heparin, 9.7 Units/kg/hr (Dosing Weight), Last Rate: 9.7 Units/kg/hr (07/15/24 1347)  Pharmacy to Dose Heparin,         Assessment:  NSTEMI type I versus type II  Hypertension  Hyperlipidemia  Severe pulmonary hypertension      Recommendations:  Stress test today.  If it indicates ischemia, we will plan for invasive coronary angiogram tomorrow as it would have been more than 48 hours since  his last dose of Xarelto.  He continues on a heparin drip.  Also on aspirin, metoprolol, high intensity statin, and ACE inhibitor  Blood pressure at goal on current medications  Lipid panel ordered  With a negative D-dimer and reported compliance with Xarelto, probability of recurrent PE is low.  Will order bilateral venous ultrasound of the lower extremities    I discussed the patients findings and my recommendations with patient.    Case discussed with Dr. Espitia and plan of care reflects his recommendations.      Electronically signed by JACEK Canela, 07/15/24, 2:14 PM EDT.

## 2024-07-15 NOTE — PLAN OF CARE
Patient resting in the bed throughout the night. No s/s of distress noted. No complaints. Patient on home CPAP, oxygen sat were staying around 85-89%, RT aware, placed on 2L NC along with the home CPAP, DES Miguel APRN aware.  Will continue to follow plan of care.

## 2024-07-16 ENCOUNTER — READMISSION MANAGEMENT (OUTPATIENT)
Dept: CALL CENTER | Facility: HOSPITAL | Age: 49
End: 2024-07-16
Payer: MEDICAID

## 2024-07-16 VITALS
OXYGEN SATURATION: 93 % | BODY MASS INDEX: 44.1 KG/M2 | SYSTOLIC BLOOD PRESSURE: 102 MMHG | RESPIRATION RATE: 20 BRPM | WEIGHT: 315 LBS | TEMPERATURE: 97.3 F | DIASTOLIC BLOOD PRESSURE: 58 MMHG | HEIGHT: 71 IN | HEART RATE: 54 BPM

## 2024-07-16 PROBLEM — I50.32 CHRONIC HEART FAILURE WITH PRESERVED EJECTION FRACTION (HFPEF): Status: ACTIVE | Noted: 2024-07-16

## 2024-07-16 PROBLEM — I21.A1 TYPE 2 MYOCARDIAL INFARCTION: Status: ACTIVE | Noted: 2024-07-16

## 2024-07-16 PROBLEM — I50.9 HEART FAILURE, UNSPECIFIED: Status: ACTIVE | Noted: 2024-07-16

## 2024-07-16 LAB
ALBUMIN SERPL-MCNC: 3.6 G/DL (ref 3.5–5.2)
ALBUMIN/GLOB SERPL: 0.9 G/DL
ALP SERPL-CCNC: 110 U/L (ref 39–117)
ALT SERPL W P-5'-P-CCNC: 16 U/L (ref 1–41)
ANION GAP SERPL CALCULATED.3IONS-SCNC: 11.2 MMOL/L (ref 5–15)
AST SERPL-CCNC: 17 U/L (ref 1–40)
BASOPHILS # BLD AUTO: 0.05 10*3/MM3 (ref 0–0.2)
BASOPHILS NFR BLD AUTO: 0.7 % (ref 0–1.5)
BILIRUB SERPL-MCNC: 1.2 MG/DL (ref 0–1.2)
BUN SERPL-MCNC: 21 MG/DL (ref 6–20)
BUN/CREAT SERPL: 21 (ref 7–25)
CALCIUM SPEC-SCNC: 9.1 MG/DL (ref 8.6–10.5)
CHLORIDE SERPL-SCNC: 107 MMOL/L (ref 98–107)
CO2 SERPL-SCNC: 20.8 MMOL/L (ref 22–29)
CREAT SERPL-MCNC: 1 MG/DL (ref 0.76–1.27)
DEPRECATED RDW RBC AUTO: 52.8 FL (ref 37–54)
EGFRCR SERPLBLD CKD-EPI 2021: 93.4 ML/MIN/1.73
EOSINOPHIL # BLD AUTO: 0.32 10*3/MM3 (ref 0–0.4)
EOSINOPHIL NFR BLD AUTO: 4.2 % (ref 0.3–6.2)
ERYTHROCYTE [DISTWIDTH] IN BLOOD BY AUTOMATED COUNT: 15.4 % (ref 12.3–15.4)
GLOBULIN UR ELPH-MCNC: 3.8 GM/DL
GLUCOSE SERPL-MCNC: 126 MG/DL (ref 65–99)
HCT VFR BLD AUTO: 50.4 % (ref 37.5–51)
HGB BLD-MCNC: 16 G/DL (ref 13–17.7)
IMM GRANULOCYTES # BLD AUTO: 0.05 10*3/MM3 (ref 0–0.05)
IMM GRANULOCYTES NFR BLD AUTO: 0.7 % (ref 0–0.5)
LYMPHOCYTES # BLD AUTO: 1.65 10*3/MM3 (ref 0.7–3.1)
LYMPHOCYTES NFR BLD AUTO: 21.5 % (ref 19.6–45.3)
MCH RBC QN AUTO: 29.7 PG (ref 26.6–33)
MCHC RBC AUTO-ENTMCNC: 31.7 G/DL (ref 31.5–35.7)
MCV RBC AUTO: 93.5 FL (ref 79–97)
MONOCYTES # BLD AUTO: 0.56 10*3/MM3 (ref 0.1–0.9)
MONOCYTES NFR BLD AUTO: 7.3 % (ref 5–12)
NEUTROPHILS NFR BLD AUTO: 5.05 10*3/MM3 (ref 1.7–7)
NEUTROPHILS NFR BLD AUTO: 65.6 % (ref 42.7–76)
NRBC BLD AUTO-RTO: 0 /100 WBC (ref 0–0.2)
PLATELET # BLD AUTO: 142 10*3/MM3 (ref 140–450)
PMV BLD AUTO: 10.6 FL (ref 6–12)
POTASSIUM SERPL-SCNC: 4.3 MMOL/L (ref 3.5–5.2)
PROT SERPL-MCNC: 7.4 G/DL (ref 6–8.5)
RBC # BLD AUTO: 5.39 10*6/MM3 (ref 4.14–5.8)
SODIUM SERPL-SCNC: 139 MMOL/L (ref 136–145)
UFH PPP CHRO-ACNC: 0.4 IU/ML (ref 0.3–0.7)
UFH PPP CHRO-ACNC: <0.1 IU/ML (ref 0.3–0.7)
WBC NRBC COR # BLD AUTO: 7.68 10*3/MM3 (ref 3.4–10.8)

## 2024-07-16 PROCEDURE — 85520 HEPARIN ASSAY: CPT | Performed by: INTERNAL MEDICINE

## 2024-07-16 PROCEDURE — 80053 COMPREHEN METABOLIC PANEL: CPT | Performed by: INTERNAL MEDICINE

## 2024-07-16 PROCEDURE — 99239 HOSP IP/OBS DSCHRG MGMT >30: CPT | Performed by: INTERNAL MEDICINE

## 2024-07-16 PROCEDURE — 85025 COMPLETE CBC W/AUTO DIFF WBC: CPT | Performed by: INTERNAL MEDICINE

## 2024-07-16 RX ORDER — ASPIRIN 81 MG/1
81 TABLET ORAL DAILY
Qty: 30 TABLET | Refills: 0 | Status: ON HOLD | OUTPATIENT
Start: 2024-07-17

## 2024-07-16 RX ORDER — ATORVASTATIN CALCIUM 40 MG/1
40 TABLET, FILM COATED ORAL NIGHTLY
Qty: 30 TABLET | Refills: 0 | Status: ON HOLD | OUTPATIENT
Start: 2024-07-16

## 2024-07-16 RX ADMIN — FUROSEMIDE 40 MG: 40 TABLET ORAL at 09:04

## 2024-07-16 RX ADMIN — ASPIRIN 81 MG: 81 TABLET, COATED ORAL at 09:05

## 2024-07-16 RX ADMIN — LISINOPRIL 40 MG: 10 TABLET ORAL at 09:05

## 2024-07-16 RX ADMIN — METOPROLOL TARTRATE 25 MG: 25 TABLET, FILM COATED ORAL at 09:05

## 2024-07-16 RX ADMIN — Medication 10 ML: at 09:05

## 2024-07-16 RX ADMIN — CETIRIZINE HYDROCHLORIDE 10 MG: 10 TABLET, FILM COATED ORAL at 09:04

## 2024-07-16 NOTE — OUTREACH NOTE
Prep Survey      Flowsheet Row Responses   [facility] facility patient discharged from? [facility]   Is LACE score < 7 ? No   Eligibility Readm Mgmt   Discharge diagnosis NSTEMI (non-ST elevated myocardial infarction)   Does the patient have one of the following disease processes/diagnoses(primary or secondary)? Acute MI (STEMI,NSTEMI)   Does the patient have Home health ordered? No   Is there a DME ordered? No   Prep survey completed? Yes            Savanna RUIZ - Registered Nurse

## 2024-07-16 NOTE — DISCHARGE INSTR - APPOINTMENTS
Pt has an appointment with Halie Dillon July 23 at 10:15 am.   And  soraya wu  for  July 24  at 10 :15    [address] [address] [phone]

## 2024-07-16 NOTE — DISCHARGE SUMMARY
[facility] DISCHARGE SUMMARY      Date of Admission: 7/13/2024    Date of Discharge: 7/16/2024     PCP: Provider, No Known    Admission Diagnosis:   Please see admission H&P    Discharge Diagnosis:   Chest pain  Mild troponin elevation/type II NSTEMI  Essential HTN  Chronic HFpEF  Severe pulmonary HTN  Hx of PE  LUE hematoma  DM II, non-insulin dependent  NELY  HLD  Morbid obesity by BMI    Procedures Performed:  7/15/24: Nuclear stress test    Myocardial perfusion imaging indicates a normal myocardial perfusion study with no evidence of ischemia.    Left ventricular ejection fraction is normal (Calculated EF = 61%).    TID 1.12.    Findings consistent with a normal ECG stress test.     Consults:   Consults       Date and Time Order Name Status Description    7/13/2024  6:40 PM Inpatient Cardiology Consult                History of Present Illness:  Con Colón is a 49 y.o. male who presented to [facility] [place] as a transfer from [facility] for further evaluation of chest pain and mild troponin elevation. Please see admission H&P for complete details.     Hospital Course  Con Colón was admitted to the telemetry floor for further evaluation and treatment. He was continued on ASA and statin. His home Xarelto was held and he was placed on a heparin gtt. Cardiology was consulted for further input.     EKG revealed sinus rhythm with some T wave changes. Serial troponin's were minimally elevated with delta of 1. D-dimer was normal. Echo revealed an EF of 66-70%, mild LVH and severe pulmonary HTN. A stress test was performed with no evidence of ischemia appreciated. Cardiology ultimately recommended to proceed with a [facility] but he was unable to lie flat for procedure. He did not experience any further episodes of CP and in the setting of normal stress test cardiology did not recommend any additional ischemic workup. He developed a small LUE hematoma with CT revealing 4.3 x 1.0 cm subcutaneous  inflammatory stranding in the proximal forearm. He was initially continued on the heparin gtt and transitioned back to his home Xarelto as his hematoma appeared clinically improved with no further procedures planned. He was reevaluated on 7/16 and deemed medically stable for discharge after discussion with cardiology. Instructions were given for follow up with his PCP and cardiology.     Condition on Discharge:  Stable    Vital Signs  Vitals:    07/16/24 1042   BP: 101/62   Pulse: 70   Resp: 20   Temp: 97.7 °F (36.5 °C)   SpO2: 92%       Physical Exam:  General:    Awake, alert, in no acute distress, morbidly obese    Heart:      Normal S1 and S2. Regular rate and rhythm. No significant murmur, rubs or gallops appreciated.   Lungs:     Respirations regular, even and unlabored. Lungs clear to auscultation B/L. No wheezes, rales or rhonchi.   Abdomen:   Soft and nontender. No guarding, rebound tenderness or  organomegaly noted. Bowel sounds present x 4.   Extremities:  (+) bilateral lower extremity edema. Small LUE hematoma/bruising. Moves UE and LE equally B/L.     Discharge Disposition:   home      Discharge Medications:     Discharge Medications        New Medications        Instructions Start Date   aspirin 81 MG EC tablet   81 mg, Oral, Daily   Start Date: July 17, 2024     atorvastatin 40 MG tablet  Commonly known as: LIPITOR   40 mg, Oral, Nightly      empagliflozin 10 MG tablet tablet  Commonly known as: JARDIANCE   10 mg, Oral, Daily      metoprolol tartrate 25 MG tablet  Commonly known as: LOPRESSOR   25 mg, Oral, Every 12 Hours Scheduled             Continue These Medications        Instructions Start Date   albuterol sulfate  (90 Base) MCG/ACT inhaler  Commonly known as: PROVENTIL HFA;VENTOLIN HFA;PROAIR HFA   2 puffs, Inhalation, Every 6 Hours PRN      budesonide-formoterol 160-4.5 MCG/ACT inhaler  Commonly known as: SYMBICORT   2 puffs, Inhalation, 2 Times Daily PRN      cetirizine 10 MG  tablet  Commonly known as: zyrTEC   10 mg, Oral, Daily      furosemide 40 MG tablet  Commonly known as: LASIX   40 mg, Oral, Daily      lisinopril 40 MG tablet  Commonly known as: PRINIVIL,ZESTRIL   40 mg, Oral, Daily      metFORMIN 1000 MG tablet  Commonly known as: GLUCOPHAGE   1,000 mg, Oral, 2 Times Daily With Meals      Xarelto 20 MG tablet  Generic drug: rivaroxaban   20 mg, Oral, Daily                 Discharge Diet:   Dietary Orders (From admission, onward)       Start     Ordered    07/15/24 1204  Diet: Cardiac; Healthy Heart (2-3 Na+); Fluid Consistency: Thin (IDDSI 0)  Diet Effective Now        References:    Diet Order [facility]   Question Answer Comment   Diets: Cardiac    Cardiac Diet: Healthy Heart (2-3 Na+)    Fluid Consistency: Thin (IDDSI 0)        07/15/24 1203                    Activity at Discharge:  activity as tolerated    Follow-up Appointments:  Additional Instructions for the Follow-ups that You Need to Schedule       Discharge Follow-up with PCP   As directed       Currently Documented PCP:    Provider, No Known    PCP Phone Number:    None     Follow Up Details: Follow up with PCP in 1 week.        Discharge Follow-up with Specified Provider: Follow up with cardiology in 2 weeks.   As directed      To: Follow up with cardiology in 2 weeks.               Follow-up Information       Provider, No Known .    Why: Follow up with PCP in 1 week.  Contact information:  [facility] [id number]                                 Test Results Pending at Discharge:  Pending Labs       Order Current Status    Heparin Anti-Xa In process             The ASCVD Risk score (Albertina VALDES, et al., 2019) failed to calculate for the following reasons:    The patient has a prior MI or stroke diagnosis      Gerardo Orozco DO  07/16/24  11:29 EDT      Time: Greater than 30 minutes spent on this discharge.

## 2024-07-16 NOTE — PAYOR COMM NOTE
"CONTACT:  CATIE MENDOZA RN  UTILIZATION MANAGEMENT DEPT.   [facility]   [address] [facility] [address], [address]   PHONE:  [phone]   FAX: [phone]         ADMIT TO OBSERVATION 7/13/24--CONVERTED TO INPATIENT 7/15/24          Basia Colón (49 y.o. Male)       Date of Birth   1975    Social Security Number       Address   [address] [facility] [address]    Home Phone   [phone]    MRN   [id number]       [employer]    Marital Status                               Admission Date   7/13/24    Admission Type   Urgent    Admitting Provider   Uday Kaplan DO    Attending Provider   Gerardo Orozco DO    Department, Room/Bed   [address], 3303/2S       Discharge Date       Discharge Disposition       Discharge Destination                                 Attending Provider: Gerardo Orozco DO    Allergies: Nuvigil [Armodafinil], Provigil [Modafinil]    Isolation: None   Infection: None   Code Status: CPR    Ht: 180.3 cm (71\")   Wt: 172 kg (379 lb 13.6 oz)    Admission Cmt: None   Principal Problem: NSTEMI (non-ST elevated myocardial infarction) [I21.4]                   Active Insurance as of 7/13/2024       Primary Coverage       Payor Plan Insurance Group Employer/Plan Group    [facility] COMMUNITY PLAN [facility] COMMUNITY PLAN [facility]       Payor Plan Address Payor Plan Phone Number Payor Plan Fax Number Effective Dates    [address]   1/1/2021 - None Entered    [facility] [id number]         Subscriber Name Subscriber Birth Date Member ID       BASIA COLÓN 1975 [id number]                     Emergency Contacts        (Rel.) Home Phone Work Phone Mobile Phone    JAJAPATRICIABENJIE (Friend) -- -- [phone]                 History & Physical        Uday Kaplan DO at 07/13/24 1820              [facility] HOSPITALIST HISTORY AND PHYSICAL    Patient Identification:  Name:  " Con Colón  Age:  49 y.o.  Sex:  male  :  1975  MRN:  [id number]   Admit Date: 2024   Visit Number:  [id number]  Primary Care Physician:  Provider, No Known       Chief complaint: Chest pain    History of presenting illness: Mr. Colón is a 49 y.o. male who presented to [facility] as a transfer from [facility].  Patient states 1 day prior he began having sudden onset chest pain which she described as a pressure type sensation which he rated to a 7 to an 8 on a 1-10 pain scale.  He reported no associated shortness of breath with diaphoresis but denied any fevers, chills, rigors or any other symptoms.  For this reason, patient did present to [facility] for further treatment and evaluation.  Initial evaluation in the emergency department did consist of basic laboratory work as well as physical exam and vital signs.  According to verbal communication via telephone with the ER provider at [facility], patient did have essentially normal CBC and CMP but did have ST changes on EKG consistent with lateral ischemia with mildly elevated troponin.  For this reason, patient was transferred for further treatment and evaluation of suspected NSTEMI.  -------------------------------------------------------------------------------------------------------------------  Past Medical History:   Diagnosis Date    Cardiomegaly     COPD (chronic obstructive pulmonary disease)     Hypertension     Pulmonary embolism     PAST    Sleep apnea      History reviewed. No pertinent surgical history.  Family History   Problem Relation Age of Onset    Diabetes Other      Social History     Socioeconomic History    Marital status:    Tobacco Use    Smoking status: Former    Smokeless tobacco: Current     Types: Chew    Tobacco comments:     2019 QUIT   Vaping Use    Vaping status: Never Used    Passive vaping exposure: Yes   Substance and Sexual Activity    Alcohol use:  Never    Drug use: Never    Sexual activity: Defer     ---------------------------------------------------------------------------------------------------------------------   Allergies:  Nuvigil [armodafinil] and Provigil [modafinil]  ---------------------------------------------------------------------------------------------------------------------   Prior to Admission Medications       Prescriptions Last Dose Informant Patient Reported? Taking?    furosemide (LASIX) 40 MG tablet   No No    Take 1 tablet by mouth Daily.    furosemide (LASIX) 40 MG tablet   No No    Take 1 tablet by mouth 2 (Two) Times a Day.    glucose blood test strip   No No    Use 4 times daily as needed to check glucose    lisinopril (PRINIVIL,ZESTRIL) 40 MG tablet   No No    TAKE 1 TABLET BY MOUTH DAILY    metFORMIN (GLUCOPHAGE) 1000 MG tablet   No No    TAKE 1 TABLET BY MOUTH TWICE DAILY WITH MEALS    multivitamin with minerals tablet tablet   Yes No    Take 1 tablet by mouth Daily.    Xarelto 20 MG tablet   No No    TAKE 1 TABLET BY MOUTH DAILY          Hospital Scheduled Meds:  enoxaparin, 40 mg, Subcutaneous, Q12H  sodium chloride, 10 mL, Intravenous, Q12H      Pharmacy to Dose enoxaparin (LOVENOX),       ---------------------------------------------------------------------------------------------------------------------   Review of Systems   On review of systems the patient denies the following unless noted above:     Constitutional:  Fevers, chills, weight change, fatigue     Eyes: Vision changes, headache, double vision, loss of vision     ENT: Runny nose, nose bleeds, ringing in ears, pain with swallowing, sore throat     Cardiovascular: Chest pains, palpitations, PND, orthopnea     Respiratory: Cough, wheezing, SOA, hemoptysis     GI:  Abdominal pain, diarrhea, constipation, change in stool caliber,    Rectal bleeding, vomiting or nausea     : Difficulty voiding, dysuria, hematuria     Musculoskeletal: Changes of any chronic joint  pain, swelling     Skin: Rash, itching, easy bruisability     Neurological: Unilateral weakness, new onset numbness, speech difficulties     Psychiatric: Sadness, tearfulness, feelings of hopelessness, racing thoughts     Endocrine:  Heat or cold intolerance, mood swings, polydipsia, polyphagia,    recent hypoglycemia  --------------------------------------------------------------------------------------------------------------------  Vital Signs:  Temp:  [98.9 °F (37.2 °C)] 98.9 °F (37.2 °C)  Heart Rate:  [71-81] 81  Resp:  [20] 20  BP: (123-143)/(80-89) 143/89      07/13/24  1435   Weight: (!) 173 kg (380 lb 9.6 oz)     Body mass index is 53.08 kg/m².  ---------------------------------------------------------------------------------------------------------------------   Physical exam:  Constitutional: Well-nourished  male in no apparent distress.     HENT:  Head:  Normocephalic and atraumatic.  Mouth:  Moist mucous membranes.    Eyes:  Conjunctivae and EOM are normal.  Pupils are equal, round, and reactive to light.  No scleral icterus.    Neck:  Neck supple. No thyromegaly.  No JVD present.    Cardiovascular:  Regular rate and rhythm with no murmurs, rubs, clicks or gallops appreciated.  Pulmonary/Chest:  Clear to auscultation bilaterally with no crackles, wheezes or rhonchi appreciated.  Abdominal:  Soft. Nontender. Nondistended  Bowel sounds are normal in all four quadrants. No organomegally appreciated.   Musculoskeletal:  5/5 muscle strength bilateral upper and lower extermties with equal muscle tone and bulk. No edema, no tenderness, and no deformity.  No red or swollen joints anywhere.    Neurological:  Alert, Cranial nerves II-XII intact with no focal defecits.  No facial droop.  No slurred speech.   Skin:  Warm and dry to palpation with no rashes or lesions appreciated.  Peripheral vascular:  2+ radial and pedal pulses in bilateral upper and lower extremities.  Psychiatric:  Alert and oriented  "x3, demonstrates appropriate judgement and insight.  ---------------------------------------------------------------------------------------------------------------------  EKG: Normal sinus rhythm with ventricular rate in the 70s with ST depression in leads V3 through V6.  No ST elevation appreciated  ---------------------------------------------------------------------------------------------------------------------   Results from last 7 days   Lab Units 07/13/24  1539   WBC 10*3/mm3 6.68   HEMOGLOBIN g/dL 15.4   HEMATOCRIT % 47.2   MCV fL 92.4   MCHC g/dL 32.6   PLATELETS 10*3/mm3 122*         Results from last 7 days   Lab Units 07/13/24  1539   SODIUM mmol/L 142   POTASSIUM mmol/L 4.2   CHLORIDE mmol/L 106   CO2 mmol/L 22.8   BUN mg/dL 19   CREATININE mg/dL 0.91   CALCIUM mg/dL 9.4   GLUCOSE mg/dL 102*   ALBUMIN g/dL 3.8   BILIRUBIN mg/dL 1.2   ALK PHOS U/L 111   AST (SGOT) U/L 19   ALT (SGPT) U/L 17   Estimated Creatinine Clearance: 161.8 mL/min (by C-G formula based on SCr of 0.91 mg/dL).  No results found for: \"AMMONIA\"  Results from last 7 days   Lab Units 07/13/24  1729 07/13/24  1539   HSTROP T ng/L 27* 26*         Lab Results   Component Value Date    HGBA1C 7.19 (H) 01/26/2022     Lab Results   Component Value Date    TSH 1.630 01/26/2022     No results found for: \"PREGTESTUR\", \"PREGSERUM\", \"HCG\", \"HCGQUANT\"  Pain Management Panel           No data to display              No results found for: \"BLOODCX\"  No results found for: \"URINECX\"  No results found for: \"WOUNDCX\"  No results found for: \"STOOLCX\"      ---------------------------------------------------------------------------------------------------------------------  Imaging Results (Last 7 Days)       ** No results found for the last 168 hours. **            I have personally reviewed the radiology images and read the final radiology " report.  ---------------------------------------------------------------------------------------------------------------------  Assessment and Plan:    Chest pain -patient with minimally elevated troponins with some typical features.  Will obtain transthoracic echo and consult general cardiology for recommendations regarding stress test versus possible heart catheterization.     2.  Essential hypertension -will obtain home antihypertensive medications and restart once available    3.  History of PE -patient reports taking Xarelto at home, will discontinue and start on heparin drip this evening in anticipation of possible need for heart cath in the near future    4.  Obesity -complicates all aspects of care    Uday Kaplan DO  07/13/24  18:20 EDT    Electronically signed by Uday Kaplan DO at 07/13/24 1841       Emergency Department Notes    No notes of this type exist for this encounter.       Facility-Administered Medications as of 7/16/2024   Medication Dose Route Frequency Provider Last Rate Last Admin    albuterol (PROVENTIL) nebulizer solution 0.083% 2.5 mg/3mL  2.5 mg Nebulization Q6H PRN Gerardo Orozco DO        aspirin EC tablet 81 mg  81 mg Oral Daily Kirill Grover PA-C   81 mg at 07/16/24 0905    atorvastatin (LIPITOR) tablet 40 mg  40 mg Oral Nightly Kirill Grover PA-C   40 mg at 07/15/24 2018    sennosides-docusate (PERICOLACE) 8.6-50 MG per tablet 2 tablet  2 tablet Oral BID PRN Uday Kaplan DO        And    polyethylene glycol (MIRALAX) packet 17 g  17 g Oral Daily PRN Uday Kaplan DO        And    bisacodyl (DULCOLAX) EC tablet 5 mg  5 mg Oral Daily PRN Uday Kaplan DO        And    bisacodyl (DULCOLAX) suppository 10 mg  10 mg Rectal Daily PRN Uday Kaplan, DO        budesonide-formoterol (SYMBICORT) 160-4.5 MCG/ACT inhaler 2 puff  2 puff Inhalation BID PRN Gerardo Orozco, DO        cetirizine (zyrTEC)  tablet 10 mg  10 mg Oral Daily Gerardo Orozco DO   10 mg at 07/16/24 0904    furosemide (LASIX) tablet 40 mg  40 mg Oral Daily Gerardo Orozco DO   40 mg at 07/16/24 0904    heparin (porcine) 5000 UNIT/ML injection 2,000 Units  2,000 Units Intravenous PRN Uday Kaplan DO        [COMPLETED] heparin (porcine) 5000 UNIT/ML injection 2,000 Units  2,000 Units Intravenous Once Uday Kaplan DO   2,000 Units at 07/15/24 0357    [COMPLETED] heparin (porcine) 5000 UNIT/ML injection 2,000 Units  2,000 Units Intravenous Once Gerardo Orozco DO   2,000 Units at 07/15/24 2132    heparin (porcine) 5000 UNIT/ML injection 4,000 Units  4,000 Units Intravenous PRN Uday Kaplan DO        [COMPLETED] heparin (porcine) 5000 UNIT/ML injection 4,000 Units  4,000 Units Intravenous Once Uday Kaplan DO   4,000 Units at 07/14/24 2113    heparin 70964 units/250 mL (100 units/mL) in 0.45 % NaCl infusion  11.7 Units/kg/hr (Dosing Weight) Intravenous Titrated Gerardo Orozco DO 20.2 mL/hr at 07/15/24 2237 11.7 Units/kg/hr at 07/15/24 2237    lisinopril (PRINIVIL,ZESTRIL) tablet 40 mg  40 mg Oral Daily Gerardo Orozco DO   40 mg at 07/16/24 0905    [Held by provider] metFORMIN (GLUCOPHAGE) tablet 1,000 mg  1,000 mg Oral BID With Meals Gerardo Orozco DO        metoprolol tartrate (LOPRESSOR) tablet 25 mg  25 mg Oral Q12H Kirill Grover PA-C   25 mg at 07/16/24 0905    Pharmacy to Dose Heparin   Does not apply Continuous PRN Uday Kaplan DO        [COMPLETED] regadenoson (LEXISCAN) injection 0.4 mg  0.4 mg Intravenous Once in imaging Gerardo Orozco DO   0.4 mg at 07/15/24 1110    sodium chloride 0.9 % flush 10 mL  10 mL Intravenous Q12H Uday Kaplan,    10 mL at 07/16/24 0905    sodium chloride 0.9 % flush 10 mL  10 mL Intravenous PRN Uday Kaplan,         sodium chloride 0.9 % infusion 40 mL  40 mL  Intravenous Uday Bryant DO        [COMPLETED] technetium sestamibi (CARDIOLITE) injection 1 dose  1 dose Intravenous Once in imaging Gerardo Orozco DO   1 dose at 07/15/24 0915    [COMPLETED] technetium sestamibi (CARDIOLITE) injection 1 dose  1 dose Intravenous Once in imaging Gerardo Orozco DO   1 dose at 07/15/24 1110     Orders (all)        Start     Ordered    07/17/24 0600  Basic Metabolic Panel  Morning Draw         07/16/24 0835    07/17/24 0600  CBC & Differential  Morning Draw         07/16/24 0835    07/16/24 1100  Heparin Anti-Xa  Timed         07/16/24 0508    07/16/24 1000  Cardiac Catheterization/Vascular Study  Once         07/16/24 0959    07/16/24 0926  Pre-Procedure IV Hydration Not Needed  Once         07/16/24 0926    07/16/24 0600  Comprehensive Metabolic Panel  Morning Draw         07/15/24 0825    07/16/24 0600  CBC & Differential  Morning Draw         07/15/24 0825    07/16/24 0600  CBC Auto Differential  PROCEDURE ONCE         07/15/24 2202    07/16/24 0400  Heparin Anti-Xa  Timed         07/15/24 2127    07/15/24 2215  heparin (porcine) 5000 UNIT/ML injection 2,000 Units  Once         07/15/24 2127    07/15/24 2215  heparin 71946 units/250 mL (100 units/mL) in 0.45 % NaCl infusion  Titrated         07/15/24 2127    07/15/24 2000  Heparin Anti-Xa  Timed         07/15/24 1425    07/15/24 1817  Case Request Cath Lab: Left Heart Cath  Once         07/15/24 1816    07/15/24 1800  Heparin Anti-Xa  Timed,   Status:  Canceled         07/15/24 1121    07/15/24 1701  Hemoglobin A1c  Once         07/15/24 1700    07/15/24 1650  Inpatient Admission  Once         07/15/24 1649    07/15/24 1416  US Venous Doppler Lower Extremity Bilateral (duplex)  1 Time Imaging         07/15/24 1415    07/15/24 1232  CT Upper Extremity Left Without Contrast  1 Time Imaging         07/15/24 1232    07/15/24 1204  Diet: Cardiac; Healthy Heart (2-3 Na+); Fluid Consistency: Thin (IDDSI  0)  Diet Effective Now         07/15/24 1203    07/15/24 1145  regadenoson (LEXISCAN) injection 0.4 mg  Once in Imaging         07/15/24 1057    07/15/24 1115  technetium sestamibi (CARDIOLITE) injection 1 dose  Once in Imaging         07/15/24 1159    07/15/24 1000  Heparin Anti-Xa  Timed         07/15/24 0352    07/15/24 0930  lisinopril (PRINIVIL,ZESTRIL) tablet 40 mg  Daily         07/15/24 0836    07/15/24 0930  cetirizine (zyrTEC) tablet 10 mg  Daily         07/15/24 0836    07/15/24 0930  furosemide (LASIX) tablet 40 mg  Daily         07/15/24 0836    07/15/24 0930  [Held by provider]  metFORMIN (GLUCOPHAGE) tablet 1,000 mg  2 Times Daily With Meals        (On hold since yesterday at 0836 until manually unheld; held by Gerardo Orozco DOHold Reason: Hold For Procedure)    07/15/24 0836    07/15/24 0915  technetium sestamibi (CARDIOLITE) injection 1 dose  Once in Imaging         07/15/24 1050    07/15/24 0837  Stress Test With Myocardial Perfusion One Day  Once         07/15/24 0837    07/15/24 0835  budesonide-formoterol (SYMBICORT) 160-4.5 MCG/ACT inhaler 2 puff  2 Times Daily PRN         07/15/24 0836    07/15/24 0835  albuterol (PROVENTIL) nebulizer solution 0.083% 2.5 mg/3mL  Every 6 Hours PRN         07/15/24 0836    07/15/24 0816  Stress Test With Myocardial Perfusion One Day  Once,   Status:  Canceled         07/15/24 0816    07/15/24 0721  Cardiac Catheterization/Vascular Study  Once         07/15/24 0720    07/15/24 0600  CBC (No Diff)  Morning Draw         07/14/24 1047    07/15/24 0600  Basic Metabolic Panel  Morning Draw         07/14/24 1047    07/15/24 0445  heparin (porcine) 5000 UNIT/ML injection 2,000 Units  Once         07/15/24 0352    07/15/24 0300  Heparin Anti-Xa  Timed         07/14/24 2042    07/15/24 0001  NPO Diet NPO Type: Strict NPO  Diet Effective Midnight,   Status:  Canceled         07/14/24 1755    07/14/24 2130  heparin (porcine) 5000 UNIT/ML injection 4,000 Units   Once         07/14/24 2042    07/14/24 2100  atorvastatin (LIPITOR) tablet 40 mg  Nightly         07/14/24 1427    07/14/24 2100  metoprolol tartrate (LOPRESSOR) tablet 25 mg  Every 12 Hours Scheduled         07/14/24 1427    07/14/24 1900  Heparin Anti-Xa  Timed         07/14/24 1246    07/14/24 1515  aspirin EC tablet 81 mg  Daily         07/14/24 1427    07/14/24 1448  Diet: Cardiac; Healthy Heart (2-3 Na+); Fluid Consistency: Thin (IDDSI 0)  Diet Effective Now,   Status:  Canceled         07/14/24 1447    07/14/24 1431  Case Request Cath Lab: Coronary angiography  Once         07/14/24 1432    07/14/24 1139  Heparin Anti-Xa  STAT         07/14/24 1138    07/14/24 1139  D-dimer, Quantitative  STAT         07/14/24 1138    07/14/24 0831  NPO Diet NPO Type: Strict NPO  Diet Effective Now,   Status:  Canceled         07/14/24 0830    07/14/24 0600  CBC & Differential  Every Third Day,   Status:  Canceled      Comments: Discontinue After Heparin Stopped      07/13/24 1838    07/14/24 0600  CBC Auto Differential  PROCEDURE ONCE        Comments: Discontinue After Heparin Stopped      07/13/24 2202    07/14/24 0515  heparin 62637 units/250 mL (100 units/mL) in 0.45 % NaCl infusion  Titrated,   Status:  Discontinued         07/14/24 0434    07/14/24 0307  Scan Slide  Once,   Status:  Canceled        Comments: Discontinue After Heparin Stopped      07/14/24 0306    07/14/24 0300  Heparin Anti-Xa  Timed         07/13/24 2123    07/13/24 2100  sodium chloride 0.9 % flush 10 mL  Every 12 Hours Scheduled         07/13/24 1520    07/13/24 1930  heparin 48528 units/250 mL (100 units/mL) in 0.45 % NaCl infusion  Titrated,   Status:  Discontinued         07/13/24 1838    07/13/24 1840  Inpatient Cardiology Consult  Once        Specialty:  Cardiology  Provider:  Shelley Velasquez MD    07/13/24 1840    07/13/24 1839  Initiate & Follow Heparin Protocol  Continuous         07/13/24 1838    07/13/24 1839  Notify Provider  "Platelet Count Less Than 89740  Continuous        Comments: Open Order Report to View Parameters Requiring Provider Notification    07/13/24 1838    07/13/24 1839  Stop Infusion & Notify Provider if Bleeding Occurs  Continuous        Comments: Open Order Report to View Parameters Requiring Provider Notification    07/13/24 1838    07/13/24 1839  Heparin Anti-Xa  STAT        Comments: Prior to Initial Heparin Bolus      07/13/24 1838    07/13/24 1839  Protime-INR  STAT        Comments: Prior to Initial Heparin Bolus      07/13/24 1838    07/13/24 1839  aPTT  STAT        Comments: Prior to Initial Heparin Bolus      07/13/24 1838    07/13/24 1838  heparin (porcine) 5000 UNIT/ML injection 4,000 Units  As Needed         07/13/24 1838 07/13/24 1838  heparin (porcine) 5000 UNIT/ML injection 2,000 Units  As Needed         07/13/24 1838 07/13/24 1838  Pharmacy to Dose Heparin  Continuous PRN         07/13/24 1838    07/13/24 1821  Code Status and Medical Interventions:  Continuous         07/13/24 1820    07/13/24 1820  Stress Test With Myocardial Perfusion One Day  Once,   Status:  Canceled         07/13/24 1820    07/13/24 1800  Oral Care  2 Times Daily       07/13/24 1520    07/13/24 1739  High Sensitivity Troponin T 2Hr  PROCEDURE ONCE         07/13/24 1618    07/13/24 1720  High Sensitivity Troponin T  Once,   Status:  Canceled         07/13/24 1520    07/13/24 1700  Enoxaparin Sodium (LOVENOX) syringe 40 mg  Every 12 Hours,   Status:  Discontinued         07/13/24 1601    07/13/24 1600  Vital Signs  Every 4 Hours       07/13/24 1520    07/13/24 1521  Cardiac Monitoring  Continuous        Comments: Follow Standing Orders As Outlined in Process Instructions (Open Order Report to View Full Instructions)    07/13/24 1520    07/13/24 1520  sennosides-docusate (PERICOLACE) 8.6-50 MG per tablet 2 tablet  2 Times Daily PRN        Placed in \"And\" Linked Group    07/13/24 1520    07/13/24 1520  polyethylene glycol " "(MIRALAX) packet 17 g  Daily PRN        Placed in \"And\" Linked Group    07/13/24 1520    07/13/24 1520  bisacodyl (DULCOLAX) EC tablet 5 mg  Daily PRN        Placed in \"And\" Linked Group    07/13/24 1520    07/13/24 1520  bisacodyl (DULCOLAX) suppository 10 mg  Daily PRN        Placed in \"And\" Linked Group    07/13/24 1520    07/13/24 1520  High Sensitivity Troponin T  STAT         07/13/24 1520    07/13/24 1520  ECG 12 Lead Chest Pain  Once         07/13/24 1520    07/13/24 1520  Adult Transthoracic Echo Complete w/ Color, Spectral and Contrast if necessary per protocol  Once         07/13/24 1520    07/13/24 1519  Diet: Cardiac; Healthy Heart (2-3 Na+); Fluid Consistency: Thin (IDDSI 0)  Diet Effective Now,   Status:  Canceled         07/13/24 1520    07/13/24 1519  CBC (No Diff)  Once         07/13/24 1520    07/13/24 1519  Comprehensive Metabolic Panel  Once         07/13/24 1520    07/13/24 1518  Pharmacy to Dose enoxaparin (LOVENOX)  Continuous PRN,   Status:  Discontinued         07/13/24 1520    07/13/24 1518  Intake & Output  Every Shift       07/13/24 1520    07/13/24 1518  Weigh Patient  Once         07/13/24 1520    07/13/24 1518  Insert Peripheral IV  Once         07/13/24 1520    07/13/24 1518  Saline Lock & Maintain IV Access  Continuous         07/13/24 1520    07/13/24 1518  Initiate Observation Status  Once         07/13/24 1520    07/13/24 1517  sodium chloride 0.9 % flush 10 mL  As Needed         07/13/24 1520    07/13/24 1517  sodium chloride 0.9 % infusion 40 mL  As Needed         07/13/24 1520    07/13/24 1447  POC Glucose Once  PROCEDURE ONCE        Comments: Complete no more than 45 minutes prior to patient eating      07/13/24 1440    07/13/24 0000  Telemetry Scan  Once         07/13/24 0000    07/13/24 0000  Telemetry Scan  Once         07/13/24 0000    07/13/24 0000  Telemetry Scan  Once         07/13/24 0000    07/13/24 0000  Telemetry Scan  Once         07/13/24 0000    07/13/24 0000  " Telemetry Scan  Once         07/13/24 0000    07/13/24 0000  Telemetry Scan  Once         07/13/24 0000    07/13/24 0000  Telemetry Scan  Once         07/13/24 0000    07/13/24 0000  Telemetry Scan  Once         07/13/24 0000    Unscheduled  Up With Assistance  As Needed       07/13/24 1520    Unscheduled  Oxygen Therapy- High Flow Nasal Cannula; Titrate 6-12 LPM Per SpO2; 90 - 95%  Continuous PRN       07/15/24 0247    --  albuterol sulfate  (90 Base) MCG/ACT inhaler  Every 6 Hours PRN         07/14/24 1434    --  budesonide-formoterol (SYMBICORT) 160-4.5 MCG/ACT inhaler  2 Times Daily PRN         07/14/24 1435    --  cetirizine (zyrTEC) 10 MG tablet  Daily         07/14/24 1435    --  cholecalciferol (VITAMIN D3) 1.25 MG (71128 UT) capsule  Every 7 Days,   Status:  Discontinued         07/14/24 1435    --  furosemide (LASIX) 40 MG tablet  Daily         07/14/24 1435    --  metFORMIN (GLUCOPHAGE) 1000 MG tablet  2 Times Daily With Meals         07/14/24 1436                     Physician Progress Notes (all)        Gerardo Orozco DO at 07/15/24 1649                [facility]    Subjective     History:   Con Colón is a 49 y.o. male admitted on 7/13/2024 secondary to NSTEMI (non-ST elevated myocardial infarction)     Procedures:   7/15/24: Nuclear stress test    Myocardial perfusion imaging indicates a normal myocardial perfusion study with no evidence of ischemia.    Left ventricular ejection fraction is normal (Calculated EF = 61%).    TID 1.12.    Findings consistent with a normal ECG stress test.    CC: Follow up chest pain    Patient seen and examined with FEI Eli. Awake and alert. No further episodes of CP reported. No reported dyspnea or palpitations. No reported vomiting. No acute events overnight per RN.     History taken from: patient, chart, and RN.      Objective     Vital Signs  Temp:  [97.6 °F (36.4 °C)-98.7 °F (37.1 °C)] 97.6 °F (36.4  °C)  Heart Rate:  [53-70] 62  Resp:  [20] 20  BP: ()/(52-80) 135/79    Intake/Output Summary (Last 24 hours) at 7/15/2024 1650  Last data filed at 7/15/2024 1626  Gross per 24 hour   Intake 1630.82 ml   Output --   Net 1630.82 ml         Physical Exam:  General:    Awake, alert, in no acute distress, morbidly obese    Heart:      Normal S1 and S2. Regular rate and rhythm. No significant murmur, rubs or gallops appreciated.   Lungs:     Respirations regular, even and unlabored. Lungs clear to auscultation B/L. No wheezes, rales or rhonchi.   Abdomen:   Soft and nontender. No guarding, rebound tenderness or  organomegaly noted. Bowel sounds present x 4.   Extremities:  (+) bilateral lower extremity edema. Moves UE and LE equally B/L.     Results Review:    Results from last 7 days   Lab Units 07/15/24  0300 07/14/24  0235 07/13/24  1539   WBC 10*3/mm3 6.57 6.55 6.68   HEMOGLOBIN g/dL 15.7 15.4 15.4   PLATELETS 10*3/mm3 150 132* 122*     Results from last 7 days   Lab Units 07/15/24  0408 07/13/24  1539   SODIUM mmol/L 140 142   POTASSIUM mmol/L 4.3 4.2   CHLORIDE mmol/L 107 106   CO2 mmol/L 22.0 22.8   BUN mg/dL 19 19   CREATININE mg/dL 0.91 0.91   CALCIUM mg/dL 9.1 9.4   GLUCOSE mg/dL 111* 102*     Results from last 7 days   Lab Units 07/13/24  1539   BILIRUBIN mg/dL 1.2   ALK PHOS U/L 111   AST (SGOT) U/L 19   ALT (SGPT) U/L 17         Results from last 7 days   Lab Units 07/13/24  1845   INR  1.33*     Results from last 7 days   Lab Units 07/13/24  1729 07/13/24  1539   HSTROP T ng/L 27* 26*       Imaging Results (Last 24 Hours)       Procedure Component Value Units Date/Time    CT Upper Extremity Left Without Contrast [[id number]] Collected: 07/15/24 1602     Updated: 07/15/24 1607    Narrative:      PROCEDURE: CT UPPER EXTREMITY LEFT WO CONTRAST-     HISTORY: Concern for hematoma; I21.4-Non-ST elevation (NSTEMI)  myocardial infarction     COMPARISON: None .     PROCEDURE: Axial images were obtained through  the left forearm by  computed tomography. Sagittal and coronal reconstruction images were  performed. This study was performed with techniques to keep radiation  doses as low as reasonably achievable (ALARA). Individualized dose  reduction techniques using automated exposure control or adjustment of  mA and/or kV according to the patient size were employed.         FINDINGS: In the area of the anterior proximal forearm there is  subcutaneous inflammatory stranding which may be secondary to a  hematoma. This measures approximately 4.3 x 1.0 cm. There are no acute  osseous abnormalities. The musculature of the forearm appears  unremarkable.       Impression:      4.3 x 1.0 cm subcutaneous inflammatory stranding in the  proximal forearm which may be due to a hematoma.              This report was finalized on 7/15/2024 4:05 PM by Laura Silver M.D..                 Medications:  aspirin, 81 mg, Oral, Daily  atorvastatin, 40 mg, Oral, Nightly  cetirizine, 10 mg, Oral, Daily  furosemide, 40 mg, Oral, Daily  lisinopril, 40 mg, Oral, Daily  [Held by provider] metFORMIN, 1,000 mg, Oral, BID With Meals  metoprolol tartrate, 25 mg, Oral, Q12H  sodium chloride, 10 mL, Intravenous, Q12H      heparin, 9.7 Units/kg/hr (Dosing Weight), Last Rate: 9.7 Units/kg/hr (07/15/24 1626)  Pharmacy to Dose Heparin,             Assessment & Plan   Chest pain with NSTEMI (possibly type II): T wave changes on EKG. Troponin's mildly elevated with delta of 1. D-dimer negative. Echo reveals an EF of 66-70%, mild LVH and severe pulmonary HTN. S/P nuclear stress test today with no evidence of ischemia. Cont ASA, statin, beta blocker and ACEI. Currently on heparin gtt. Monitor on telemetry. Cardiology input appreciated.     Hx of PE: Currently holding Xarelto pending cardiac eval and in setting of development of LUE hematoma.     LUE hematoma: Developed later today. CT reveals 4.3 x 1.0 cm subcutaneous inflammatory stranding in the proximal  forearm. Cont heparin gtt for now and monitor.     Lower extremity edema: Echo as above. Cardiology has ordered venous duplex US. Cont Lasix.     Essential HTN: BP stable. Cont lisinopril, metoprolol and Lasix. Cont to monitor.     DM II, non-insulin dependent: Check HgbA1c. Hold metformin. Cont to monitor.     NELY: Cont CPAP.    Severe pulmonary HTN: Cont Lasix. Cont CPAP in the setting of NELY.     HLD: Cont statin.     Morbid obesity by BMI: Complicates all aspects of care.     DVT PPX: Heparin gtt    Disposition Possible D/C home tomorrow.     Gerardo Orozco DO  07/15/24  16:50 EDT     Electronically signed by Gerardo Orozco DO at 07/15/24 1700       Dwayne Espitia MD at 07/15/24 1406             LOS: 1 day     Name: Con Colón  Age/Sex: 49 y.o. male  :  1975        PCP: Provider, No Known    Principal Problem:    NSTEMI (non-ST elevated myocardial infarction)    Patient seen and examined.  Chart reviewed.  Patient with history of chronic pulm embolism on Xarelto since  or so.  Came in with complaints of chest pains and shortness of breath.  Echocardiogram shows severe pulm hypertension with RV systolic pressure more than 55 mmHg.  Previous echo from 2019 showed RV pressure of 32 mmHg.  Patient has shortness of breath and had new onset of chest pains.  Patient is compliant with his Xarelto.  He is morbidly obese and still using tobacco    On exam  Alert awake and oriented, sensorimotor stroma grossly intact  No wheezing or crackles  Regular rate rhythm, normal S1-S2  Abdomen distended nontender  No edema  Assessment  #1 cardiac.  Patient with chest pains negative stress test today with normal EF.  Patient noted to have severe pulmonary hypertension on echo this admission.  Patient had minimal elevated troponin of 26 and 27.   The pulmonary hypertension could be a manifestation of chronic pulm embolism.  Agree with anticoagulation to continue.  Vasodilator  medications, calcium channel blocker may be started for pulm hypertension    Since patient is on chronic anticoagulation May consider getting a cath to rule out any coronary disease as etiology of his pain.  Pain could have a pleuritic component also.    .Electronically signed by Dwayne Espitia MD, 07/15/24, 6:15 PM EDT.      Admission Information: Con Colón is a 49 y.o. male with diabetes mellitus, hypertension, hyperlipidemia, morbid obesity, and history of pulmonary embolism.    Chief Complaint: Chest pain    Interval history: Has remained chest pain-free    Subjective     Patient is awake and seated on the edge of his bed at the time of my visit.  He denies chest pain or shortness of breath at this time.  He reports that his last dose of Xarelto was 07/13/2024 at 1800.    Vital Signs  Vital Signs (last 72 hrs)         07/12 0700  07/13 0659 07/13 0700  07/14 0659 07/14 0700  07/15 0659 07/15 0700  07/15 1406   Most Recent      Temp (°F)   97.6 -  98.9    97.9 -  98.9      98.4     98.4 (36.9) 07/15 1035    Heart Rate   77 -  81    53 -  89      59     59 07/15 1035    Resp     20      20      20     20 07/15 1035    BP   118/69 -  143/89    98/52 -  148/85      115/76     115/76 07/15 1035    SpO2 (%)   86 -  97    90 -  99      98     98 07/15 1035    Flow (L/min)       2       2 07/15 0403          Temp:  [97.9 °F (36.6 °C)-98.7 °F (37.1 °C)] 98.4 °F (36.9 °C)  Heart Rate:  [53-89] 59  Resp:  [20] 20  BP: ()/(52-85) 115/76  Body mass index is 52.52 kg/m².      Intake/Output Summary (Last 24 hours) at 7/15/2024 1406  Last data filed at 7/15/2024 1317  Gross per 24 hour   Intake 1431.35 ml   Output --   Net 1431.35 ml       Vitals and nursing note reviewed.   Constitutional:       Appearance: Not in distress. Morbidly obese.   Eyes:      Conjunctiva/sclera: Conjunctivae normal.   Pulmonary:      Effort: Pulmonary effort is normal.      Breath sounds: Normal breath sounds.   Cardiovascular:      Normal  rate. Regular rhythm.      Murmurs: There is no murmur.   Edema:     Peripheral edema absent.   Skin:     General: Skin is warm and dry.   Neurological:      Mental Status: Alert.         Telemetry: Sinus rhythm 60s     Results Review:   Most Recent Echo Result    Adult Transthoracic Echo Complete w/ Color, Spectral and Contrast if necessary per protocol 7/14/2024 11:28 AM    Interpretation Summary    Left ventricular systolic function is normal. Calculated left ventricular EF = 69% Left ventricular ejection fraction appears to be 66 - 70%.    Left ventricular wall thickness is consistent with mild concentric hypertrophy.    The right ventricular cavity is moderate to severely dilated.    The left atrial cavity is mildly dilated.    Left atrial volume is mildly increased.    The right atrial cavity is severely  dilated.    Estimated right ventricular systolic pressure from tricuspid regurgitation is markedly elevated (>55 mmHg). Calculated right ventricular systolic pressure from tricuspid regurgitation is 55 mmHg.    The aortic root measures 3.7 cm.    Signed by: Shelley Velasquez MD on 7/14/2024 11:53 AM      Results from last 7 days   Lab Units 07/15/24  0300 07/14/24  0235 07/13/24  1539   WBC 10*3/mm3 6.57 6.55 6.68   HEMOGLOBIN g/dL 15.7 15.4 15.4   PLATELETS 10*3/mm3 150 132* 122*     Results from last 7 days   Lab Units 07/15/24  0408 07/13/24  1539   SODIUM mmol/L 140 142   POTASSIUM mmol/L 4.3 4.2   CHLORIDE mmol/L 107 106   CO2 mmol/L 22.0 22.8   BUN mg/dL 19 19   CREATININE mg/dL 0.91 0.91   CALCIUM mg/dL 9.1 9.4   GLUCOSE mg/dL 111* 102*     Results from last 7 days   Lab Units 07/13/24  1729 07/13/24  1539   HSTROP T ng/L 27* 26*       Results from last 7 days   Lab Units 07/13/24  1845   INR  1.33*       I reviewed the patient's new clinical results.  I reviewed the patient's new imaging results and agree with the interpretation.  I personally viewed and interpreted the patient's EKG/Telemetry  data      Medication Review:   aspirin, 81 mg, Oral, Daily  atorvastatin, 40 mg, Oral, Nightly  cetirizine, 10 mg, Oral, Daily  furosemide, 40 mg, Oral, Daily  lisinopril, 40 mg, Oral, Daily  [Held by provider] metFORMIN, 1,000 mg, Oral, BID With Meals  metoprolol tartrate, 25 mg, Oral, Q12H  sodium chloride, 10 mL, Intravenous, Q12H      heparin, 9.7 Units/kg/hr (Dosing Weight), Last Rate: 9.7 Units/kg/hr (07/15/24 1347)  Pharmacy to Dose Heparin,         Assessment:  NSTEMI type I versus type II  Hypertension  Hyperlipidemia  Severe pulmonary hypertension      Recommendations:  Stress test today.  If it indicates ischemia, we will plan for invasive coronary angiogram tomorrow as it would have been more than 48 hours since his last dose of Xarelto.  He continues on a heparin drip.  Also on aspirin, metoprolol, high intensity statin, and ACE inhibitor  Blood pressure at goal on current medications  Lipid panel ordered  With a negative D-dimer and reported compliance with Xarelto, probability of recurrent PE is low.  Will order bilateral venous ultrasound of the lower extremities    I discussed the patients findings and my recommendations with patient.    Case discussed with Dr. Espitia and plan of care reflects his recommendations.      Electronically signed by JACEK Canela, 07/15/24, 2:14 PM EDT.                Electronically signed by Dwayne Espitia MD at 07/15/24 1819       Uday Kaplan DO at 24 1044              [facility]     Patient Identification:  Name:  Con Colón  Age:  49 y.o.  Sex:  male  :  1975  MRN:  [id number]  Visit Number:  [id number]  Primary Care Provider:  Provider, No Known    Length of stay:  1    Chief complaint: Chest pain    Subjective:    Patient seen and examined at bedside with no nursing staff present.  Patient was sitting on the edge of his bed eating breakfast.  Patient denies any chest pain this morning.  No  new events overnight.  ----------------------------------------------------------------------------------------------------------------------  Current Hospital Meds:  sodium chloride, 10 mL, Intravenous, Q12H      heparin, 3.7 Units/kg/hr (Dosing Weight), Last Rate: 3.7 Units/kg/hr (07/14/24 0533)  Pharmacy to Dose Heparin,       ----------------------------------------------------------------------------------------------------------------------  Vital Signs:  Temp:  [97.6 °F (36.4 °C)-98.9 °F (37.2 °C)] 97.6 °F (36.4 °C)  Heart Rate:  [71-81] 77  Resp:  [20] 20  BP: (118-143)/(64-89) 118/69      07/13/24  1435 07/14/24  0500   Weight: (!) 173 kg (380 lb 9.6 oz) (!) 171 kg (377 lb 3.3 oz)     Body mass index is 52.61 kg/m².    Intake/Output Summary (Last 24 hours) at 7/14/2024 1044  Last data filed at 7/14/2024 0830  Gross per 24 hour   Intake 840 ml   Output --   Net 840 ml     NPO Diet NPO Type: Strict NPO  ----------------------------------------------------------------------------------------------------------------------  Physical exam:  Constitutional: Well-nourished  male in no apparent distress.     HENT:  Head:  Normocephalic and atraumatic.  Mouth:  Moist mucous membranes.    Eyes:  Conjunctivae and EOM are normal.  Pupils are equal, round, and reactive to light.  No scleral icterus.    Neck:  Neck supple. No thyromegaly.  No JVD present.    Cardiovascular:  Regular rate and rhythm with no murmurs, rubs, clicks or gallops appreciated.  Pulmonary/Chest:  Clear to auscultation bilaterally with no crackles, wheezes or rhonchi appreciated.  Abdominal:  Soft. Nontender. Nondistended  Bowel sounds are normal in all four quadrants. No organomegally appreciated.   Musculoskeletal:  5/5 muscle strength bilateral upper and lower extremities with equal muscle tone and bulk. No edema, no tenderness, and no deformity.  No red or swollen joints anywhere.    Neurological:  Alert, Cranial nerves II-XII intact  "with no focal deficits.  No facial droop.  No slurred speech.   Skin:  Warm and dry to palpation with no rashes or lesions appreciated.  Peripheral vascular:  2+ radial and pedal pulses in bilateral upper and lower extremities.  Psychiatric:  Alert and oriented x3, demonstrates appropriate judgment and insight.  ------------------------------------------------------------------   ----------------------------------------------------------------------------------------------------------------------  Results from last 7 days   Lab Units 07/13/24  1729 07/13/24  1539   HSTROP T ng/L 27* 26*     Results from last 7 days   Lab Units 07/14/24  0235 07/13/24  1845 07/13/24  1539   WBC 10*3/mm3 6.55  --  6.68   HEMOGLOBIN g/dL 15.4  --  15.4   HEMATOCRIT % 48.3  --  47.2   MCV fL 92.9  --  92.4   MCHC g/dL 31.9  --  32.6   PLATELETS 10*3/mm3 132*  --  122*   INR   --  1.33*  --          Results from last 7 days   Lab Units 07/13/24  1539   SODIUM mmol/L 142   POTASSIUM mmol/L 4.2   CHLORIDE mmol/L 106   CO2 mmol/L 22.8   BUN mg/dL 19   CREATININE mg/dL 0.91   CALCIUM mg/dL 9.4   GLUCOSE mg/dL 102*   ALBUMIN g/dL 3.8   BILIRUBIN mg/dL 1.2   ALK PHOS U/L 111   AST (SGOT) U/L 19   ALT (SGPT) U/L 17   Estimated Creatinine Clearance: 161.8 mL/min (by C-G formula based on SCr of 0.91 mg/dL).    No results found for: \"AMMONIA\"      No results found for: \"BLOODCX\"  No results found for: \"URINECX\"  No results found for: \"WOUNDCX\"  No results found for: \"STOOLCX\"    I have personally looked at the labs and they are summarized above.  ----------------------------------------------------------------------------------------------------------------------  Imaging Results (Last 24 Hours)       ** No results found for the last 24 hours. **          ----------------------------------------------------------------------------------------------------------------------  Assessment and Plan:    Chest pain -patient to have transthoracic echo " performed today, cardiology consulted to give further input into stress test versus proceeding with left heart catheterization.  Patient is now NPO.    2.  Essential hypertension -well-controlled    3.  History of PE -patient on Xarelto at home for history of PE, have discontinued and transition to heparin drip in anticipation of possible need for left heart catheterization during this hospital stay    4.  Morbid obesity -complicates all aspects of care    Disposition awaiting further cardiology recommendations    Uday Kaplan DO   07/14/24   10:44 EDT       Electronically signed by Uday Kaplan DO at 07/14/24 1047          Consult Notes (all)        Kirill Grover PA-C at 07/14/24 0918       Attestation signed by Shelley Velasquez MD at 07/14/24 1921    I have reviewed this documentation, presentation consistent with ACS. Has sign Pulmonary HTN. Plan Coronary angiogram in AM                  Consults  Date of Admit: 7/13/2024  Date of Consult: 07/14/24  Provider, No Known  Con Colón  1975  Consulting Physician: Dr. Velasquez    Cardiology consultation    Reason for consultation: chest pain  Assessment:  NSTEMI type I versus 2  History of PE, chronically anticoagulated with Xarelto normal D-dimer  Essential hypertension      Recommendations:  Given Mr. Colón's persistent anginal-like symptoms with EKG changes and minimally elevated troponin we did recommend proceeding with left heart catheterization in the morning.  We discussed the risk of the procedure including MI, CVA, arterial damage, and renal injury.  He expressed understanding with this and agreed to proceed.  Start aspirin 81 mg, Lipitor 40 mg, and Lopressor 25 mg      History of Present Illness    Subjective     No chief complaint on file.      Con Colón is a 49 y.o. male with past medical history significant for history of PE chronically anticoagulated with Xarelto, diabetes mellitus, essential hypertension,  dyslipidemia.  Con presents to [facility] emergency department yesterday after persistent chest pains that developed that he describes as heaviness in his chest like a weighted sitting on the center of his chest.  He denies any radiation of this pain but did report increasing shortness of breath.  He reports she has had a few episodes over the last several years however this recent episode was more severe.  He reports that the pain persisted until he arrived in the ER where he received nitroglycerin.  He has been chest pain-free since admission.          Past Medical History:   Diagnosis Date    Cardiomegaly     COPD (chronic obstructive pulmonary disease)     Hypertension     Pulmonary embolism     PAST    Sleep apnea      History reviewed. No pertinent surgical history.  Family History   Problem Relation Age of Onset    Diabetes Other      Social History     Tobacco Use    Smoking status: Former    Smokeless tobacco: Current     Types: Chew    Tobacco comments:     2019 QUIT   Vaping Use    Vaping status: Never Used    Passive vaping exposure: Yes   Substance Use Topics    Alcohol use: Never    Drug use: Never     Medications Prior to Admission   Medication Sig Dispense Refill Last Dose    furosemide (LASIX) 40 MG tablet Take 1 tablet by mouth Daily. 90 tablet 1     furosemide (LASIX) 40 MG tablet Take 1 tablet by mouth 2 (Two) Times a Day. 180 tablet 3     glucose blood test strip Use 4 times daily as needed to check glucose 100 each 0     lisinopril (PRINIVIL,ZESTRIL) 40 MG tablet TAKE 1 TABLET BY MOUTH DAILY 90 tablet 0     metFORMIN (GLUCOPHAGE) 1000 MG tablet TAKE 1 TABLET BY MOUTH TWICE DAILY WITH MEALS 180 tablet 0     multivitamin with minerals tablet tablet Take 1 tablet by mouth Daily.       Xarelto 20 MG tablet TAKE 1 TABLET BY MOUTH DAILY 90 tablet 0      Allergies:  Nuvigil [armodafinil] and Provigil [modafinil]      Current Facility-Administered Medications:     sennosides-docusate  (PERICOLACE) 8.6-50 MG per tablet 2 tablet, 2 tablet, Oral, BID PRN **AND** polyethylene glycol (MIRALAX) packet 17 g, 17 g, Oral, Daily PRN **AND** bisacodyl (DULCOLAX) EC tablet 5 mg, 5 mg, Oral, Daily PRN **AND** bisacodyl (DULCOLAX) suppository 10 mg, 10 mg, Rectal, Daily PRN, Uday Kaplan,     heparin (porcine) 5000 UNIT/ML injection 2,000 Units, 2,000 Units, Intravenous, PRN, Uday Kaplan, DO    heparin (porcine) 5000 UNIT/ML injection 4,000 Units, 4,000 Units, Intravenous, PRN, Uday Kaplan,     heparin 89480 units/250 mL (100 units/mL) in 0.45 % NaCl infusion, 3.7 Units/kg/hr (Dosing Weight), Intravenous, Titrated, Uday Kaplan, DO, Last Rate: 6.4 mL/hr at 07/14/24 0533, 3.7 Units/kg/hr at 07/14/24 0533    Pharmacy to Dose Heparin, , Does not apply, Continuous PRN, Uday Kaplan,     sodium chloride 0.9 % flush 10 mL, 10 mL, Intravenous, Q12H, Uday Kaplan,     sodium chloride 0.9 % flush 10 mL, 10 mL, Intravenous, PRN, Uday Kaplan,     sodium chloride 0.9 % infusion 40 mL, 40 mL, Intravenous, PRN, Uday Kaplan,     Review of Systems   Constitutional:  Negative for chills and diaphoresis.   HENT:  Negative for congestion and nosebleeds.    Respiratory:  Positive for chest tightness and shortness of breath.    Cardiovascular:  Positive for chest pain. Negative for palpitations.   Gastrointestinal:  Negative for abdominal pain and constipation.   Genitourinary:  Negative for dysuria and hematuria.   Musculoskeletal:  Negative for arthralgias and back pain.   Skin:  Negative for color change and pallor.   Neurological:  Negative for dizziness and seizures.   Psychiatric/Behavioral:  Negative for agitation and confusion.          Objective      Vital Signs  Temp:  [97.6 °F (36.4 °C)-98.9 °F (37.2 °C)] 97.6 °F (36.4 °C)  Heart Rate:  [71-81] 77  Resp:  [20] 20  BP: (118-143)/(64-89) 118/69  Body mass index is 52.61  kg/m².    Intake/Output Summary (Last 24 hours) at 7/14/2024 0918  Last data filed at 7/13/2024 2000  Gross per 24 hour   Intake 600 ml   Output --   Net 600 ml       Physical Exam  Constitutional:       Appearance: Normal appearance.   HENT:      Head: Normocephalic and atraumatic.      Mouth/Throat:      Mouth: Mucous membranes are moist.   Eyes:      General:         Right eye: No discharge.         Left eye: No discharge.      Pupils: Pupils are equal, round, and reactive to light.   Cardiovascular:      Rate and Rhythm: Normal rate and regular rhythm.   Pulmonary:      Effort: Pulmonary effort is normal.      Breath sounds: Normal breath sounds.   Abdominal:      General: Abdomen is flat.      Palpations: Abdomen is soft.   Skin:     General: Skin is warm and dry.   Neurological:      General: No focal deficit present.      Mental Status: He is alert and oriented to person, place, and time.           Results Review:   I reviewed the patient's new clinical results.  Results from last 7 days   Lab Units 07/13/24  1729 07/13/24  1539   HSTROP T ng/L 27* 26*     Results from last 7 days   Lab Units 07/14/24  0235 07/13/24  1539   WBC 10*3/mm3 6.55 6.68   HEMOGLOBIN g/dL 15.4 15.4   PLATELETS 10*3/mm3 132* 122*     Results from last 7 days   Lab Units 07/13/24  1539   SODIUM mmol/L 142   POTASSIUM mmol/L 4.2   CHLORIDE mmol/L 106   CO2 mmol/L 22.8   BUN mg/dL 19   CREATININE mg/dL 0.91   CALCIUM mg/dL 9.4   GLUCOSE mg/dL 102*   ALT (SGPT) U/L 17   AST (SGOT) U/L 19     Lab Results   Component Value Date    INR 1.33 (H) 07/13/2024    INR 1.02 (L) 11/16/2020    INR 1.37 (L) 01/16/2020    INR 1.21 (L) 12/13/2019    INR 1.21 (L) 12/12/2019    INR 1.21 (L) 12/10/2019    INR 1.10 (L) 12/10/2019     Lab Results   Component Value Date    MG 1.92 12/13/2019    MG 1.95 12/12/2019     Lab Results   Component Value Date    TSH 1.630 01/26/2022    TRIG 49 01/26/2022    HDL 32 (L) 01/26/2022    LDL 77 01/26/2022      Lab Results    Component Value Date    BNP 1152 (H) 01/13/2021    BNP 1325 (H) 11/15/2020    BNP 4479 (H) 12/10/2019       EKG:     Imaging Results (Last 72 Hours)       ** No results found for the last 72 hours. **             Thank you very much for asking us to be involved in this patient's care.  We will follow along with you.    Kirill Grover PA-C   07/14/24  09:18 EDT        Electronically signed by Shelley Velasquez MD at 07/14/24 8910

## 2024-07-16 NOTE — NURSING NOTE
Received phone call from telemetry that pt oxygen dropping to 82-86%, upon assessing pt he stated this is my normal.

## 2024-07-16 NOTE — PLAN OF CARE
Patient care transferred from Tala RN this shift. Agree with previous nursing assessment. Patient to be DC on this date. Patient waiting for transportation home.

## 2024-07-16 NOTE — NURSING NOTE
Pt being discharged home today on room air. FEI Edgar made aware the discharge is complete. Neighbor to transport home.       Update: 1220  Per FEI Edgar, the patient stated his ride will be here at around 1400.

## 2024-07-16 NOTE — PLAN OF CARE
Goal Outcome Evaluation:  Plan of Care Reviewed With: patient        Progress: no change  Outcome Evaluation: Pt NPO depending heart cath. Pt unsure if he will undergo procedure. Call light within reach, safety measures in place.

## 2024-07-16 NOTE — NURSING NOTE
Took over care from Sugey ENAMORADO at this time. Patient resting in bed no distress noted at this time. Heparin gtt infusing per order. He remains NPO for possible heart cath.  Plan of care reviewed with the patient.

## 2024-07-16 NOTE — PROGRESS NOTES
LOS: 2 days     Name: Con Colón  Age/Sex: 49 y.o. male  :  1975        PCP: Provider, No Known    Patient seen and examined.  Chart reviewed.  Agree with nurse practitioners charting and assessment and plan.  Patient was brought to the Cath Lab in the morning for cardiac catheterization.  Patient could not lay flat on the table.  Previously patient had negative stress test yesterday.  Due to his history of pulmonary embolism he is on Xarelto long-term.  Discussed care with patient.  May consider getting a CTA coronaries done as an outpatient.  Will continue antianginal medications for on discharge.  Follow-up as an outpatient in 3 to 4 weeks postdischarge.    .Electronically signed by Dwayne Espitia MD, 24, 5:23 PM EDT.      Principal Problem:    NSTEMI (non-ST elevated myocardial infarction)  Active Problems:    Chest pain    Heart failure, unspecified    Type 2 myocardial infarction    Chronic heart failure with preserved ejection fraction (HFpEF)        Admission Information: Con Colón is a 49 y.o. male with  diabetes mellitus, hypertension, hyperlipidemia, morbid obesity, and history of pulmonary embolism.     Chief Complaint: Chest pain    Interval history: Stress test returned with no signs of ischemia.  Due to symptoms and risk factors there is concern that this is a false negative and invasive coronary angiogram is indicated.    Subjective     Patient is awake and seated on the edge of his bed.  He denies chest pain or shortness of breath at this time.    Vital Signs  Vital Signs (last 72 hrs)          0700   0659  0700  15 0659 07/15 0700   0659  0700   1306   Most Recent      Temp (°F) 97.6 -  98.9    97.9 -  98.9    97.4 -  98.4      97.7     97.7 (36.5)  1042    Heart Rate 77 -  81    53 -  89    54 -  84      70     70  1042    Resp   20      20      20      20     20  1042    /69 -  143/89    98/52 -  148/85    104/68 -   135/79      101/62     101/62 07/16 1042    SpO2 (%) 86 -  97    90 -  99    90 -  98      92     92 07/16 1042    Flow (L/min)     2         2 07/15 0403          Temp:  [97.4 °F (36.3 °C)-97.8 °F (36.6 °C)] 97.7 °F (36.5 °C)  Heart Rate:  [54-84] 70  Resp:  [20] 20  BP: (101-135)/(62-80) 101/62  Body mass index is 52.98 kg/m².      Intake/Output Summary (Last 24 hours) at 7/16/2024 1306  Last data filed at 7/16/2024 1200  Gross per 24 hour   Intake 1339.47 ml   Output --   Net 1339.47 ml       Vitals and nursing note reviewed.   Constitutional:       Appearance: Not in distress. Morbidly obese.   Eyes:      Conjunctiva/sclera: Conjunctivae normal.   Pulmonary:      Effort: Pulmonary effort is normal.      Breath sounds: Normal breath sounds.   Cardiovascular:      Bradycardia present. Regular rhythm.      Murmurs: There is no murmur.   Edema:     Peripheral edema absent.   Skin:     General: Skin is warm and dry.   Neurological:      Mental Status: Alert.         Telemetry: Sinus bradycardia     Results Review:     Results from last 7 days   Lab Units 07/16/24  0348 07/15/24  0300 07/14/24  0235 07/13/24  1539   WBC 10*3/mm3 7.68 6.57 6.55 6.68   HEMOGLOBIN g/dL 16.0 15.7 15.4 15.4   PLATELETS 10*3/mm3 142 150 132* 122*     Results from last 7 days   Lab Units 07/16/24  0348 07/15/24  0408 07/13/24  1539   SODIUM mmol/L 139 140 142   POTASSIUM mmol/L 4.3 4.3 4.2   CHLORIDE mmol/L 107 107 106   CO2 mmol/L 20.8* 22.0 22.8   BUN mg/dL 21* 19 19   CREATININE mg/dL 1.00 0.91 0.91   CALCIUM mg/dL 9.1 9.1 9.4   GLUCOSE mg/dL 126* 111* 102*     Results from last 7 days   Lab Units 07/13/24  1729 07/13/24  1539   HSTROP T ng/L 27* 26*       Results from last 7 days   Lab Units 07/13/24  1845   INR  1.33*       I reviewed the patient's new clinical results.  I reviewed the patient's new imaging results and agree with the interpretation.  I personally viewed and interpreted the patient's EKG/Telemetry data      Medication  Review:   [Transfer Hold] aspirin, 81 mg, Oral, Daily  [Transfer Hold] atorvastatin, 40 mg, Oral, Nightly  [Transfer Hold] cetirizine, 10 mg, Oral, Daily  [Transfer Hold] furosemide, 40 mg, Oral, Daily  lisinopril, 40 mg, Oral, Daily  [Held by provider] metFORMIN, 1,000 mg, Oral, BID With Meals  metoprolol tartrate, 25 mg, Oral, Q12H  [Transfer Hold] sodium chloride, 10 mL, Intravenous, Q12H      heparin, 11.7 Units/kg/hr (Dosing Weight), Last Rate: Stopped (07/16/24 1056)  Pharmacy to Dose Heparin,         Assessment:  NSTEMI type I versus type II  Hypertension  Hyperlipidemia  Severe pulmonary hypertension      Recommendations:  Had initially plan for invasive coronary angiogram.  Patient verbalized understanding of the risks and benefits and agreed.  However, upon arrival to the Cath Lab he reported that he was unable to lie flat and so the procedure was aborted.  Should follow-up in the outpatient setting.  As he has been chest pain-free for more than 48 hours, he is stable for discharge from a cardiology standpoint.  Stable  Lipid panel not performed-unclear why  Will follow-up in the outpatient setting.  May need referral to 's pulmonary hypertension clinic    I discussed the patients findings and my recommendations with patient.        Electronically signed by JACEK Canela, 07/16/24, 1:11 PM EDT.

## 2024-07-16 NOTE — PLAN OF CARE
Goal Outcome Evaluation:    Pt is being discharged home today. Home medications given to pt at this time that were returned from the pharmacy. IV removed. Telemetry removed. Pt awaiting transport at this time.

## 2024-07-17 NOTE — PAYOR COMM NOTE
"CONTACT:  CATIE MENDOZA RN  UTILIZATION MANAGEMENT DEPT.   [facility]   [address] [facility], [address]   PHONE:  [phone]   FAX: [phone]       CO HOME 7/16/24    REF #[id number]        Basia Ye (49 y.o. Male)       Date of Birth   1975    Social Security Number       Address   [address] [facility] [address]    Home Phone   [phone]    MRN   [id number]       Worship   M[employer]onism    Marital Status                               Admission Date   7/13/24    Admission Type   Urgent    Admitting Provider   Uday Kaplan DO    Attending Provider       Department, Room/Bed   [facility], 3303/2S       Discharge Date   7/16/2024    Discharge Disposition   Home or Self Care    Discharge Destination   Home                              Attending Provider: (none)   Allergies: Nuvigil [Armodafinil], Provigil [Modafinil]    Isolation: None   Infection: None   Code Status: Prior    Ht: 180.3 cm (71\")   Wt: 172 kg (379 lb 13.6 oz)    Admission Cmt: None   Principal Problem: NSTEMI (non-ST elevated myocardial infarction) [I21.4]                   Active Insurance as of 7/13/2024       Primary Coverage       Payor Plan Insurance Group Employer/Plan Group    [facility] COMMUNITY PLAN [facility] COMMUNITY PLAN [facility]       Payor Plan Address Payor Plan Phone Number Payor Plan Fax Number Effective Dates    [address]   1/1/2021 - None Entered    [facility] [address]         Subscriber Name Subscriber Birth Date Member ID       BASIA YE 1975 [id number]                     Emergency Contacts        (Rel.) Home Phone Work Phone Mobile Phone    BENJIE EL (Friend) -- -- [phone]              Discharge Summary    No notes of this type exist for this encounter.       Discharge Order (From admission, onward)       Start     Ordered    07/16/24 1125  Discharge patient  Once        Expected Discharge Date: 07/16/24 "   Discharge Disposition: Home or Self Care   Physician of Record for Attribution - Please select from Treatment Team: SHANE BABIN [[id number]]   Review needed by CMO to determine Physician of Record: No      Question Answer Comment   Physician of Record for Attribution - Please select from Treatment Team SHANE BABIN    Review needed by CMO to determine Physician of Record No        07/16/24 1127

## 2024-07-18 ENCOUNTER — TELEPHONE (OUTPATIENT)
Dept: CARDIOLOGY | Facility: CLINIC | Age: 49
End: 2024-07-18

## 2024-07-18 ENCOUNTER — APPOINTMENT (OUTPATIENT)
Dept: CT IMAGING | Facility: HOSPITAL | Age: 49
End: 2024-07-18
Payer: MEDICAID

## 2024-07-18 ENCOUNTER — APPOINTMENT (OUTPATIENT)
Dept: ULTRASOUND IMAGING | Facility: HOSPITAL | Age: 49
End: 2024-07-18
Payer: MEDICAID

## 2024-07-18 ENCOUNTER — HOSPITAL ENCOUNTER (EMERGENCY)
Facility: HOSPITAL | Age: 49
Discharge: HOME | End: 2024-07-18
Attending: EMERGENCY MEDICINE
Payer: MEDICAID

## 2024-07-18 ENCOUNTER — APPOINTMENT (OUTPATIENT)
Dept: GENERAL RADIOLOGY | Facility: HOSPITAL | Age: 49
End: 2024-07-18
Payer: MEDICAID

## 2024-07-18 VITALS
OXYGEN SATURATION: 92 % | RESPIRATION RATE: 20 BRPM | DIASTOLIC BLOOD PRESSURE: 83 MMHG | BODY MASS INDEX: 44.1 KG/M2 | SYSTOLIC BLOOD PRESSURE: 114 MMHG | TEMPERATURE: 97.5 F | HEIGHT: 71 IN | WEIGHT: 315 LBS | HEART RATE: 62 BPM

## 2024-07-18 DIAGNOSIS — R07.9 CHEST PAIN, UNSPECIFIED TYPE: Primary | ICD-10-CM

## 2024-07-18 LAB
ALBUMIN SERPL-MCNC: 4 G/DL (ref 3.5–5.2)
ALBUMIN/GLOB SERPL: 1 G/DL
ALP SERPL-CCNC: 121 U/L (ref 39–117)
ALT SERPL W P-5'-P-CCNC: 17 U/L (ref 1–41)
ANION GAP SERPL CALCULATED.3IONS-SCNC: 10.5 MMOL/L (ref 5–15)
AST SERPL-CCNC: 19 U/L (ref 1–40)
BASOPHILS # BLD AUTO: 0.06 10*3/MM3 (ref 0–0.2)
BASOPHILS NFR BLD AUTO: 0.7 % (ref 0–1.5)
BILIRUB SERPL-MCNC: 1.3 MG/DL (ref 0–1.2)
BUN SERPL-MCNC: 19 MG/DL (ref 6–20)
BUN/CREAT SERPL: 15.1 (ref 7–25)
CALCIUM SPEC-SCNC: 9.5 MG/DL (ref 8.6–10.5)
CHLORIDE SERPL-SCNC: 104 MMOL/L (ref 98–107)
CO2 SERPL-SCNC: 25.5 MMOL/L (ref 22–29)
CREAT SERPL-MCNC: 1.26 MG/DL (ref 0.76–1.27)
CRP SERPL-MCNC: 0.45 MG/DL (ref 0–0.5)
DEPRECATED RDW RBC AUTO: 53.1 FL (ref 37–54)
EGFRCR SERPLBLD CKD-EPI 2021: 70.8 ML/MIN/1.73
EOSINOPHIL # BLD AUTO: 0.21 10*3/MM3 (ref 0–0.4)
EOSINOPHIL NFR BLD AUTO: 2.5 % (ref 0.3–6.2)
ERYTHROCYTE [DISTWIDTH] IN BLOOD BY AUTOMATED COUNT: 15.6 % (ref 12.3–15.4)
GEN 5 2HR TROPONIN T REFLEX: 30 NG/L
GLOBULIN UR ELPH-MCNC: 4 GM/DL
GLUCOSE SERPL-MCNC: 186 MG/DL (ref 65–99)
HCT VFR BLD AUTO: 50.6 % (ref 37.5–51)
HGB BLD-MCNC: 16.3 G/DL (ref 13–17.7)
HOLD SPECIMEN: NORMAL
HOLD SPECIMEN: NORMAL
IMM GRANULOCYTES # BLD AUTO: 0.05 10*3/MM3 (ref 0–0.05)
IMM GRANULOCYTES NFR BLD AUTO: 0.6 % (ref 0–0.5)
LYMPHOCYTES # BLD AUTO: 1.66 10*3/MM3 (ref 0.7–3.1)
LYMPHOCYTES NFR BLD AUTO: 19.8 % (ref 19.6–45.3)
MCH RBC QN AUTO: 30 PG (ref 26.6–33)
MCHC RBC AUTO-ENTMCNC: 32.2 G/DL (ref 31.5–35.7)
MCV RBC AUTO: 93 FL (ref 79–97)
MONOCYTES # BLD AUTO: 0.69 10*3/MM3 (ref 0.1–0.9)
MONOCYTES NFR BLD AUTO: 8.2 % (ref 5–12)
NEUTROPHILS NFR BLD AUTO: 5.71 10*3/MM3 (ref 1.7–7)
NEUTROPHILS NFR BLD AUTO: 68.2 % (ref 42.7–76)
NRBC BLD AUTO-RTO: 0 /100 WBC (ref 0–0.2)
PLATELET # BLD AUTO: 147 10*3/MM3 (ref 140–450)
PMV BLD AUTO: 10.6 FL (ref 6–12)
POTASSIUM SERPL-SCNC: 3.9 MMOL/L (ref 3.5–5.2)
PROT SERPL-MCNC: 8 G/DL (ref 6–8.5)
QT INTERVAL: 444 MS
QTC INTERVAL: 465 MS
RBC # BLD AUTO: 5.44 10*6/MM3 (ref 4.14–5.8)
SODIUM SERPL-SCNC: 140 MMOL/L (ref 136–145)
TROPONIN T DELTA: -2 NG/L
TROPONIN T SERPL HS-MCNC: 32 NG/L
TROPONIN T SERPL HS-MCNC: 32 NG/L
WBC NRBC COR # BLD AUTO: 8.38 10*3/MM3 (ref 3.4–10.8)
WHOLE BLOOD HOLD COAG: NORMAL
WHOLE BLOOD HOLD SPECIMEN: NORMAL

## 2024-07-18 PROCEDURE — 36415 COLL VENOUS BLD VENIPUNCTURE: CPT

## 2024-07-18 PROCEDURE — 86140 C-REACTIVE PROTEIN: CPT | Performed by: NURSE PRACTITIONER

## 2024-07-18 PROCEDURE — 85025 COMPLETE CBC W/AUTO DIFF WBC: CPT | Performed by: NURSE PRACTITIONER

## 2024-07-18 PROCEDURE — 93971 EXTREMITY STUDY: CPT | Performed by: RADIOLOGY

## 2024-07-18 PROCEDURE — 75574 CT ANGIO HRT W/3D IMAGE: CPT | Performed by: RADIOLOGY

## 2024-07-18 PROCEDURE — 71045 X-RAY EXAM CHEST 1 VIEW: CPT | Performed by: RADIOLOGY

## 2024-07-18 PROCEDURE — 93005 ELECTROCARDIOGRAM TRACING: CPT | Performed by: NURSE PRACTITIONER

## 2024-07-18 PROCEDURE — 75574 CT ANGIO HRT W/3D IMAGE: CPT

## 2024-07-18 PROCEDURE — 71045 X-RAY EXAM CHEST 1 VIEW: CPT

## 2024-07-18 PROCEDURE — 84484 ASSAY OF TROPONIN QUANT: CPT | Performed by: NURSE PRACTITIONER

## 2024-07-18 PROCEDURE — 93971 EXTREMITY STUDY: CPT

## 2024-07-18 PROCEDURE — 25510000001 IOPAMIDOL PER 1 ML: Performed by: EMERGENCY MEDICINE

## 2024-07-18 PROCEDURE — 80053 COMPREHEN METABOLIC PANEL: CPT | Performed by: NURSE PRACTITIONER

## 2024-07-18 PROCEDURE — 99285 EMERGENCY DEPT VISIT HI MDM: CPT

## 2024-07-18 PROCEDURE — 25810000003 SODIUM CHLORIDE 0.9 % SOLUTION: Performed by: NURSE PRACTITIONER

## 2024-07-18 RX ADMIN — IOPAMIDOL 85 ML: 755 INJECTION, SOLUTION INTRAVENOUS at 10:00

## 2024-07-18 RX ADMIN — SODIUM CHLORIDE 1000 ML: 9 INJECTION, SOLUTION INTRAVENOUS at 06:23

## 2024-07-18 NOTE — CASE MANAGEMENT/SOCIAL WORK
Discharge Planning Assessment   [place]     Patient Name: Con Colón  MRN: [id number]  Today's Date: 7/18/2024    Admit Date: 7/18/2024    Plan: CM spoke with patient at bedside. Patient lives at home alone in [facility] and plans to drive himself home at discharge. He does not have HH. Patient has a cpap that he purchased from [facility]. His PCP is JACEK Watson and he gets his prescriptions filled at [facility] in [place], [place]. Patient does not have a Living Will or POA. He has [facility] Community Plan [facility].   Discharge Needs Assessment       Row Name 07/18/24 1035       Living Environment    People in Home alone    Current Living Arrangements home    Potentially Unsafe Housing Conditions none    In the past 12 months has the electric, gas, oil, or water company threatened to shut off services in your home? No    Primary Care Provided by self    Provides Primary Care For no one    Family Caregiver if Needed friend(s)    Family Caregiver Names Carlos Makenna [phone]    Quality of Family Relationships unable to assess    Able to Return to Prior Arrangements yes       Resource/Environmental Concerns    Resource/Environmental Concerns none    Transportation Concerns none       Transportation Needs    In the past 12 months, has lack of transportation kept you from medical appointments or from getting medications? no    In the past 12 months, has lack of transportation kept you from meetings, work, or from getting things needed for daily living? No       Food Insecurity    Within the past 12 months, you worried that your food would run out before you got the money to buy more. Never true    Within the past 12 months, the food you bought just didn't last and you didn't have money to get more. Never true       Transition Planning    Patient/Family Anticipates Transition to home    Patient/Family Anticipated Services at Transition none    Transportation Anticipated family or friend will provide       Discharge  Needs Assessment    Readmission Within the Last 30 Days other (see comments)  thinks he may have had an allergic reaction to new meds and only took it yesterday    Equipment Currently Used at Home cpap    Concerns to be Addressed no discharge needs identified    Anticipated Changes Related to Illness none              Discharge Plan       Row Name 07/18/24 1111       Plan    Plan CM spoke with patient at bedside. Patient lives at home alone in [facility] and plans to drive himself home at discharge. He does not have HH. Patient has a cpap that he purchased from [facility]. His PCP is JACEK Watson and he gets his prescriptions filled at [facility] in [place], [place]. Patient does not have a Living Will or POA. He has [facility] Community Plan [facility].    Patient/Family in Agreement with Plan yes              Demographic Summary       Row Name 07/18/24 1034       General Information    Admission Type inpatient    Arrived From emergency department    Referral Source emergency department    Preferred Language English           Alana Hudson, RN     No

## 2024-07-18 NOTE — CASE MANAGEMENT/SOCIAL WORK
Case Management Readmission Assessment Note    Case Management Readmission Assessment (all recorded)       Readmission Interview       Row Name 07/18/24 1039             Readmission Indications    Is the patient and/or family able to complete the readmission assessment questions? Yes      Is this hospitalization related to the prior hospital diagnosis? Yes        Row Name 07/18/24 1039             Recommendation for rehospitalization    Did you speak with your physician prior to coming to the hospital No        Row Name 07/18/24 1039             Follow-up Appointments    Do you have a PCP? Yes      Did you have an appointment with PCP after your hospitalization? Yes      When was your appointment scheduled? 07/23/24      Did you go to appointment? No      Did you have an appointment with a Specialist? Yes      When was your appointment scheduled? 07/24/24      Did you go to appointment? No      Are you current with the Pulmonary Clinic? No      Are you current with the CHF Clinic? No        Row Name 07/18/24 1039             Medications    Did you have newly prescribed medications at discharge? Yes      Did you understand the reasons for your medications at discharge and how to take them? Yes      Did you understand the side effects of your medications? Yes      Are you taking all of you prescribed medications? Yes      What pharmacy was used to fill prescription(s)? [place], [place]      Were medications picked up? Yes        Row Name 07/18/24 1039             Discharge Instructions    Did you understand your discharge instructions? Yes      Did your family/caregiver hear your instructions? No      Were you told to eat a special diet? Yes      Did you adhere to the diet? No      Were you given a number of someone to call if you had questions or concerns? Yes        Row Name 07/18/24 1039             Index discharge location/services    Where did you go upon discharge? Home      Do you have supportive family  or friends in the home? Yes      What services were arranged at discharge? --  n/a        Row Name 07/18/24 1039             Discharge Readiness    On a scale of 1-5 (5 being well prepared), how ready were you for discharge 4      Recommendation based on interview Education on diagnosis/self management        Row Name 07/18/24 1039             Palliative Care/Hospice    Are you current with Palliative Care? No      Are you current with Hospice Care? No        Row Name 07/18/24 1039             Advance Directives (For Healthcare)    Pre-existing AND/MOST/POLST Order No      Have you reviewed your Advance Directive and is it valid for this stay? No      Literature Provided on Advance Directives No  patient did not want information      Patient Requests Assistance on Advance Directives Patient Declined        Row Name 07/18/24 1039             Readmission Assessment Final Comments    Final Comments Patient was given new meds at discharge and had only taken them 1 time and thought he had an allergic reaction.

## 2024-07-18 NOTE — ED PROVIDER NOTES
Subjective   History of Present Illness  Patient is a 49-year-old male with past medical history significant for COPD, hypertension, history of PE on Xarelto, sleep apnea, morbid obesity and diabetes.  He presents to the ED today with complaints of midsternal chest pain.  Reports mild shortness of breath with this.  He describes the pain as tightness and sharp.  He states that he was at work when this started about an hour ago prior to arrival.  It was recently admitted and discharged on 7/16/2024 cardiac workup.  Was noted to have a normal stress test but there was concern that this could be a false negative due to his history and risk factors.  A heart cath was attempted but patient was unable to tolerate.  Cardiology recommended an outpatient CT coronary angiogram for further evaluation.        Review of Systems   Constitutional: Negative.  Negative for fever.   HENT: Negative.     Eyes: Negative.    Respiratory:  Positive for shortness of breath.    Cardiovascular:  Positive for chest pain.   Gastrointestinal: Negative.  Negative for abdominal pain.   Endocrine: Negative.    Genitourinary: Negative.  Negative for dysuria.   Musculoskeletal: Negative.    Skin: Negative.    Allergic/Immunologic: Negative.    Neurological: Negative.    Hematological: Negative.    Psychiatric/Behavioral: Negative.     All other systems reviewed and are negative.      Past Medical History:   Diagnosis Date    Cardiomegaly     COPD (chronic obstructive pulmonary disease)     Hypertension     Pulmonary embolism     PAST    Sleep apnea        Allergies   Allergen Reactions    Nuvigil [Armodafinil] Palpitations    Provigil [Modafinil] Palpitations       No past surgical history on file.    Family History   Problem Relation Age of Onset    Diabetes Other        Social History     Socioeconomic History    Marital status:    Tobacco Use    Smoking status: Former    Smokeless tobacco: Current     Types: Chew    Tobacco comments:      2019 QUIT   Vaping Use    Vaping status: Never Used    Passive vaping exposure: Yes   Substance and Sexual Activity    Alcohol use: Never    Drug use: Never    Sexual activity: Defer           Objective   Physical Exam  Vitals and nursing note reviewed.   Constitutional:       General: He is not in acute distress.     Appearance: He is well-developed. He is obese. He is not diaphoretic.   HENT:      Head: Normocephalic and atraumatic.      Right Ear: External ear normal.      Left Ear: External ear normal.      Nose: Nose normal.   Eyes:      Conjunctiva/sclera: Conjunctivae normal.      Pupils: Pupils are equal, round, and reactive to light.   Neck:      Vascular: No JVD.      Trachea: No tracheal deviation.   Cardiovascular:      Rate and Rhythm: Normal rate and regular rhythm.      Heart sounds: Normal heart sounds. No murmur heard.  Pulmonary:      Effort: Pulmonary effort is normal. No respiratory distress.      Breath sounds: Normal breath sounds. No wheezing.   Abdominal:      General: Bowel sounds are normal.      Palpations: Abdomen is soft.      Tenderness: There is no abdominal tenderness.   Musculoskeletal:         General: No deformity. Normal range of motion.      Cervical back: Normal range of motion and neck supple.   Skin:     General: Skin is warm and dry.      Coloration: Skin is not pale.      Findings: No erythema or rash.   Neurological:      Mental Status: He is alert and oriented to person, place, and time.      Cranial Nerves: No cranial nerve deficit.   Psychiatric:         Behavior: Behavior normal.         Thought Content: Thought content normal.         Procedures       Results for orders placed or performed during the hospital encounter of 07/18/24   Comprehensive Metabolic Panel    Specimen: Blood   Result Value Ref Range    Glucose 186 (H) 65 - 99 mg/dL    BUN 19 6 - 20 mg/dL    Creatinine 1.26 0.76 - 1.27 mg/dL    Sodium 140 136 - 145 mmol/L    Potassium 3.9 3.5 - 5.2 mmol/L     Chloride 104 98 - 107 mmol/L    CO2 25.5 22.0 - 29.0 mmol/L    Calcium 9.5 8.6 - 10.5 mg/dL    Total Protein 8.0 6.0 - 8.5 g/dL    Albumin 4.0 3.5 - 5.2 g/dL    ALT (SGPT) 17 1 - 41 U/L    AST (SGOT) 19 1 - 40 U/L    Alkaline Phosphatase 121 (H) 39 - 117 U/L    Total Bilirubin 1.3 (H) 0.0 - 1.2 mg/dL    Globulin 4.0 gm/dL    A/G Ratio 1.0 g/dL    BUN/Creatinine Ratio 15.1 7.0 - 25.0    Anion Gap 10.5 5.0 - 15.0 mmol/L    eGFR 70.8 >60.0 mL/min/1.73   Single High Sensitivity Troponin T    Specimen: Blood   Result Value Ref Range    HS Troponin T 32 (H) <22 ng/L   C-reactive Protein    Specimen: Blood   Result Value Ref Range    C-Reactive Protein 0.45 0.00 - 0.50 mg/dL   High Sensitivity Troponin T    Specimen: Blood   Result Value Ref Range    HS Troponin T 32 (H) <22 ng/L   CBC Auto Differential    Specimen: Blood   Result Value Ref Range    WBC 8.38 3.40 - 10.80 10*3/mm3    RBC 5.44 4.14 - 5.80 10*6/mm3    Hemoglobin 16.3 13.0 - 17.7 g/dL    Hematocrit 50.6 37.5 - 51.0 %    MCV 93.0 79.0 - 97.0 fL    MCH 30.0 26.6 - 33.0 pg    MCHC 32.2 31.5 - 35.7 g/dL    RDW 15.6 (H) 12.3 - 15.4 %    RDW-SD 53.1 37.0 - 54.0 fl    MPV 10.6 6.0 - 12.0 fL    Platelets 147 140 - 450 10*3/mm3    Neutrophil % 68.2 42.7 - 76.0 %    Lymphocyte % 19.8 19.6 - 45.3 %    Monocyte % 8.2 5.0 - 12.0 %    Eosinophil % 2.5 0.3 - 6.2 %    Basophil % 0.7 0.0 - 1.5 %    Immature Grans % 0.6 (H) 0.0 - 0.5 %    Neutrophils, Absolute 5.71 1.70 - 7.00 10*3/mm3    Lymphocytes, Absolute 1.66 0.70 - 3.10 10*3/mm3    Monocytes, Absolute 0.69 0.10 - 0.90 10*3/mm3    Eosinophils, Absolute 0.21 0.00 - 0.40 10*3/mm3    Basophils, Absolute 0.06 0.00 - 0.20 10*3/mm3    Immature Grans, Absolute 0.05 0.00 - 0.05 10*3/mm3    nRBC 0.0 0.0 - 0.2 /100 WBC   High Sensitivity Troponin T 2Hr    Specimen: Blood   Result Value Ref Range    HS Troponin T 30 (H) <22 ng/L    Troponin T Delta -2 >=-4 - <+4 ng/L   ECG 12 Lead Chest Pain   Result Value Ref Range    QT Interval  444 ms    QTC Interval 465 ms   Green Top (Gel)   Result Value Ref Range    Extra Tube Hold for add-ons.    Lavender Top   Result Value Ref Range    Extra Tube hold for add-on    Gold Top - SST   Result Value Ref Range    Extra Tube Hold for add-ons.    Light Blue Top   Result Value Ref Range    Extra Tube Hold for add-ons.       XR Chest 1 View   Final Result   No acute cardiopulmonary process.           This report was finalized on 7/18/2024 10:55 AM by Laura Silver M.D..          CT Angiogram Heart With 3D Image   Final Result   1. Calcific plaque in the mid circumflex producing a 50-69% stenosis   (CAD RADS 3).   2. Calcific plaque in the proximal LAD and mid RCA producing a 25-49%   stenosis (CAD RADS 2).   3. Calcific plaque in the proximal RCA producing a 1-24% stenosis (CAD   RADS 1).   4. Coronary artery calcium score of 1201 which is in the    percentile for a patient of this age.   5. Mild wall thickening involving the left ventricle.   6. Moderate to severe dilatation of the right atrium and ventricle..       This report was finalized on 7/18/2024 3:40 PM by Laura Silver M.D..          US Venous Doppler Upper Extremity Left (duplex)   Final Result       1.  No DVT in the left upper extremity.   2.  No SVT in the left upper extremity.   3.  Probable ganglion cyst near the wrist measures 2.0 x 1.2 cm.       This report was finalized on 7/18/2024 5:55 AM by Rk Guerrero MD.               ED Course  ED Course as of 07/18/24 2029   Thu Jul 18, 2024   0423 EKG at 0352 shows sinus rhythm, rate 66.  MD interval 174, QRS duration 124, QTc 465 ms.  Baseline artifact.  Right bundle branch block.  Nonspecific ST-T changes.  No evidence for STEMI. [CM]   0612 US Venous Doppler Upper Extremity Left (duplex)  IMPRESSION:     1.  No DVT in the left upper extremity.  2.  No SVT in the left upper extremity.  3.  Probable ganglion cyst near the wrist measures 2.0 x 1.2 cm.   [MB]   0615 Case d/w   Nupur- he advised to obtain CT coronary angiogram. [MB]   0811 Report given to [facility] [MB]   0835 IMPRESSION:     1.  No DVT in the left upper extremity.  2.  No SVT in the left upper extremity.  3.  Probable ganglion cyst near the wrist measures 2.0 x 1.2 cm.   []   1135 IMPRESSION:  No acute cardiopulmonary process.   []   1418 Discussed care with . Nupur will follow-up with patient tomorrow in clinic. []      ED Course User Index  [] Cesar Lin PA-C  [CM] John Whitmore MD  [MB] Olga Lidia Kaur APRN                HEART Score: 4                              Medical Decision Making  Patient is a 49-year-old male with past medical history significant for COPD, hypertension, history of PE on Xarelto, sleep apnea, morbid obesity and diabetes.  He presents to the ED today with complaints of midsternal chest pain.  Reports mild shortness of breath with this.  He describes the pain as tightness and sharp.  He states that he was at work when this started about an hour ago prior to arrival.  It was recently admitted and discharged on 7/16/2024 cardiac workup.  Was noted to have a normal stress test but there was concern that this could be a false negative due to his history and risk factors.  A heart cath was attempted but patient was unable to tolerate.  Cardiology recommended an outpatient CT coronary angiogram for further evaluation.    Problems Addressed:  Chest pain, unspecified type: complicated acute illness or injury    Amount and/or Complexity of Data Reviewed  Labs: ordered.  Radiology: ordered. Decision-making details documented in ED Course.  ECG/medicine tests: ordered.    Risk  Prescription drug management.        Final diagnoses:   Chest pain, unspecified type       ED Disposition  ED Disposition       ED Disposition   Discharge    Condition   Stable    Comment   --               Dwayne Espitia MD  [address]  [facility] [address]  [place] [id number]  [phone]    Call in 1 day            Medication List      No changes were made to your prescriptions during this visit.            Cesar Lin PA-C  07/18/24 2029

## 2024-07-18 NOTE — TELEPHONE ENCOUNTER
[facility] staff attempted to follow warm transfer process and was unsuccessful     Caller: Con Colón    Relationship to patient: Self    Best call back number: [phone]    Patient is needing: PT DISCHARGED FROM  7.18.24 AND DISCHARGE PAPERWORK SAYS TO FU WITH DR SANTAMARIA 7.19.24. [facility] IS UNABLE TO SCHEDULE WITHIN THAT TIMEFRAME. PLEASE CALL BACK PT TO SCHEDULE.

## 2024-07-23 ENCOUNTER — READMISSION MANAGEMENT (OUTPATIENT)
Dept: CALL CENTER | Facility: HOSPITAL | Age: 49
End: 2024-07-23
Payer: MEDICAID

## 2024-07-23 NOTE — OUTREACH NOTE
[facility] Week 1 Survey      Flowsheet Row Responses   [facility] discharged from? [facility]   Does the patient have one of the following disease processes/diagnoses(primary or secondary)? Acute MI (STEMI,NSTEMI)   Week 1 attempt successful? No   Unsuccessful attempts Attempt 1            Josefa RUIZ - Registered Nurse

## 2024-07-25 ENCOUNTER — READMISSION MANAGEMENT (OUTPATIENT)
Dept: CALL CENTER | Facility: HOSPITAL | Age: 49
End: 2024-07-25
Payer: MEDICAID

## 2024-07-25 ENCOUNTER — OFFICE VISIT (OUTPATIENT)
Dept: CARDIOLOGY | Facility: CLINIC | Age: 49
End: 2024-07-25
Payer: MEDICAID

## 2024-07-25 ENCOUNTER — APPOINTMENT (OUTPATIENT)
Dept: CT IMAGING | Facility: HOSPITAL | Age: 49
End: 2024-07-25
Payer: MEDICAID

## 2024-07-25 ENCOUNTER — APPOINTMENT (OUTPATIENT)
Dept: GENERAL RADIOLOGY | Facility: HOSPITAL | Age: 49
End: 2024-07-25
Payer: MEDICAID

## 2024-07-25 ENCOUNTER — TELEPHONE (OUTPATIENT)
Dept: CARDIOLOGY | Facility: CLINIC | Age: 49
End: 2024-07-25
Payer: MEDICAID

## 2024-07-25 ENCOUNTER — HOSPITAL ENCOUNTER (INPATIENT)
Facility: HOSPITAL | Age: 49
LOS: 7 days | Discharge: HOME | End: 2024-08-01
Attending: STUDENT IN AN ORGANIZED HEALTH CARE EDUCATION/TRAINING PROGRAM | Admitting: INTERNAL MEDICINE
Payer: MEDICAID

## 2024-07-25 VITALS — SYSTOLIC BLOOD PRESSURE: 108 MMHG | HEART RATE: 63 BPM | DIASTOLIC BLOOD PRESSURE: 72 MMHG

## 2024-07-25 DIAGNOSIS — R09.02 HYPOXIA: ICD-10-CM

## 2024-07-25 DIAGNOSIS — R06.09 DYSPNEA ON EXERTION: Primary | ICD-10-CM

## 2024-07-25 DIAGNOSIS — E78.5 HYPERLIPIDEMIA, UNSPECIFIED HYPERLIPIDEMIA TYPE: ICD-10-CM

## 2024-07-25 DIAGNOSIS — I10 ESSENTIAL HYPERTENSION: ICD-10-CM

## 2024-07-25 DIAGNOSIS — I50.32 CHRONIC HEART FAILURE WITH PRESERVED EJECTION FRACTION (HFPEF): ICD-10-CM

## 2024-07-25 DIAGNOSIS — I50.32 CHRONIC HEART FAILURE WITH PRESERVED EJECTION FRACTION (HFPEF): Primary | ICD-10-CM

## 2024-07-25 PROBLEM — J96.01 ACUTE RESPIRATORY FAILURE WITH HYPOXIA: Status: ACTIVE | Noted: 2024-07-25

## 2024-07-25 LAB
ALBUMIN SERPL-MCNC: 3.9 G/DL (ref 3.5–5.2)
ALBUMIN/GLOB SERPL: 1.2 G/DL
ALP SERPL-CCNC: 113 U/L (ref 39–117)
ALT SERPL W P-5'-P-CCNC: 14 U/L (ref 1–41)
AMPHET+METHAMPHET UR QL: NEGATIVE
AMPHETAMINES UR QL: NEGATIVE
ANION GAP SERPL CALCULATED.3IONS-SCNC: 12.6 MMOL/L (ref 5–15)
APTT PPP: 41.3 SECONDS (ref 26.5–34.5)
AST SERPL-CCNC: 18 U/L (ref 1–40)
BARBITURATES UR QL SCN: NEGATIVE
BASOPHILS # BLD AUTO: 0.05 10*3/MM3 (ref 0–0.2)
BASOPHILS NFR BLD AUTO: 0.7 % (ref 0–1.5)
BENZODIAZ UR QL SCN: NEGATIVE
BILIRUB SERPL-MCNC: 1 MG/DL (ref 0–1.2)
BUN SERPL-MCNC: 22 MG/DL (ref 6–20)
BUN/CREAT SERPL: 18.5 (ref 7–25)
BUPRENORPHINE SERPL-MCNC: NEGATIVE NG/ML
CALCIUM SPEC-SCNC: 9.2 MG/DL (ref 8.6–10.5)
CANNABINOIDS SERPL QL: NEGATIVE
CHLORIDE SERPL-SCNC: 108 MMOL/L (ref 98–107)
CO2 SERPL-SCNC: 23.4 MMOL/L (ref 22–29)
COCAINE UR QL: NEGATIVE
CREAT SERPL-MCNC: 1.19 MG/DL (ref 0.76–1.27)
CRP SERPL-MCNC: 0.33 MG/DL (ref 0–0.5)
DEPRECATED RDW RBC AUTO: 53.1 FL (ref 37–54)
EGFRCR SERPLBLD CKD-EPI 2021: 75.8 ML/MIN/1.73
EOSINOPHIL # BLD AUTO: 0.21 10*3/MM3 (ref 0–0.4)
EOSINOPHIL NFR BLD AUTO: 2.8 % (ref 0.3–6.2)
ERYTHROCYTE [DISTWIDTH] IN BLOOD BY AUTOMATED COUNT: 15.5 % (ref 12.3–15.4)
ETHANOL BLD-MCNC: <10 MG/DL (ref 0–10)
ETHANOL UR QL: <0.01 %
FENTANYL UR-MCNC: NEGATIVE NG/ML
GEN 5 2HR TROPONIN T REFLEX: 33 NG/L
GLOBULIN UR ELPH-MCNC: 3.2 GM/DL
GLUCOSE BLDC GLUCOMTR-MCNC: 150 MG/DL (ref 70–130)
GLUCOSE BLDC GLUCOMTR-MCNC: 154 MG/DL (ref 70–130)
GLUCOSE SERPL-MCNC: 98 MG/DL (ref 65–99)
HCT VFR BLD AUTO: 50.6 % (ref 37.5–51)
HGB BLD-MCNC: 16 G/DL (ref 13–17.7)
IMM GRANULOCYTES # BLD AUTO: 0.04 10*3/MM3 (ref 0–0.05)
IMM GRANULOCYTES NFR BLD AUTO: 0.5 % (ref 0–0.5)
INR PPP: 2.31 (ref 0.9–1.1)
LYMPHOCYTES # BLD AUTO: 1.46 10*3/MM3 (ref 0.7–3.1)
LYMPHOCYTES NFR BLD AUTO: 19.3 % (ref 19.6–45.3)
MCH RBC QN AUTO: 29.6 PG (ref 26.6–33)
MCHC RBC AUTO-ENTMCNC: 31.6 G/DL (ref 31.5–35.7)
MCV RBC AUTO: 93.7 FL (ref 79–97)
METHADONE UR QL SCN: NEGATIVE
MONOCYTES # BLD AUTO: 0.52 10*3/MM3 (ref 0.1–0.9)
MONOCYTES NFR BLD AUTO: 6.9 % (ref 5–12)
NEUTROPHILS NFR BLD AUTO: 5.27 10*3/MM3 (ref 1.7–7)
NEUTROPHILS NFR BLD AUTO: 69.8 % (ref 42.7–76)
NRBC BLD AUTO-RTO: 0 /100 WBC (ref 0–0.2)
NT-PROBNP SERPL-MCNC: 2468 PG/ML (ref 0–450)
OPIATES UR QL: NEGATIVE
OXYCODONE UR QL SCN: NEGATIVE
PCP UR QL SCN: NEGATIVE
PLATELET # BLD AUTO: 151 10*3/MM3 (ref 140–450)
PMV BLD AUTO: 10.4 FL (ref 6–12)
POTASSIUM SERPL-SCNC: 4.2 MMOL/L (ref 3.5–5.2)
PROT SERPL-MCNC: 7.1 G/DL (ref 6–8.5)
PROTHROMBIN TIME: 25.4 SECONDS (ref 12.1–14.7)
QT INTERVAL: 438 MS
QT INTERVAL: 460 MS
QTC INTERVAL: 440 MS
QTC INTERVAL: 470 MS
RBC # BLD AUTO: 5.4 10*6/MM3 (ref 4.14–5.8)
SODIUM SERPL-SCNC: 144 MMOL/L (ref 136–145)
TRICYCLICS UR QL SCN: NEGATIVE
TROPONIN T DELTA: 0 NG/L
TROPONIN T SERPL HS-MCNC: 28 NG/L
TROPONIN T SERPL HS-MCNC: 33 NG/L
UFH PPP CHRO-ACNC: >1.1 IU/ML (ref 0.3–0.7)
UFH PPP CHRO-ACNC: >1.1 IU/ML (ref 0.3–0.7)
WBC NRBC COR # BLD AUTO: 7.55 10*3/MM3 (ref 3.4–10.8)

## 2024-07-25 PROCEDURE — 99223 1ST HOSP IP/OBS HIGH 75: CPT | Performed by: INTERNAL MEDICINE

## 2024-07-25 PROCEDURE — 25010000002 FUROSEMIDE PER 20 MG: Performed by: INTERNAL MEDICINE

## 2024-07-25 PROCEDURE — 71275 CT ANGIOGRAPHY CHEST: CPT | Performed by: RADIOLOGY

## 2024-07-25 PROCEDURE — 84484 ASSAY OF TROPONIN QUANT: CPT | Performed by: STUDENT IN AN ORGANIZED HEALTH CARE EDUCATION/TRAINING PROGRAM

## 2024-07-25 PROCEDURE — 71045 X-RAY EXAM CHEST 1 VIEW: CPT

## 2024-07-25 PROCEDURE — 82948 REAGENT STRIP/BLOOD GLUCOSE: CPT

## 2024-07-25 PROCEDURE — 85025 COMPLETE CBC W/AUTO DIFF WBC: CPT | Performed by: PHYSICIAN ASSISTANT

## 2024-07-25 PROCEDURE — 99222 1ST HOSP IP/OBS MODERATE 55: CPT | Performed by: INTERNAL MEDICINE

## 2024-07-25 PROCEDURE — 86140 C-REACTIVE PROTEIN: CPT | Performed by: PHYSICIAN ASSISTANT

## 2024-07-25 PROCEDURE — 85520 HEPARIN ASSAY: CPT

## 2024-07-25 PROCEDURE — 85610 PROTHROMBIN TIME: CPT | Performed by: PHYSICIAN ASSISTANT

## 2024-07-25 PROCEDURE — 82077 ASSAY SPEC XCP UR&BREATH IA: CPT | Performed by: STUDENT IN AN ORGANIZED HEALTH CARE EDUCATION/TRAINING PROGRAM

## 2024-07-25 PROCEDURE — 94799 UNLISTED PULMONARY SVC/PX: CPT

## 2024-07-25 PROCEDURE — 85730 THROMBOPLASTIN TIME PARTIAL: CPT | Performed by: PHYSICIAN ASSISTANT

## 2024-07-25 PROCEDURE — 80307 DRUG TEST PRSMV CHEM ANLYZR: CPT | Performed by: PHYSICIAN ASSISTANT

## 2024-07-25 PROCEDURE — 71275 CT ANGIOGRAPHY CHEST: CPT

## 2024-07-25 PROCEDURE — 71045 X-RAY EXAM CHEST 1 VIEW: CPT | Performed by: RADIOLOGY

## 2024-07-25 PROCEDURE — 80053 COMPREHEN METABOLIC PANEL: CPT | Performed by: PHYSICIAN ASSISTANT

## 2024-07-25 PROCEDURE — 93010 ELECTROCARDIOGRAM REPORT: CPT | Performed by: INTERNAL MEDICINE

## 2024-07-25 PROCEDURE — 25510000001 IOPAMIDOL PER 1 ML: Performed by: STUDENT IN AN ORGANIZED HEALTH CARE EDUCATION/TRAINING PROGRAM

## 2024-07-25 PROCEDURE — 99285 EMERGENCY DEPT VISIT HI MDM: CPT

## 2024-07-25 PROCEDURE — 36415 COLL VENOUS BLD VENIPUNCTURE: CPT

## 2024-07-25 PROCEDURE — 83880 ASSAY OF NATRIURETIC PEPTIDE: CPT | Performed by: PHYSICIAN ASSISTANT

## 2024-07-25 PROCEDURE — 84484 ASSAY OF TROPONIN QUANT: CPT | Performed by: INTERNAL MEDICINE

## 2024-07-25 PROCEDURE — 93005 ELECTROCARDIOGRAM TRACING: CPT | Performed by: STUDENT IN AN ORGANIZED HEALTH CARE EDUCATION/TRAINING PROGRAM

## 2024-07-25 PROCEDURE — 85520 HEPARIN ASSAY: CPT | Performed by: INTERNAL MEDICINE

## 2024-07-25 RX ORDER — AMOXICILLIN 250 MG
2 CAPSULE ORAL 2 TIMES DAILY PRN
Status: DISCONTINUED | OUTPATIENT
Start: 2024-07-25 | End: 2024-08-01 | Stop reason: HOSPADM

## 2024-07-25 RX ORDER — HEPARIN SODIUM 10000 [USP'U]/100ML
1000 INJECTION, SOLUTION INTRAVENOUS
Status: DISCONTINUED | OUTPATIENT
Start: 2024-07-25 | End: 2024-07-25

## 2024-07-25 RX ORDER — INSULIN LISPRO 100 [IU]/ML
2-9 INJECTION, SOLUTION INTRAVENOUS; SUBCUTANEOUS
Status: DISCONTINUED | OUTPATIENT
Start: 2024-07-25 | End: 2024-08-01 | Stop reason: HOSPADM

## 2024-07-25 RX ORDER — FUROSEMIDE 40 MG/1
40 TABLET ORAL DAILY
Status: CANCELLED | OUTPATIENT
Start: 2024-07-26

## 2024-07-25 RX ORDER — NICOTINE POLACRILEX 4 MG
15 LOZENGE BUCCAL
Status: DISCONTINUED | OUTPATIENT
Start: 2024-07-25 | End: 2024-08-01 | Stop reason: HOSPADM

## 2024-07-25 RX ORDER — SODIUM CHLORIDE 0.9 % (FLUSH) 0.9 %
10 SYRINGE (ML) INJECTION AS NEEDED
Status: DISCONTINUED | OUTPATIENT
Start: 2024-07-25 | End: 2024-08-01 | Stop reason: HOSPADM

## 2024-07-25 RX ORDER — POLYETHYLENE GLYCOL 3350 17 G/17G
17 POWDER, FOR SOLUTION ORAL DAILY PRN
Status: DISCONTINUED | OUTPATIENT
Start: 2024-07-25 | End: 2024-08-01 | Stop reason: HOSPADM

## 2024-07-25 RX ORDER — BISACODYL 5 MG/1
5 TABLET, DELAYED RELEASE ORAL DAILY PRN
Status: DISCONTINUED | OUTPATIENT
Start: 2024-07-25 | End: 2024-08-01 | Stop reason: HOSPADM

## 2024-07-25 RX ORDER — BISACODYL 10 MG
10 SUPPOSITORY, RECTAL RECTAL DAILY PRN
Status: DISCONTINUED | OUTPATIENT
Start: 2024-07-25 | End: 2024-08-01 | Stop reason: HOSPADM

## 2024-07-25 RX ORDER — HEPARIN SODIUM 5000 [USP'U]/ML
2000 INJECTION, SOLUTION INTRAVENOUS; SUBCUTANEOUS AS NEEDED
Status: DISCONTINUED | OUTPATIENT
Start: 2024-07-25 | End: 2024-07-25

## 2024-07-25 RX ORDER — FUROSEMIDE 10 MG/ML
40 INJECTION INTRAMUSCULAR; INTRAVENOUS ONCE
Status: COMPLETED | OUTPATIENT
Start: 2024-07-25 | End: 2024-07-25

## 2024-07-25 RX ORDER — DEXTROSE MONOHYDRATE 25 G/50ML
25 INJECTION, SOLUTION INTRAVENOUS
Status: DISCONTINUED | OUTPATIENT
Start: 2024-07-25 | End: 2024-08-01 | Stop reason: HOSPADM

## 2024-07-25 RX ORDER — NITROGLYCERIN 0.4 MG/1
0.4 TABLET SUBLINGUAL
Status: DISCONTINUED | OUTPATIENT
Start: 2024-07-25 | End: 2024-08-01 | Stop reason: HOSPADM

## 2024-07-25 RX ORDER — HEPARIN SODIUM 5000 [USP'U]/ML
4000 INJECTION, SOLUTION INTRAVENOUS; SUBCUTANEOUS AS NEEDED
Status: DISCONTINUED | OUTPATIENT
Start: 2024-07-25 | End: 2024-07-25

## 2024-07-25 RX ORDER — SODIUM CHLORIDE 9 MG/ML
40 INJECTION, SOLUTION INTRAVENOUS AS NEEDED
Status: DISCONTINUED | OUTPATIENT
Start: 2024-07-25 | End: 2024-08-01 | Stop reason: HOSPADM

## 2024-07-25 RX ORDER — SODIUM CHLORIDE 0.9 % (FLUSH) 0.9 %
10 SYRINGE (ML) INJECTION EVERY 12 HOURS SCHEDULED
Status: DISCONTINUED | OUTPATIENT
Start: 2024-07-25 | End: 2024-08-01 | Stop reason: HOSPADM

## 2024-07-25 RX ORDER — GLUCAGON 1 MG/ML
1 KIT INJECTION
Status: DISCONTINUED | OUTPATIENT
Start: 2024-07-25 | End: 2024-08-01 | Stop reason: HOSPADM

## 2024-07-25 RX ORDER — LISINOPRIL 10 MG/1
40 TABLET ORAL DAILY
Status: CANCELLED | OUTPATIENT
Start: 2024-07-26

## 2024-07-25 RX ADMIN — FUROSEMIDE 40 MG: 10 INJECTION, SOLUTION INTRAMUSCULAR; INTRAVENOUS at 14:28

## 2024-07-25 RX ADMIN — Medication 10 ML: at 21:23

## 2024-07-25 RX ADMIN — IOPAMIDOL 100 ML: 755 INJECTION, SOLUTION INTRAVENOUS at 13:30

## 2024-07-25 NOTE — CASE MANAGEMENT/SOCIAL WORK
Discharge Planning Assessment   [facility]     Patient Name: Con Colón  MRN: [id number]  Today's Date: 7/25/2024    Admit Date: 7/25/2024    Plan: Spoke with patient at bedside. Patient lives alone and will return home at discharge. Patient has no POA or Living Will. Patient uses a cpap from [employer] he has no oxygen or HH. Patients PCP Halie Dubois and pharmacy [facility]. Patients friend will transport home at discharge. Patient denies any needs at this time.   Discharge Needs Assessment       Row Name 07/25/24 8697       Living Environment    People in Home alone    Potentially Unsafe Housing Conditions none    In the past 12 months has the electric, gas, oil, or water company threatened to shut off services in your home? No    Primary Care Provided by self    Provides Primary Care For no one    Family Caregiver if Needed friend(s)    Family Caregiver Names BENJIE EL Friend   [phone]    Quality of Family Relationships unable to assess    Able to Return to Prior Arrangements yes       Resource/Environmental Concerns    Resource/Environmental Concerns none    Transportation Concerns none       Transportation Needs    In the past 12 months, has lack of transportation kept you from medical appointments or from getting medications? no    In the past 12 months, has lack of transportation kept you from meetings, work, or from getting things needed for daily living? No       Food Insecurity    Within the past 12 months, you worried that your food would run out before you got the money to buy more. Never true    Within the past 12 months, the food you bought just didn't last and you didn't have money to get more. Never true       Transition Planning    Patient/Family Anticipates Transition to home    Transportation Anticipated family or friend will provide       Discharge Needs Assessment    Readmission Within the Last 30 Days current reason for admission unrelated to previous admission    Equipment  Currently Used at Home cpap    Concerns to be Addressed discharge planning    Anticipated Changes Related to Illness none    Equipment Needed After Discharge none                   Discharge Plan       Row Name 07/25/24 1400       Plan    Plan Spoke with patient at bedside. Patient lives alone and will return home at discharge. Patient has no POA or Living Will. Patient uses a cpap from [employer] he has no oxygen or HH. Patients PCP Halie Dubois and pharmacy [facility]. Patients friend will transport home at discharge. Patient denies any needs at this time.    Patient/Family in Agreement with Plan yes                 Irish Goins

## 2024-07-25 NOTE — CONSULTS
[facility]  CONSULT  NOTE      Patient information:  Date of Admit: 7/25/2024  Date of Consult: 07/25/24  Hospitalist/Referring MD:Davey Rubio MD  PCP: Halie Dubois APRN  MRN:  [id number]  Visit Number:  [id number]    LOS: 0  CODE STATUS:  Code Status and Medical Interventions: CPR (Attempt to Resuscitate); Full Support   Ordered at: 07/25/24 1425     Level Of Support Discussed With:    Patient     Code Status (Patient has no pulse and is not breathing):    CPR (Attempt to Resuscitate)     Medical Interventions (Patient has pulse or is breathing):    Full Support     PROBLEM LIST: Principal Problem:    Acute respiratory failure with hypoxia    Reason for Cardiology consultation: Consideration for [facility]     General Cardiology Consulting Physician: Dr. Micheal Chang MD, [facility]    Primary Cardiologist: JORI Moran    Assessment              Recommendations             Subjective Data     7/25/2024    ADMISSION INFORMATION:  Chief Complaint   Patient presents with    Syncope    Shortness of Breath     History of Present Illness    Con Colón is a 49 y.o. male with a past medical history significant for coronary artery disease with recent CT coronary angiogram showing multivessel CAD with worst area being 50 to 70% stenosis in the left circumflex artery with total calcium score of 1200, HFpEF w EF of 66-70% (7/14/2024), NELY, Cardiomegaly, DMT2, HLD, HTN, COPD, history of PE anticoagulated with Xarelto and obesiry.     Patient presented to [facility] ([facility]) emergency room (ER) on 7/25/2024 with complaints of Dyspnea and unable to walk 10 yards.     Patient was seen by Cardiologist today for routine FU s/p recent hospitalization here at [facility] 7/13-7/16/2024 and EMS was called to bring him to the ER for further evaluation. Of note, per DC summary, heart catheterization was recommended on this admission but patient was noted to not be able to lay flat  "for the procedure at that time.     Admission labs reveal HS troponin 33-> 33. (Previous admission 26-32).  proBNP 2,468.0. Cr 1.19. Hgb 16.0 and Plt 151.     Cardiology has been consulted for further evaluation and management for consideration for [facility].     Primary Cardiologist has been JORI Moran and he was last seen in the office on 7/25/2024.     Patient was in room 302 A when he was seen and examined by Dr. Chang.  Patient reports he was at the Cardiologist office to FU on CTA Heart results. He reports he gets short of breath walking to his truck everyday and today was not different. He reports, \"I get short of breath like that all the time.\" He reports he quit smoking many years ago. He reports he weighed 349 the last time he weighed and today he weighed 380 and states, \" I have edema all the time and I have not noticed any more than usual\". He does report x 2 full urinals of UOP while in the ER today. He reports his last dose of Xarelto was this AM.     EVENT TIMELINE:   7/25: Xarelto changed to IV Heparin per Dr. Rubio on admission. Last dose of Xarelto was this AM per patient.     Known medications given enroute via EMS and in the ER:       Personal History     Cardiac risk factors:arteriosclerotic heart disease      Last Echo: Results for orders placed during the hospital encounter of 07/13/24    Adult Transthoracic Echo Complete w/ Color, Spectral and Contrast if necessary per protocol    Interpretation Summary    Left ventricular systolic function is normal. Calculated left ventricular EF = 69% Left ventricular ejection fraction appears to be 66 - 70%.    Left ventricular wall thickness is consistent with mild concentric hypertrophy.    The right ventricular cavity is moderate to severely dilated.    The left atrial cavity is mildly dilated.    Left atrial volume is mildly increased.    The right atrial cavity is severely  dilated.    Estimated right ventricular systolic pressure from tricuspid " regurgitation is markedly elevated (>55 mmHg). Calculated right ventricular systolic pressure from tricuspid regurgitation is 55 mmHg.    The aortic root measures 3.7 cm.         Last Stress: Results for orders placed during the hospital encounter of 07/13/24    Stress Test With Myocardial Perfusion One Day    Interpretation Summary    Myocardial perfusion imaging indicates a normal myocardial perfusion study with no evidence of ischemia.    Left ventricular ejection fraction is normal (Calculated EF = 61%).    TID 1.12.    Findings consistent with a normal ECG stress test.      Last Cath:  none found in chart      CTA Heart:   7/18/2024    Study Result    Narrative & Impression   PROCEDURE: CT ANGIOGRAM HEART W 3D IMAGE-     HISTORY: Persistent chest pain     COMPARISON: None .     TECHNIQUE: Multiple axial CT images were obtained from the aortic arch  branch vessels through the upper abdomen following the administration of  Isovue 300 per the CTA protocol. 3D MIP images were reconstructed from  the original axial data set. This study was performed with techniques to  keep radiation doses as low as reasonably achievable (ALARA).  Individualized dose reduction techniques using automated exposure  control or adjustment of mA and/or kV according to the patient size were  employed.     FINDINGS:     Coronary Calcium Score (Agatson):  LM:    0  RCA: 413  PDA: 9  LAD: 558  LCX: 170  Other: 51     TOTAL: 1201     Coronary Angiography:     Left Main: The left main originates from the left coronary cusp. It is  normal in caliber with no calcified or soft plaque.     Left Anterior Descending: The LAD is patent. There is calcific plaque in  the proximal LAD producing a 25-49% stenosis (CAD RADS 2). The LAD gives  off 2 patent diagonal branches.     Ramus intermedius: Patent with no evidence of plaque or stenosis.     Left Circumflex: The LCX is patent. There is calcific plaque in the mid  circumflex which produces a 50-69%  stenosis (CAD RADS 3). The LCX gives  off 2 patent obtuse marginal branches.     Right Coronary Artery: The RCA is patent. There is calcific plaque in  the proximal and mid RCA producing a 1-24% stenosis (CAD RADS 1)  proximally and a 25-49% stenosis (CAD RADS 2) in the mid portions. The  RCA terminates as a PDA and right posterolateral branch.     Left Atrium: The left atrium is normal in size with no filling defects.     Left Ventricle: The ventricular cavity size is within normal limits.  There are no stigmata of prior infarction. There are no filling defects.  There is mild ventricular wall thickening.     Right atrium: Moderate to severe dilatation.     Right ventricle: Moderate to severe dilatation.     Pulmonary Arteries: Normal with no filling defects.     Pulmonary Veins: Normal venous drainage.     Pericardium: Normal thickness with no significant effusion.     Cardiac Valves: There is no thickening or calcifications involving the  aortic or mitral valves.     Aorta: Normal in caliber with no evidence of dissection.     Lungs and Mediastinum: There is no evidence of a mediastinal mass or  adenopathy. The lungs are clear with no focal opacities or suspicious  pulmonary nodules.     IMPRESSION:  1. Calcific plaque in the mid circumflex producing a 50-69% stenosis  (CAD RADS 3).  2. Calcific plaque in the proximal LAD and mid RCA producing a 25-49%  stenosis (CAD RADS 2).  3. Calcific plaque in the proximal RCA producing a 1-24% stenosis (CAD  RADS 1).  4. Coronary artery calcium score of 1201 which is in the   percentile for a patient of this age.  5. Mild wall thickening involving the left ventricle.  6. Moderate to severe dilatation of the right atrium and ventricle..     This report was finalized on 7/18/2024 3:40 PM by Laura Silver M.D..          Past Medical History:   Diagnosis Date    Cardiomegaly     COPD (chronic obstructive pulmonary disease)     Hypertension     Pulmonary embolism      PAST    Sleep apnea      History reviewed. No pertinent surgical history.  Family History   Problem Relation Age of Onset    Diabetes Other      Social History     Tobacco Use    Smoking status: Former    Smokeless tobacco: Current     Types: Chew    Tobacco comments:     2019 QUIT   Vaping Use    Vaping status: Never Used    Passive vaping exposure: Yes   Substance Use Topics    Alcohol use: Never    Drug use: Never       ALLERGIES: Nuvigil [armodafinil] and Provigil [modafinil]    Medications listed below are reported home medications pulling from within the system:  Medications Prior to Admission   Medication Sig Dispense Refill Last Dose    albuterol sulfate  (90 Base) MCG/ACT inhaler Inhale 2 puffs Every 6 (Six) Hours As Needed for Wheezing or Shortness of Air (COPD).   7/25/2024    aspirin 81 MG EC tablet Take 1 tablet by mouth Daily. 30 tablet 0 7/25/2024    atorvastatin (LIPITOR) 40 MG tablet Take 1 tablet by mouth Every Night. 30 tablet 0 7/24/2024    budesonide-formoterol (SYMBICORT) 160-4.5 MCG/ACT inhaler Inhale 2 puffs 2 (Two) Times a Day As Needed (COPD).   7/25/2024    cetirizine (zyrTEC) 10 MG tablet Take 1 tablet by mouth Daily.   7/25/2024    empagliflozin (JARDIANCE) 10 MG tablet tablet Take 1 tablet by mouth Daily. 30 tablet 1 7/25/2024    furosemide (LASIX) 40 MG tablet Take 1 tablet by mouth Daily.   7/25/2024    lisinopril (PRINIVIL,ZESTRIL) 40 MG tablet TAKE 1 TABLET BY MOUTH DAILY 90 tablet 0 7/25/2024    metFORMIN (GLUCOPHAGE) 1000 MG tablet Take 1 tablet by mouth 2 (Two) Times a Day With Meals.   7/25/2024    metoprolol tartrate (LOPRESSOR) 25 MG tablet Take 1 tablet by mouth Every 12 (Twelve) Hours. 60 tablet 0 7/25/2024    rivaroxaban (XARELTO) 20 MG tablet Take 1 tablet by mouth Daily With Dinner.   7/25/2024    cholecalciferol (VITAMIN D3) 1.25 MG (56697 UT) capsule Take 1 capsule by mouth Every 7 (Seven) Days.   not picked up yet       Review of Systems   Constitutional:   Positive for unexpected weight change. Negative for activity change and diaphoresis.   HENT:  Negative for facial swelling and trouble swallowing.    Eyes:  Negative for visual disturbance.   Respiratory:  Positive for shortness of breath. Negative for apnea, cough, chest tightness, wheezing and stridor.    Cardiovascular:  Positive for leg swelling. Negative for chest pain and palpitations.   Gastrointestinal:  Negative for abdominal distention, nausea and vomiting.   Endocrine: Negative.    Genitourinary: Negative.    Skin:  Negative for color change.   Neurological:  Negative for dizziness, syncope, speech difficulty and weakness.   Psychiatric/Behavioral:  Negative for agitation and behavioral problems.      Objective Data      Vital Signs  Temp:  [97.8 °F (36.6 °C)-98.8 °F (37.1 °C)] 97.8 °F (36.6 °C)  Heart Rate:  [4-67] 4  Resp:  [16] 16  BP: ()/(68-88) 122/79  Flow (L/min):  [2] 2  Device (Oxygen Therapy): room air  Vital Signs (last 72 hrs)         07/22 0700 07/23 0659 07/23 0700 07/24 0659 07/24 0700 07/25 0659 07/25 0700  07/25 1548   Most Recent      Temp (°F)       97.8 -  98.8     97.8 (36.6) 07/25 1508    Heart Rate       4 -  67     4 07/25 1508    Resp         16     16 07/25 1508    BP       92/68 -  123/82     122/79 07/25 1508    SpO2 (%)       87 -  92     92 07/25 1508    Flow (L/min)         2     2  Comment: per provider verbal order 07/25 1350          BMI:  Body mass index is 53.02 kg/m².  WEIGHT:      07/25/24  1015 07/25/24  1508   Weight: (!) 172 kg (380 lb) (!) 172 kg (380 lb)     DIET:  NPO Diet NPO Type: Strict NPO  Diet: Cardiac, Diabetic; Healthy Heart (2-3 Na+); Consistent Carbohydrate; Fluid Consistency: Thin (IDDSI 0)  I&O:  Intake & Output (last 3 days)         07/22 0701 07/23 0700 07/23 0701 07/24 0700 07/24 0701 07/25 0700 07/25 0701 07/26 0700    P.O.    220    Total Intake(mL/kg)    220 (1.3)    Net    +220                  Physical Exam  Constitutional:        General: He is not in acute distress.     Appearance: Normal appearance. He is obese. He is not ill-appearing, toxic-appearing or diaphoretic.      Comments: BMI 53.02.   HENT:      Head: Normocephalic and atraumatic.      Nose: Nose normal.      Mouth/Throat:      Mouth: Mucous membranes are moist.   Eyes:      Extraocular Movements: Extraocular movements intact.      Pupils: Pupils are equal, round, and reactive to light.   Neck:      Vascular: JVD present.      Comments: Mild JVD noted  Cardiovascular:      Rate and Rhythm: Normal rate and regular rhythm.      Heart sounds: Normal heart sounds.   Pulmonary:      Effort: Pulmonary effort is normal.      Comments: Decreased breath sounds with a few rales noted bilaterally.   Abdominal:      General: Bowel sounds are normal.      Palpations: Abdomen is soft.   Musculoskeletal:         General: Normal range of motion.      Cervical back: Normal range of motion.      Right lower le+ Edema present.      Left lower le+ Edema present.   Skin:     General: Skin is warm and dry.   Neurological:      General: No focal deficit present.      Mental Status: He is alert and oriented to person, place, and time. Mental status is at baseline.   Psychiatric:         Mood and Affect: Mood normal.         Behavior: Behavior normal.         Thought Content: Thought content normal.         Judgment: Judgment normal.         Results review   Results Review:    I have reviewed the patient's new clinical results. 24 16:30 EDT    Results from last 7 days   Lab Units 24  1247 24  1038   HSTROP T ng/L 33* 33*     Lab Results   Component Value Date    PROBNP 2,468.0 (H) 2024     Results from last 7 days   Lab Units 24  1038   WBC 10*3/mm3 7.55   HEMOGLOBIN g/dL 16.0   PLATELETS 10*3/mm3 151     Results from last 7 days   Lab Units 24  1038   SODIUM mmol/L 144   POTASSIUM mmol/L 4.2   CHLORIDE mmol/L 108*   CO2 mmol/L 23.4   BUN mg/dL 22*  "  CREATININE mg/dL 1.19   CALCIUM mg/dL 9.2   GLUCOSE mg/dL 98   ALT (SGPT) U/L 14   AST (SGOT) U/L 18     Lab Results   Component Value Date    MG 1.92 2019    MG 1.95 2019     Lab Results   Component Value Date    CHOL 120 2022    TRIG 49 2022    HDL 32 (L) 2022    LDL 77 2022     Estimated Creatinine Clearance: 123.7 mL/min (by C-G formula based on SCr of 1.19 mg/dL).  Lab Results   Component Value Date    HGBA1C 6.50 (H) 07/15/2024     Lab Results   Component Value Date    INR 2.31 (H) 2024    INR 1.33 (H) 2024    INR 1.02 (L) 2020    INR 1.37 (L) 2020    INR 1.21 (L) 2019    INR 1.21 (L) 2019    INR 1.21 (L) 12/10/2019     Lab Results   Component Value Date    LABHEPA <0.10 (L) 2024    LABHEPA 0.40 2024    LABHEPA 0.12 (L) 07/15/2024    LABHEPA 0.24 (L) 07/15/2024         Lab Results   Component Value Date    TSH 1.630 2022      No results found for: \"URICACID\"  Pain Management Panel          Latest Ref Rng & Units 2024   Pain Management Panel   Amphetamine, Urine Qual Negative Negative    Barbiturates Screen, Urine Negative Negative    Benzodiazepine Screen, Urine Negative Negative    Buprenorphine, Screen, Urine Negative Negative    Cocaine Screen, Urine Negative Negative    Fentanyl, Urine Negative Negative    Methadone Screen , Urine Negative Negative    Methamphetamine, Ur Negative Negative      Microbiology Results (last 10 days)       ** No results found for the last 240 hours. **           No results found for: \"BLOODCX\"      EC2024      ECG/EMG Results (last 24 hours)       Procedure Component Value Units Date/Time    ECG 12 Lead Syncope [[id number]] Collected: 24 1028     Updated: 24 1319     QT Interval 438 ms      QTC Interval 440 ms     Narrative:      Test Reason : Syncope  Blood Pressure :   */*   mmHG  Vent. Rate :  61 BPM     Atrial Rate :  61 BPM     P-R Int " : 200 ms          QRS Dur : 104 ms      QT Int : 438 ms       P-R-T Axes :  77 216 237 degrees     QTc Int : 440 ms      Normal sinus rhythm  Possible Lateral infarct (cited on or before 25-JUL-2024)  Inferior-posterior infarct , possibly acute  Right bundle branch block -or- Right ventricular hypertrophy    Abnormal ECG  When compared with ECG of 18-JUL-2024 03:52,    No significant change was found  Confirmed by Keith Duval ([id number]) on 7/25/2024 1:19:07 PM    Referred By: KIANNA           Confirmed By: Keith Duval    ECG 12 Lead Syncope [[id number]] Collected: 07/25/24 1034     Updated: 07/25/24 1319     QT Interval 460 ms      QTC Interval 470 ms     Narrative:      Test Reason : Syncope  Blood Pressure :   */*   mmHG  Vent. Rate :  63 BPM     Atrial Rate :  63 BPM     P-R Int : 160 ms          QRS Dur : 122 ms      QT Int : 460 ms       P-R-T Axes : 100 269 -67 degrees     QTc Int : 470 ms      Normal sinus rhythm  Possible Left atrial enlargement  Right bundle branch block  Possible Lateral infarct (cited on or before 25-JUL-2024)  T wave abnormality, consider inferior ischemia  Abnormal ECG  When compared with ECG of 25-JUL-2024 10:28, (Unconfirmed)  No significant change was found  Confirmed by Keith Duval ([id number]) on 7/25/2024 1:19:43 PM    Referred By: KIANNA           Confirmed By: Keith Duval            TELEMETRY:   SR 60's with BBB        RADIOLOGY STUDIES:  Imaging Results (Last 72 Hours)       Procedure Component Value Units Date/Time    CT Angiogram Chest Pulmonary Embolism [[id number]] Collected: 07/25/24 1334     Updated: 07/25/24 1337    Narrative:      EXAM:    CT Angiography Chest With Intravenous Contrast     EXAM DATE:    7/25/2024 1:21 PM     CLINICAL HISTORY:    SOB, chest pain     TECHNIQUE:    Axial computed tomographic angiography images of the chest with  intravenous contrast.  This CT exam was performed using one or more of  the following dose reduction techniques:  automated  exposure control,  adjustment of the mA and/or kV according to patient size, and/or use of  iterative reconstruction technique.    MIP reconstructed images were created and reviewed.     COMPARISON:    No relevant prior studies available.     FINDINGS:    Limitations:  Suboptimal opacification of the pulmonary arteries.    Pulmonary arteries:  No pulmonary embolism is identified.  Some of the  distal pulmonary arteries cannot be evaluated due to suboptimal  opacification.    Aorta:  No acute findings.  No thoracic aortic aneurysm.    Lungs and pleural spaces:  Patchy bilateral groundglass attenuation  suggesting of small airways trapping.  No mass.  No consolidation.  No  significant effusion.    Heart:  Unremarkable as visualized.  No cardiomegaly.  No significant  pericardial effusion.  No evidence of RV dysfunction.    Bones/joints:  No acute fracture.  No dislocation.    Soft tissues:  Unremarkable as visualized.    Lymph nodes:  Unremarkable as visualized.  No enlarged lymph nodes.    Liver:  Cirrhotic nodular liver contour. Tiny ascites.       Impression:      1.  No pulmonary embolism is identified.  Some of the distal pulmonary  arteries cannot be evaluated due to suboptimal opacification.  2.  Patchy bilateral groundglass attenuation suggesting of small airways  trapping.  3.  Cirrhotic nodular liver contour. Tiny ascites.        This report was finalized on 7/25/2024 1:34 PM by Dr. Miguel Dillon MD.       XR Chest 1 View [[id number]] Collected: 07/25/24 1115     Updated: 07/25/24 1120    Narrative:      EXAM:    XR Chest, 1 View     EXAM DATE:    7/25/2024 10:44 AM     CLINICAL HISTORY:    chest pain     TECHNIQUE:    Frontal view of the chest.     COMPARISON:    7/18/2024     FINDINGS:    LUNGS AND PLEURAL SPACES:  Pulmonary vascular congestion.  No  consolidation.  No pneumothorax.    HEART:  Cardiomegaly.    MEDIASTINUM:  Unremarkable as visualized.  Normal mediastinal contour.    BONES/JOINTS:   Unremarkable as visualized.  No acute fracture.    OTHER FINDINGS:  Coarsened.       Impression:      1.  Cardiomegaly.  2.  Pulmonary vascular congestion.  3.  Coarsened.        This report was finalized on 7/25/2024 11:17 AM by Dr. Miguel Dillon MD.               ALLERGIES: Nuvigil [armodafinil] and Provigil [modafinil]    CURRENT MEDICATIONS:  Current list of medications may not reflect those currently placed in orders that are not signed or are being held.     insulin lispro, 2-9 Units, Subcutaneous, 4x Daily AC & at Bedtime  sodium chloride, 10 mL, Intravenous, Q12H      heparin, 1,000 Units/hr  Pharmacy to Dose Heparin,         senna-docusate sodium **AND** polyethylene glycol **AND** bisacodyl **AND** bisacodyl    Calcium Replacement - Follow Nurse / BPA Driven Protocol    dextrose    dextrose    glucagon (human recombinant)    Magnesium Standard Dose Replacement - Follow Nurse / BPA Driven Protocol    nitroglycerin    Pharmacy to Dose Heparin    Phosphorus Replacement - Follow Nurse / BPA Driven Protocol    Potassium Replacement - Follow Nurse / BPA Driven Protocol    [COMPLETED] Insert Peripheral IV **AND** sodium chloride    sodium chloride    sodium chloride    Thank you very much for asking us to be involved in this patient's care.  We will follow along with you. Please do not hesitate to call for any questions or concerns.     I have discussed the patients findings and recommendations with the patient.    Electronically signed by JACEK Seo, 07/25/24, 4:40 PM EDT.                       Please note that portions of this note were copied and has been reviewed and is accurate as of 7/25/2024 .      Please note that portions of this note were completed with a voice recognition program.

## 2024-07-25 NOTE — PROGRESS NOTES
HEPARIN INFUSION  Con Colón is a  49 y.o. male receiving heparin infusion.     Therapy for (VTE/Cardiac):   Cardiac  Patient Dosing Weight: 172 kg   Initial Bolus (Y/N):   N  Any Bolus (Y/N):   Y        Signs or Symptoms of Bleeding: N    Cardiac or Other (Not VTE)   Initial Bolus: 60 units/kg (Max 4,000 units)  Initial rate: 12 units/kg/hr (Max 1,000 units/hr)   Anti Xa Rebolus Infusion Hold time Change infusion Dose (Units/kg/hr) Next Anti Xa or aPTT Level Due   < 0.11 50 Units/kg  (4000 Units Max) None Increase by  3 Units/kg/hr 6 hours   0.11- 0.19 25 Units/kg  (2000 Units Max) None Increase by  2 Units/kg/hr 6 hours   0.2 - 0.29 0 None Increase by  1 Units/kg/hr 6 hours   0.3 - 0.5 0 None No Change 6 hours (after 2 consecutive levels in range check q24h @0700)   0.51 - 0.6 0 None Decrease by  1 Units/kg/hr 6 hours   0.61 - 0.8 0 30 Minutes Decrease by  2 Units/kg/hr 6 hours   0.81 - 1 0 60 Minutes Decrease by  3 Units/kg/hr 6 hours   >1 0 Hold  After Anti Xa less than 0.5 decrease previous rate by  4 Units/kg/hr  Every 2 hours until Anti Xa  less than 0.5 then when infusion restarts in 6 hours         Recommend anti-Xa every 6 hours.          Date   Time   Anti-Xa Current Rate (Unit/kg/hr) Bolus   (Units) Rate Change   (Unit/kg/hr) New Rate (Unit/kg/hr) Next   Anti-Xa Comments  Pump Check Daily   7/25 1612 > 1.1 hold None initially hold hold 2300 On Xarelto at home, initial anti-Xa level over 1. Will hold until less than 1.Spoke with FEI Jeff.                                                                                                                                                                                                                                   Pharmacy will continue to follow anti-Xa results and monitor for signs and symptoms of bleeding or thrombosis.    Thank you.  Liat Sánchez, Pharm.D.  7/25/2024  17:22 EDT

## 2024-07-25 NOTE — PLAN OF CARE
Goal Outcome Evaluation:      Patient resting in bed at this time voicing no complaints or concerns. Pt pleasant and cooperative with care this shift. Pt A&Ox4 and refusing bed alarm at this time. Call light is in reach, IV access maintained, VSS with O2 saturation >90% on room air, no s/s of acute distress noted at this time. Plan of care ongoing.

## 2024-07-25 NOTE — PROGRESS NOTES
"Halie Dubois, JACEK  Con Colón  1975 07/25/2024    Patient Active Problem List   Diagnosis    Type 2 diabetes mellitus with hyperglycemia    Essential hypertension    Hyperlipidemia    Venous insufficiency    Anticoagulant long-term use    History of pulmonary embolus (PE)    Morbid obesity with BMI of 50.0-59.9, adult    History of DVT (deep vein thrombosis)    Chronic edema    NSTEMI (non-ST elevated myocardial infarction)    Chest pain    Heart failure, unspecified    Type 2 myocardial infarction    Chronic heart failure with preserved ejection fraction (HFpEF)     Dear Halie Dubois, JACEK:    Subjective     History of Present Illness:    No chief complaint on file.      Con Colón is a pleasant 49 y.o. male with a past medical history significant for coronary artery disease with recent CT coronary angiogram showing multivessel CAD with worst area being 50 to 70% stenosis in the left circumflex artery with total calcium score of 1200, HFpEF likely exacerbated by his obesity and potentially underlying sleep apnea.,  History of PE anticoagulated with Xarelto. He comes in for hospital follow up.     Con came in today for routine follow-up after recent hospitalization.  He did come into the cardiologist office next-door building however and was unable to walk to our building due to significant shortness of breath and fear of \"passing out\" we then did grab a wheelchair and wheeled him over here for evaluation.  Subsequent vitals were stable and EKG shows no new changes but does have chronic ST-T wave changes and the anterior lateral leads.  He denies any chest pains today or any recent chest pains since he was discharged from the hospital.  I did recommend proceeding with TMS especially given recent CT coronary angiogram showing close to 70% stenosis in his RCA.        Allergies   Allergen Reactions    Nuvigil [Armodafinil] Palpitations    Provigil [Modafinil] Palpitations   :      Current " Outpatient Medications:     albuterol sulfate  (90 Base) MCG/ACT inhaler, Inhale 2 puffs Every 6 (Six) Hours As Needed for Wheezing or Shortness of Air (COPD)., Disp: , Rfl:     aspirin 81 MG EC tablet, Take 1 tablet by mouth Daily., Disp: 30 tablet, Rfl: 0    atorvastatin (LIPITOR) 40 MG tablet, Take 1 tablet by mouth Every Night., Disp: 30 tablet, Rfl: 0    budesonide-formoterol (SYMBICORT) 160-4.5 MCG/ACT inhaler, Inhale 2 puffs 2 (Two) Times a Day As Needed (COPD)., Disp: , Rfl:     cetirizine (zyrTEC) 10 MG tablet, Take 1 tablet by mouth Daily., Disp: , Rfl:     empagliflozin (JARDIANCE) 10 MG tablet tablet, Take 1 tablet by mouth Daily., Disp: 30 tablet, Rfl: 1    furosemide (LASIX) 40 MG tablet, Take 1 tablet by mouth Daily., Disp: , Rfl:     lisinopril (PRINIVIL,ZESTRIL) 40 MG tablet, TAKE 1 TABLET BY MOUTH DAILY, Disp: 90 tablet, Rfl: 0    metFORMIN (GLUCOPHAGE) 1000 MG tablet, Take 1 tablet by mouth 2 (Two) Times a Day With Meals., Disp: , Rfl:     metoprolol tartrate (LOPRESSOR) 25 MG tablet, Take 1 tablet by mouth Every 12 (Twelve) Hours., Disp: 60 tablet, Rfl: 0    Xarelto 20 MG tablet, TAKE 1 TABLET BY MOUTH DAILY, Disp: 90 tablet, Rfl: 0    The following portions of the patient's history were reviewed and updated as appropriate: allergies, current medications, past family history, past medical history, past social history, past surgical history and problem list.    Social History     Tobacco Use    Smoking status: Former    Smokeless tobacco: Current     Types: Chew    Tobacco comments:     2019 QUIT   Vaping Use    Vaping status: Never Used    Passive vaping exposure: Yes   Substance Use Topics    Alcohol use: Never    Drug use: Never         Objective   Vitals:    07/25/24 0940   BP: 108/72   Pulse: 63     There is no height or weight on file to calculate BMI.    ROS    Constitutional:       General: Not in acute distress.     Appearance: Healthy appearance. Well-developed and not in  "distress. Not diaphoretic.      Comments: In wheel chair   Eyes:      Conjunctiva/sclera: Conjunctivae normal.      Pupils: Pupils are equal, round, and reactive to light.   HENT:      Head: Normocephalic and atraumatic.   Neck:      Vascular: No carotid bruit or JVD.   Pulmonary:      Effort: Pulmonary effort is normal. No respiratory distress.      Breath sounds: Normal breath sounds.   Cardiovascular:      Normal rate. Regular rhythm.   Edema:     Peripheral edema absent.   Skin:     General: Skin is cool.   Neurological:      Mental Status: Alert, oriented to person, place, and time and oriented to person, place and time.         Lab Results   Component Value Date     07/18/2024    K 3.9 07/18/2024     07/18/2024    CO2 25.5 07/18/2024    BUN 19 07/18/2024    CREATININE 1.26 07/18/2024    GLUCOSE 186 (H) 07/18/2024    CALCIUM 9.5 07/18/2024    AST 19 07/18/2024    ALT 17 07/18/2024    ALKPHOS 121 (H) 07/18/2024    LABIL2 0.8 (L) 01/13/2021     No results found for: \"CKTOTAL\"  Lab Results   Component Value Date    WBC 8.38 07/18/2024    HGB 16.3 07/18/2024    HCT 50.6 07/18/2024     07/18/2024     Lab Results   Component Value Date    INR 1.33 (H) 07/13/2024    INR 1.02 (L) 11/16/2020    INR 1.37 (L) 01/16/2020     Lab Results   Component Value Date    MG 1.92 12/13/2019     Lab Results   Component Value Date    TSH 1.630 01/26/2022    TRIG 49 01/26/2022    HDL 32 (L) 01/26/2022    LDL 77 01/26/2022      Lab Results   Component Value Date    BNP 1152 (H) 01/13/2021       During this visit the following were done:  Labs Reviewed []    Labs Ordered []    Radiology Reports Reviewed []    Radiology Ordered []    PCP Records Reviewed []    Referring Provider Records Reviewed []    ER Records Reviewed []    Hospital Records Reviewed []    History Obtained From Family []    Radiology Images Reviewed []    Other Reviewed []    Records Requested []         ECG 12 Lead    Date/Time: 7/25/2024 9:58 " AM  Performed by: Kirill Grover PA-C    Authorized by: Kirill Grover PA-C  Comparison: compared with previous ECG   Similar to previous ECG  Rhythm: sinus rhythm  Conduction: right bundle branch block  ST Depression: V2, V3, V4, V1, aVF and III  T inversion: V4, V2, V3, V1, aVF and III    Clinical impression: abnormal EKG          Assessment & Plan    Diagnosis Plan   1. Chronic heart failure with preserved ejection fraction (HFpEF)        2. Essential hypertension        3. Hyperlipidemia, unspecified hyperlipidemia type                 Recommendations:  Severe dyspnea on exertion  Given patient's severe dyspnea with just walking roughly 10 yards in our parking lot requiring a wheelchair to then rest I did recommend going to the ER.  This could be acute on chronic HFpEF versus angio equivalent given his possibly nearly obstructive CAD with 50 to 70% stenosis seen in his left circumflex.  I did recommend him going to the ER for better more thorough evaluation he was agreeable.  We did contact EMS who did come and pick patient up and take him to [facility] emergency room.  I did call and give report.    No follow-ups on file.    As always, I appreciate very much the opportunity to participate in the cardiovascular care of your patients.      With Best Regards,    Kirill Grover PA-C

## 2024-07-25 NOTE — ED NOTES
Patient reports shortness of breath and oxygen saturation of 88-90% on room air. Provider made aware. New orders rec'd for blood gas.

## 2024-07-25 NOTE — ED PROVIDER NOTES
Subjective   History of Present Illness  This is a 49 year old male patient who presents to the ER with chief complaint of SOB. PMH significant for HTN, HLD, DM, NELY, history of PE anticoagulated on xarelto, CHF. He was sent from his cardiologist's office because he was experiencing significant SOB with exertion. He had a recent inpatient admission for NSTEMI from 07/13-07/16/24. He said he was SOB, dizziness and near-syncope. Denies chest pain.       Review of Systems   Constitutional: Negative.  Negative for fever.   HENT: Negative.     Respiratory:  Positive for shortness of breath. Negative for apnea, cough, choking, chest tightness, wheezing and stridor.    Cardiovascular: Negative.  Negative for chest pain.   Gastrointestinal: Negative.  Negative for abdominal pain.   Endocrine: Negative.    Genitourinary: Negative.  Negative for dysuria.   Skin: Negative.    Neurological:  Positive for dizziness. Negative for tremors, seizures, syncope, facial asymmetry, speech difficulty, weakness, light-headedness, numbness and headaches.   Psychiatric/Behavioral: Negative.     All other systems reviewed and are negative.      Past Medical History:   Diagnosis Date    Cardiomegaly     COPD (chronic obstructive pulmonary disease)     Hypertension     Pulmonary embolism     PAST    Sleep apnea        Allergies   Allergen Reactions    Nuvigil [Armodafinil] Palpitations    Provigil [Modafinil] Palpitations       No past surgical history on file.    Family History   Problem Relation Age of Onset    Diabetes Other        Social History     Socioeconomic History    Marital status:    Tobacco Use    Smoking status: Former    Smokeless tobacco: Current     Types: Chew    Tobacco comments:     2019 QUIT   Vaping Use    Vaping status: Never Used    Passive vaping exposure: Yes   Substance and Sexual Activity    Alcohol use: Never    Drug use: Never    Sexual activity: Defer           Objective   Physical Exam  Vitals and  nursing note reviewed.   Constitutional:       General: He is not in acute distress.     Appearance: He is well-developed. He is not diaphoretic.   HENT:      Head: Normocephalic and atraumatic.      Right Ear: External ear normal.      Left Ear: External ear normal.      Nose: Nose normal.   Eyes:      Conjunctiva/sclera: Conjunctivae normal.      Pupils: Pupils are equal, round, and reactive to light.   Neck:      Vascular: No JVD.      Trachea: No tracheal deviation.   Cardiovascular:      Rate and Rhythm: Normal rate and regular rhythm.      Heart sounds: Normal heart sounds. No murmur heard.  Pulmonary:      Effort: Pulmonary effort is normal. No respiratory distress.      Breath sounds: Normal breath sounds. No wheezing.   Abdominal:      General: Bowel sounds are normal.      Palpations: Abdomen is soft.      Tenderness: There is no abdominal tenderness.   Musculoskeletal:         General: No deformity. Normal range of motion.      Cervical back: Normal range of motion and neck supple.   Skin:     General: Skin is warm and dry.      Coloration: Skin is not pale.      Findings: No erythema or rash.   Neurological:      General: No focal deficit present.      Mental Status: He is alert and oriented to person, place, and time. Mental status is at baseline.      Cranial Nerves: No cranial nerve deficit.      Sensory: No sensory deficit.      Motor: No weakness.      Coordination: Coordination normal.      Gait: Gait normal.      Deep Tendon Reflexes: Reflexes normal.   Psychiatric:         Behavior: Behavior normal.         Thought Content: Thought content normal.         Procedures       Results for orders placed or performed during the hospital encounter of 07/25/24   High Sensitivity Troponin T    Specimen: Blood   Result Value Ref Range    HS Troponin T 33 (H) <22 ng/L   Comprehensive Metabolic Panel    Specimen: Blood   Result Value Ref Range    Glucose 98 65 - 99 mg/dL    BUN 22 (H) 6 - 20 mg/dL     Creatinine 1.19 0.76 - 1.27 mg/dL    Sodium 144 136 - 145 mmol/L    Potassium 4.2 3.5 - 5.2 mmol/L    Chloride 108 (H) 98 - 107 mmol/L    CO2 23.4 22.0 - 29.0 mmol/L    Calcium 9.2 8.6 - 10.5 mg/dL    Total Protein 7.1 6.0 - 8.5 g/dL    Albumin 3.9 3.5 - 5.2 g/dL    ALT (SGPT) 14 1 - 41 U/L    AST (SGOT) 18 1 - 40 U/L    Alkaline Phosphatase 113 39 - 117 U/L    Total Bilirubin 1.0 0.0 - 1.2 mg/dL    Globulin 3.2 gm/dL    A/G Ratio 1.2 g/dL    BUN/Creatinine Ratio 18.5 7.0 - 25.0    Anion Gap 12.6 5.0 - 15.0 mmol/L    eGFR 75.8 >60.0 mL/min/1.73   BNP    Specimen: Blood   Result Value Ref Range    proBNP 2,468.0 (H) 0.0 - 450.0 pg/mL   C-reactive Protein    Specimen: Blood   Result Value Ref Range    C-Reactive Protein 0.33 0.00 - 0.50 mg/dL   Protime-INR    Specimen: Blood   Result Value Ref Range    Protime 25.4 (H) 12.1 - 14.7 Seconds    INR 2.31 (H) 0.90 - 1.10   aPTT    Specimen: Blood   Result Value Ref Range    PTT 41.3 (H) 26.5 - 34.5 seconds   CBC Auto Differential    Specimen: Blood   Result Value Ref Range    WBC 7.55 3.40 - 10.80 10*3/mm3    RBC 5.40 4.14 - 5.80 10*6/mm3    Hemoglobin 16.0 13.0 - 17.7 g/dL    Hematocrit 50.6 37.5 - 51.0 %    MCV 93.7 79.0 - 97.0 fL    MCH 29.6 26.6 - 33.0 pg    MCHC 31.6 31.5 - 35.7 g/dL    RDW 15.5 (H) 12.3 - 15.4 %    RDW-SD 53.1 37.0 - 54.0 fl    MPV 10.4 6.0 - 12.0 fL    Platelets 151 140 - 450 10*3/mm3    Neutrophil % 69.8 42.7 - 76.0 %    Lymphocyte % 19.3 (L) 19.6 - 45.3 %    Monocyte % 6.9 5.0 - 12.0 %    Eosinophil % 2.8 0.3 - 6.2 %    Basophil % 0.7 0.0 - 1.5 %    Immature Grans % 0.5 0.0 - 0.5 %    Neutrophils, Absolute 5.27 1.70 - 7.00 10*3/mm3    Lymphocytes, Absolute 1.46 0.70 - 3.10 10*3/mm3    Monocytes, Absolute 0.52 0.10 - 0.90 10*3/mm3    Eosinophils, Absolute 0.21 0.00 - 0.40 10*3/mm3    Basophils, Absolute 0.05 0.00 - 0.20 10*3/mm3    Immature Grans, Absolute 0.04 0.00 - 0.05 10*3/mm3    nRBC 0.0 0.0 - 0.2 /100 WBC   Urine Drug Screen - Urine, Clean  Catch    Specimen: Urine, Clean Catch   Result Value Ref Range    THC, Screen, Urine Negative Negative    Phencyclidine (PCP), Urine Negative Negative    Cocaine Screen, Urine Negative Negative    Methamphetamine, Ur Negative Negative    Opiate Screen Negative Negative    Amphetamine Screen, Urine Negative Negative    Benzodiazepine Screen, Urine Negative Negative    Tricyclic Antidepressants Screen Negative Negative    Methadone Screen, Urine Negative Negative    Barbiturates Screen, Urine Negative Negative    Oxycodone Screen, Urine Negative Negative    Buprenorphine, Screen, Urine Negative Negative   Ethanol    Specimen: Blood   Result Value Ref Range    Ethanol <10 0 - 10 mg/dL    Ethanol % <0.010 %   Fentanyl, Urine - Urine, Clean Catch    Specimen: Urine, Clean Catch   Result Value Ref Range    Fentanyl, Urine Negative Negative   High Sensitivity Troponin T 2Hr    Specimen: Arm, Left; Blood   Result Value Ref Range    HS Troponin T 33 (H) <22 ng/L    Troponin T Delta 0 >=-4 - <+4 ng/L   ECG 12 Lead Syncope   Result Value Ref Range    QT Interval 438 ms    QTC Interval 440 ms   ECG 12 Lead Syncope   Result Value Ref Range    QT Interval 460 ms    QTC Interval 470 ms          ED Course  ED Course as of 07/25/24 1420   Thu Jul 25, 2024   1111 Initial EKG noted artifact.  Repeat EKG notes right bundle branch block.  No acute ST elevation.  Possible ST depression in the lateral leads.  QTc 470.  Electronically signed by Edy Rodriguez DO, 07/25/24, 11:12 AM EDT.   [SF]   1114 Patient refuses COVID-19 swab and ABG.  [MM]   1132 XR Chest 1 View  IMPRESSION:  1.  Cardiomegaly.  2.  Pulmonary vascular congestion.  3.  Coarsened.        This report was finalized on 7/25/2024 11:17 AM by Dr. Miguel Dillon MD.   [MM]   1338 CT Angiogram Chest Pulmonary Embolism  IMPRESSION:  1.  No pulmonary embolism is identified.  Some of the distal pulmonary  arteries cannot be evaluated due to suboptimal opacification.  2.  Patchy  bilateral groundglass attenuation suggesting of small airways  trapping.  3.  Cirrhotic nodular liver contour. Tiny ascites.        This report was finalized on 7/25/2024 1:34 PM by Dr. Miguel Dillon MD   [MM]   1418 Spoke with Dr. Rubio who has agreed to admit the patient.  [MM]      ED Course User Index  [MM] Adriana Whittington PA  [SF] Edy Rodriguez, DO                                             Medical Decision Making    This is a 49 year old male patient who presents to the ER with chief complaint of SOB. PMH significant for HTN, HLD, DM, NELY, history of PE anticoagulated on xarelto, CHF. He was sent from his cardiologist's office because he was experiencing significant SOB with exertion. He had a recent inpatient admission for NSTEMI from 07/13-07/16/24. He said he was SOB, dizziness and near-syncope. Denies chest pain.       Amount and/or Complexity of Data Reviewed  Labs: ordered. Decision-making details documented in ED Course.  Radiology: ordered. Decision-making details documented in ED Course.  ECG/medicine tests: ordered. Decision-making details documented in ED Course.    Risk  Prescription drug management.        Final diagnoses:   Dyspnea on exertion   Hypoxia       ED Disposition  ED Disposition       ED Disposition   Decision to Admit    Condition   --    Comment   --               No follow-up provider specified.       Medication List      No changes were made to your prescriptions during this visit.            Adriana Whittington PA  07/25/24 0167

## 2024-07-25 NOTE — CONSULTS
Date of Admit: 7/25/2024  Date of Consult: 07/25/24  No ref. provider found        Acute respiratory failure with hypoxia      Assessment      Acute on chronic HFpEF.  Nonobstructive coronary artery disease noted on recent CT coronary angiography.  COPD  History of pulm embolus, has been on Xarelto.  Obesity with possible obstructive sleep apnea.  Systemic hypertension  Type 2 diabetes mellitus      Recommendations     IV diuretics for now and monitor the kidney function and urine output closely and adjust diuretics accordingly  Continue to follow the troponin trend and consider further evaluation with cardiac catheterization if there is significant increase in the high-sensitivity troponin levels.  So far there have been flat.  Continue with Jardiance and lisinopril.    Reason for consultation:    Subjective       Subjective     Con Colón is a 49 y.o. male with problems as listed above presents with    History of Present Illness  Con Colón is a 49 y.o. male with a past medical history significant for nonobstructive coronary artery disease with recent CT coronary angiogram showing multivessel nonobstructive CAD with worst area being 50 to 70% stenosis in the left circumflex artery with total calcium score of 1200, HFpEF w EF of 66-70% (7/14/2024), NELY, Cardiomegaly, DMT2, HLD, HTN, COPD, history of PE anticoagulated with Xarelto and morbid obesity with a BMI of 53.     Patient [facility] to [facility] emergency room (ER) on 7/25/2024 with complaints of Dyspnea and unable to walk 10 yards.      Patient was seen in our office today by Kirill Grover PA-C or routine FU s/p recent hospitalization here at [facility] 7/13-7/16/2024 and was noted to be significantly short of breath on walking short distances.  EMS was called to bring him to the ER for further evaluation.    Admission labs reveal HS troponin 33-> 33. (Previous admission 26-32).  proBNP 2,468.0. Cr 1.19. Hgb 16.0 and Plt 151.     "  Cardiology has been consulted for further evaluation and management of his heart failure and for further advice     Primary Cardiologist has been JORI Moran and he was last seen in the office on 7/25/2024.      Patient was in room 302 . He reports he gets short of breath walking to his truck everyday and today was not different. He reports, \"I get short of breath like that all the time.\" He reports he quit smoking many years ago. He reports he weighed 349 the last time he weighed and today he weighed 380 and states, \" I have edema all the time and I have not noticed any more than usual\". He does report x 2 full urinals of UOP while in the ER today. He reports his last dose of Xarelto was this AM.  He denies any complaints of chest pain or discomfort.  His high sensitive troponin was mildly elevated at 33 and has been flat so far  EVENT TIMELINE:   7/25: Xarelto changed to IV Heparin per Dr. Rubio on admission. Last dose of Xarelto was this AM per patient.        Last Echo:  Adult Transthoracic Echo Complete w/ Color, Spectral and Contrast if necessary per protocol     Interpretation Summary    Left ventricular systolic function is normal. Calculated left ventricular EF = 69% Left ventricular ejection fraction appears to be 66 - 70%.    Left ventricular wall thickness is consistent with mild concentric hypertrophy.    The right ventricular cavity is moderate to severely dilated.    The left atrial cavity is mildly dilated.    Left atrial volume is mildly increased.    The right atrial cavity is severely  dilated.    Estimated right ventricular systolic pressure from tricuspid regurgitation is markedly elevated (>55 mmHg). Calculated right ventricular systolic pressure from tricuspid regurgitation is 55 mmHg.    The aortic root measures 3.7 cm.  Last Stress:   Results for orders placed during the hospital encounter of 07/13/24     Stress Test With Myocardial Perfusion One Day     Interpretation Summary    " Myocardial perfusion imaging indicates a normal myocardial perfusion study with no evidence of ischemia.    Left ventricular ejection fraction is normal (Calculated EF = 61%).    TID 1.12.    Findings consistent with a normal ECG stress test.    CT coronary angiogram on 7/18/2024 revealed:  FINDINGS:     Coronary Calcium Score (Agatson):  LM:    0  RCA: 413  PDA: 9  LAD: 558  LCX: 170  Other: 51     TOTAL: 1201     Coronary Angiography:     Left Main: The left main originates from the left coronary cusp. It is  normal in caliber with no calcified or soft plaque.     Left Anterior Descending: The LAD is patent. There is calcific plaque in  the proximal LAD producing a 25-49% stenosis (CAD RADS 2). The LAD gives  off 2 patent diagonal branches.     Ramus intermedius: Patent with no evidence of plaque or stenosis.     Left Circumflex: The LCX is patent. There is calcific plaque in the mid  circumflex which produces a 50-69% stenosis (CAD RADS 3). The LCX gives  off 2 patent obtuse marginal branches.     Right Coronary Artery: The RCA is patent. There is calcific plaque in  the proximal and mid RCA producing a 1-24% stenosis (CAD RADS 1)  proximally and a 25-49% stenosis (CAD RADS 2) in the mid portions. The  RCA terminates as a PDA and right posterolateral branch.     Left Atrium: The left atrium is normal in size with no filling defects.     Left Ventricle: The ventricular cavity size is within normal limits.  There are no stigmata of prior infarction. There are no filling defects.  There is mild ventricular wall thickening.     Right atrium: Moderate to severe dilatation.     Right ventricle: Moderate to severe dilatation.     Pulmonary Arteries: Normal with no filling defects.     Pulmonary Veins: Normal venous drainage.     Pericardium: Normal thickness with no significant effusion.     Cardiac Valves: There is no thickening or calcifications involving the  aortic or mitral valves.     Aorta: Normal in caliber with  no evidence of dissection.     Lungs and Mediastinum: There is no evidence of a mediastinal mass or  adenopathy. The lungs are clear with no focal opacities or suspicious  pulmonary nodules.     IMPRESSION:  1. Calcific plaque in the mid circumflex producing a 50-69% stenosis  (CAD RADS 3).  2. Calcific plaque in the proximal LAD and mid RCA producing a 25-49%  stenosis (CAD RADS 2).  3. Calcific plaque in the proximal RCA producing a 1-24% stenosis (CAD  RADS 1).  4. Coronary artery calcium score of 1201 which is in the   percentile for a patient of this age.  5. Mild wall thickening involving the left ventricle.  6. Moderate to severe dilatation of the right atrium and ventricle..     This report was finalized on 7/18/2024 3:40 PM by Laura Silver M.D..           Past Medical History:   Diagnosis Date    Cardiomegaly     COPD (chronic obstructive pulmonary disease)     Hypertension     Pulmonary embolism     PAST    Sleep apnea      History reviewed. No pertinent surgical history.  Family History   Problem Relation Age of Onset    Diabetes Other      Social History     Tobacco Use    Smoking status: Former    Smokeless tobacco: Current     Types: Chew    Tobacco comments:     2019 QUIT   Vaping Use    Vaping status: Never Used    Passive vaping exposure: Yes   Substance Use Topics    Alcohol use: Never    Drug use: Never     Medications Prior to Admission   Medication Sig Dispense Refill Last Dose    albuterol sulfate  (90 Base) MCG/ACT inhaler Inhale 2 puffs Every 6 (Six) Hours As Needed for Wheezing or Shortness of Air (COPD).   7/25/2024    aspirin 81 MG EC tablet Take 1 tablet by mouth Daily. 30 tablet 0 7/25/2024    atorvastatin (LIPITOR) 40 MG tablet Take 1 tablet by mouth Every Night. 30 tablet 0 7/24/2024    budesonide-formoterol (SYMBICORT) 160-4.5 MCG/ACT inhaler Inhale 2 puffs 2 (Two) Times a Day As Needed (COPD).   7/25/2024    cetirizine (zyrTEC) 10 MG tablet Take 1 tablet by mouth  Daily.   7/25/2024    empagliflozin (JARDIANCE) 10 MG tablet tablet Take 1 tablet by mouth Daily. 30 tablet 1 7/25/2024    furosemide (LASIX) 40 MG tablet Take 1 tablet by mouth Daily.   7/25/2024    lisinopril (PRINIVIL,ZESTRIL) 40 MG tablet TAKE 1 TABLET BY MOUTH DAILY 90 tablet 0 7/25/2024    metFORMIN (GLUCOPHAGE) 1000 MG tablet Take 1 tablet by mouth 2 (Two) Times a Day With Meals.   7/25/2024    metoprolol tartrate (LOPRESSOR) 25 MG tablet Take 1 tablet by mouth Every 12 (Twelve) Hours. 60 tablet 0 7/25/2024    rivaroxaban (XARELTO) 20 MG tablet Take 1 tablet by mouth Daily With Dinner.   7/25/2024    cholecalciferol (VITAMIN D3) 1.25 MG (95915 UT) capsule Take 1 capsule by mouth Every 7 (Seven) Days.   not picked up yet     Allergies:  Nuvigil [armodafinil] and Provigil [modafinil]    Review of Systems   Constitutional:  Negative for appetite change, chills and fever.   HENT:  Negative for congestion, ear discharge, ear pain and sore throat.    Eyes:  Negative for pain and redness.   Respiratory:  Positive for shortness of breath. Negative for cough and wheezing.    Cardiovascular:  Positive for leg swelling. Negative for palpitations.   Gastrointestinal:  Negative for abdominal pain, diarrhea, nausea and vomiting.   Endocrine: Negative for cold intolerance, heat intolerance, polydipsia and polyuria.   Genitourinary:  Negative for dysuria and hematuria.   Musculoskeletal:  Positive for back pain.   Skin:  Negative for rash.   Allergic/Immunologic: Negative.    Neurological:  Negative for seizures, syncope and headaches.   Hematological: Negative.    Psychiatric/Behavioral:  Negative for agitation and confusion. The patient is not nervous/anxious.        Objective       Objective      Vital Signs  Temp:  [97.8 °F (36.6 °C)-98.8 °F (37.1 °C)] 97.8 °F (36.6 °C)  Heart Rate:  [60-74] 74  Resp:  [16] 16  BP: ()/(68-88) 122/79  Vital Signs (last 72 hrs)         07/22 0700  07/23 0659 07/23 0700  07/24 0659  07/24 0700  07/25 0659 07/25 0700  07/25 1803   Most Recent      Temp (°F)       97.8 -  98.8     97.8 (36.6) 07/25 1508    Heart Rate       60 -  74     74 07/25 1508    Resp         16     16 07/25 1508    BP       92/68 -  123/82     122/79 07/25 1508    SpO2 (%)       87 -  92     92 07/25 1508    Flow (L/min)         2     2  Comment: per provider verbal order 07/25 1350          Body mass index is 53.02 kg/m².  Documented weights    07/25/24 1015 07/25/24 1508   Weight: (!) 172 kg (380 lb) (!) 172 kg (380 lb)            Intake/Output Summary (Last 24 hours) at 7/25/2024 1806  Last data filed at 7/25/2024 1700  Gross per 24 hour   Intake 440 ml   Output --   Net 440 ml     Physical Exam  Constitutional:       Appearance: He is well-developed.      Comments: Pleasant middle-age gentleman who is morbidly obese, alert, oriented x 3 and is in no apparent distress at this time sitting in bed.   HENT:      Head: Normocephalic and atraumatic.   Eyes:      General:         Right eye: No discharge.         Left eye: No discharge.   Neck:      Thyroid: No thyromegaly.      Vascular: JVD present.      Trachea: No tracheal deviation.   Cardiovascular:      Chest Wall: PMI is not displaced.      Pulses:           Radial pulses are 2+ on the right side and 2+ on the left side.        Popliteal pulses are 1+ on the right side and 1+ on the left side.        Dorsalis pedis pulses are 1+ on the right side and 1+ on the left side.        Posterior tibial pulses are 2+ on the right side and 2+ on the left side.      Heart sounds: S1 normal and S2 normal. No murmur heard.     No friction rub. No gallop.   Pulmonary:      Effort: No respiratory distress.      Breath sounds: No stridor. Rales (Rales at both bases) present. No wheezing.   Chest:      Chest wall: No tenderness.   Abdominal:      General: There is no distension.      Palpations: Abdomen is soft. There is no mass.      Tenderness: There is no abdominal tenderness. There  is no guarding.   Musculoskeletal:      Right lower leg: Edema present.      Left lower le+ Edema present.   Skin:     General: Skin is warm and dry.      Findings: No erythema.   Neurological:      General: No focal deficit present.      Mental Status: He is alert.   Psychiatric:         Mood and Affect: Mood normal.         Thought Content: Thought content normal.         Judgment: Judgment normal.             Results review     Results Review:    I reviewed the patient's new clinical results.  Results from last 7 days   Lab Units 24  1247 24  1038   HSTROP T ng/L 33* 33*     Results from last 7 days   Lab Units 24  1038   WBC 10*3/mm3 7.55   HEMOGLOBIN g/dL 16.0   PLATELETS 10*3/mm3 151     Results from last 7 days   Lab Units 24  1038   SODIUM mmol/L 144   POTASSIUM mmol/L 4.2   CHLORIDE mmol/L 108*   CO2 mmol/L 23.4   BUN mg/dL 22*   CREATININE mg/dL 1.19   CALCIUM mg/dL 9.2   GLUCOSE mg/dL 98   ALT (SGPT) U/L 14   AST (SGOT) U/L 18     Lab Results   Component Value Date    INR 2.31 (H) 2024    INR 1.33 (H) 2024    INR 1.02 (L) 2020    INR 1.37 (L) 2020    INR 1.21 (L) 2019    INR 1.21 (L) 2019    INR 1.21 (L) 12/10/2019     Lab Results   Component Value Date    MG 1.92 2019    MG 1.95 2019     Lab Results   Component Value Date    TSH 1.630 2022    TRIG 49 2022    HDL 32 (L) 2022    LDL 77 2022      Lab Results   Component Value Date    BNP 1152 (H) 2021       ECG         ECG/EMG Results (last 24 hours)       Procedure Component Value Units Date/Time    ECG 12 Lead Syncope [[id number]] Collected: 24 1028     Updated: 24 1319     QT Interval 438 ms      QTC Interval 440 ms     Narrative:      Test Reason : Syncope  Blood Pressure :   */*   mmHG  Vent. Rate :  61 BPM     Atrial Rate :  61 BPM     P-R Int : 200 ms          QRS Dur : 104 ms      QT Int : 438 ms       P-R-T Axes :  77 216 856  degrees     QTc Int : 440 ms      Normal sinus rhythm  Possible Lateral infarct (cited on or before 25-JUL-2024)  Inferior-posterior infarct , possibly acute  Right bundle branch block -or- Right ventricular hypertrophy    Abnormal ECG  When compared with ECG of 18-JUL-2024 03:52,    No significant change was found  Confirmed by Keith Duval ([id number]) on 7/25/2024 1:19:07 PM    Referred By: KIANNA           Confirmed By: Keith Duval    ECG 12 Lead Syncope [[id number]] Collected: 07/25/24 1034     Updated: 07/25/24 1319     QT Interval 460 ms      QTC Interval 470 ms     Narrative:      Test Reason : Syncope  Blood Pressure :   */*   mmHG  Vent. Rate :  63 BPM     Atrial Rate :  63 BPM     P-R Int : 160 ms          QRS Dur : 122 ms      QT Int : 460 ms       P-R-T Axes : 100 269 -67 degrees     QTc Int : 470 ms      Normal sinus rhythm  Possible Left atrial enlargement  Right bundle branch block  Possible Lateral infarct (cited on or before 25-JUL-2024)  T wave abnormality, consider inferior ischemia  Abnormal ECG  When compared with ECG of 25-JUL-2024 10:28, (Unconfirmed)  No significant change was found  Confirmed by Keith Duval ([id number]) on 7/25/2024 1:19:43 PM    Referred By: KIANNA           Confirmed By: Keith Duval            Imaging Results (Last 72 Hours)       Procedure Component Value Units Date/Time    CT Angiogram Chest Pulmonary Embolism [[id number]] Collected: 07/25/24 1334     Updated: 07/25/24 1337    Narrative:      EXAM:    CT Angiography Chest With Intravenous Contrast     EXAM DATE:    7/25/2024 1:21 PM     CLINICAL HISTORY:    SOB, chest pain     TECHNIQUE:    Axial computed tomographic angiography images of the chest with  intravenous contrast.  This CT exam was performed using one or more of  the following dose reduction techniques:  automated exposure control,  adjustment of the mA and/or kV according to patient size, and/or use of  iterative reconstruction technique.    MIP  reconstructed images were created and reviewed.     COMPARISON:    No relevant prior studies available.     FINDINGS:    Limitations:  Suboptimal opacification of the pulmonary arteries.    Pulmonary arteries:  No pulmonary embolism is identified.  Some of the  distal pulmonary arteries cannot be evaluated due to suboptimal  opacification.    Aorta:  No acute findings.  No thoracic aortic aneurysm.    Lungs and pleural spaces:  Patchy bilateral groundglass attenuation  suggesting of small airways trapping.  No mass.  No consolidation.  No  significant effusion.    Heart:  Unremarkable as visualized.  No cardiomegaly.  No significant  pericardial effusion.  No evidence of RV dysfunction.    Bones/joints:  No acute fracture.  No dislocation.    Soft tissues:  Unremarkable as visualized.    Lymph nodes:  Unremarkable as visualized.  No enlarged lymph nodes.    Liver:  Cirrhotic nodular liver contour. Tiny ascites.       Impression:      1.  No pulmonary embolism is identified.  Some of the distal pulmonary  arteries cannot be evaluated due to suboptimal opacification.  2.  Patchy bilateral groundglass attenuation suggesting of small airways  trapping.  3.  Cirrhotic nodular liver contour. Tiny ascites.        This report was finalized on 7/25/2024 1:34 PM by Dr. Miguel Dillon MD.       XR Chest 1 View [[id number]] Collected: 07/25/24 1115     Updated: 07/25/24 1120    Narrative:      EXAM:    XR Chest, 1 View     EXAM DATE:    7/25/2024 10:44 AM     CLINICAL HISTORY:    chest pain     TECHNIQUE:    Frontal view of the chest.     COMPARISON:    7/18/2024     FINDINGS:    LUNGS AND PLEURAL SPACES:  Pulmonary vascular congestion.  No  consolidation.  No pneumothorax.    HEART:  Cardiomegaly.    MEDIASTINUM:  Unremarkable as visualized.  Normal mediastinal contour.    BONES/JOINTS:  Unremarkable as visualized.  No acute fracture.    OTHER FINDINGS:  Coarsened.       Impression:      1.  Cardiomegaly.  2.  Pulmonary vascular  congestion.  3.  Coarsened.        This report was finalized on 7/25/2024 11:17 AM by Dr. Miguel Dillon MD.               I have discussed my impression and recommendations with the patient and Dr. Rubio    Thank you very much for asking us to be involved in this patient's care.  We will follow along with you.      Micheal Chang MD,Located within [facility]  07/25/24  18:06 EDT    Please note that portions of this note were completed with a voice recognition program.

## 2024-07-25 NOTE — SIGNIFICANT NOTE
"Patient refused ABG.  Patient stated that he has had one before and that \"they hurt\" and that the last one he had, the person obtaining it \"drove it to the bone\" as he pointed to his right wrist.  ER NP made aware.  "

## 2024-07-25 NOTE — H&P
[facility]    Patient Identification:  Name:  Con Colón  Age:  49 y.o.  Sex:  male  :  1975  MRN:  [id number]   Visit Number:  [id number]  Admit Date: 2024   Room number:  3302/1S  Primary Care Physician:  Halie Dubois APRN     Subjective     Chief complaint:    Chief Complaint   Patient presents with    Syncope    Shortness of Breath       History of presenting illness:  49 y.o. male with hx of DM II, HTN, HLD, PE, HFpEF, NELY who presented with exertional dyspnea and near syncope. Patient denies any new symptoms. He notes he has been getting dizzy after taking his meds and exertional dyspnea for a long time. He was sent from cardiology clinic for these symptoms. Patient hypoxic in ED in upper 80's initially, placed on 2L NC but then refused noting he did not need it. He was saturating in low 90's on my evaluation eating dinner. He also refused COVID screen and ABG. Patient was recently admitted earlier this month for chest pain, NSTEMI. Stress test not abnormal but LHC attempted but patient did not tolerate laying flat. He has since had abnormal CTA. Patient noted he would be willing to try LHC again if recommended by cardiology. He denied any chest pain or change in symptoms recently.   ---------------------------------------------------------------------------------------------------------------------   Review of Systems   Constitutional: Negative.    HENT: Negative.     Eyes: Negative.    Respiratory: Negative.     Cardiovascular: Negative.    Gastrointestinal: Negative.    Endocrine: Negative.    Genitourinary: Negative.    Musculoskeletal: Negative.    Skin: Negative.    Allergic/Immunologic: Negative.    Neurological:  Positive for dizziness.   Hematological: Negative.    Psychiatric/Behavioral: Negative.       ---------------------------------------------------------------------------------------------------------------------   Past  Medical History:   Diagnosis Date    Cardiomegaly     COPD (chronic obstructive pulmonary disease)     Hypertension     Pulmonary embolism     PAST    Sleep apnea      History reviewed. No pertinent surgical history.  Family History   Problem Relation Age of Onset    Diabetes Other      Social History     Socioeconomic History    Marital status:    Tobacco Use    Smoking status: Former    Smokeless tobacco: Current     Types: Chew    Tobacco comments:     2019 QUIT   Vaping Use    Vaping status: Never Used    Passive vaping exposure: Yes   Substance and Sexual Activity    Alcohol use: Never    Drug use: Never    Sexual activity: Defer     ---------------------------------------------------------------------------------------------------------------------   Allergies:  Nuvigil [armodafinil] and Provigil [modafinil]  ---------------------------------------------------------------------------------------------------------------------   Medications below are reported home medications pulling from within the system; at this time, these medications have not been reconciled unless otherwise specified and are in the verification process for further verifcation as current home medications.    Prior to Admission Medications       Prescriptions Last Dose Informant Patient Reported? Taking?    albuterol sulfate  (90 Base) MCG/ACT inhaler 7/25/2024 Self, Other Yes Yes    Inhale 2 puffs Every 6 (Six) Hours As Needed for Wheezing or Shortness of Air (COPD).    aspirin 81 MG EC tablet 7/25/2024  No Yes    Take 1 tablet by mouth Daily.    atorvastatin (LIPITOR) 40 MG tablet 7/24/2024  No Yes    Take 1 tablet by mouth Every Night.    budesonide-formoterol (SYMBICORT) 160-4.5 MCG/ACT inhaler 7/25/2024 Self, Other Yes Yes    Inhale 2 puffs 2 (Two) Times a Day As Needed (COPD).    cetirizine (zyrTEC) 10 MG tablet 7/25/2024 Self, Other Yes Yes    Take 1 tablet by mouth Daily.    cholecalciferol (VITAMIN D3) 1.25 MG (39089  UT) capsule not picked up yet Self, Pharmacy Yes No    Take 1 capsule by mouth Every 7 (Seven) Days.    empagliflozin (JARDIANCE) 10 MG tablet tablet 7/25/2024  No Yes    Take 1 tablet by mouth Daily.    furosemide (LASIX) 40 MG tablet 7/25/2024 Self, Other Yes Yes    Take 1 tablet by mouth Daily.    lisinopril (PRINIVIL,ZESTRIL) 40 MG tablet 7/25/2024 Self, Other No Yes    TAKE 1 TABLET BY MOUTH DAILY    metFORMIN (GLUCOPHAGE) 1000 MG tablet 7/25/2024 Self, Other Yes Yes    Take 1 tablet by mouth 2 (Two) Times a Day With Meals.    metoprolol tartrate (LOPRESSOR) 25 MG tablet 7/25/2024  No Yes    Take 1 tablet by mouth Every 12 (Twelve) Hours.    rivaroxaban (XARELTO) 20 MG tablet 7/25/2024 Self Yes Yes    Take 1 tablet by mouth Daily With Dinner.          Objective     Vital Signs:  Temp:  [97.8 °F (36.6 °C)-98.8 °F (37.1 °C)] 97.8 °F (36.6 °C)  Heart Rate:  [4-67] 4  Resp:  [16] 16  BP: ()/(68-88) 122/79    Mean Arterial Pressure (Non-Invasive) for the past 24 hrs (Last 3 readings):   Noninvasive MAP (mmHg)   07/25/24 1508 92   07/25/24 1445 78   07/25/24 1430 91     SpO2:  [87 %-92 %] 92 %  on  Flow (L/min):  [2] 2;   Device (Oxygen Therapy): room air  Body mass index is 53.02 kg/m².    Wt Readings from Last 3 Encounters:   07/25/24 (!) 172 kg (380 lb)   07/18/24 (!) 172 kg (379 lb 3.1 oz)   07/16/24 (!) 172 kg (379 lb 13.6 oz)      ---------------------------------------------------------------------------------------------------------------------   Physical Exam:  Constitutional:  Well-developed and well-nourished.  No respiratory distress. Obese.      HENT:  Head: Normocephalic and atraumatic.  Mouth:  Moist mucous membranes.    Eyes:  Conjunctivae and EOM are normal.  Pupils are equal, round, and reactive to light.  No scleral icterus.  Cardiovascular:  Normal rate, regular rhythm and normal heart sounds with no murmur.  Pulmonary/Chest:  No respiratory distress, no wheezes, no crackles, with normal  breath sounds and good air movement.  Abdominal:  Soft.  Bowel sounds are normal.  No distension and no tenderness.   Musculoskeletal:  No edema, no tenderness, and no deformity.  No red or swollen joints anywhere.    Neurological:  Alert and oriented to person, place, and time.  No cranial nerve deficit.  No focal neurological deficit.   Skin:  Skin is warm and dry.  No rash noted.  No pallor.   Peripheral vascular:  No edema and strong pulses on all 4 extremities.    ---------------------------------------------------------------------------------------------------------------------  EKG:    ECG 12 Lead Syncope   Final Result   Test Reason : Syncope   Blood Pressure :   */*   mmHG   Vent. Rate :  63 BPM     Atrial Rate :  63 BPM      P-R Int : 160 ms          QRS Dur : 122 ms       QT Int : 460 ms       P-R-T Axes : 100 269 -67 degrees      QTc Int : 470 ms         Normal sinus rhythm   Possible Left atrial enlargement   Right bundle branch block   Possible Lateral infarct (cited on or before 25-JUL-2024)   T wave abnormality, consider inferior ischemia   Abnormal ECG   When compared with ECG of 25-JUL-2024 10:28, (Unconfirmed)   No significant change was found   Confirmed by Keith Duval ([id number]) on 7/25/2024 1:19:43 PM      Referred By: FORD           Confirmed By: Keith Duval      ECG 12 Lead Syncope   Final Result   Test Reason : Syncope   Blood Pressure :   */*   mmHG   Vent. Rate :  61 BPM     Atrial Rate :  61 BPM      P-R Int : 200 ms          QRS Dur : 104 ms       QT Int : 438 ms       P-R-T Axes :  77 216 237 degrees      QTc Int : 440 ms         Normal sinus rhythm   Possible Lateral infarct (cited on or before 25-JUL-2024)   Inferior-posterior infarct , possibly acute   Right bundle branch block -or- Right ventricular hypertrophy      Abnormal ECG   When compared with ECG of 18-JUL-2024 03:52,      No significant change was found   Confirmed by Keith Duval ([id number]) on 7/25/2024 1:19:07 PM  "     Referred By: FORD           Confirmed By: Keith Duval          Telemetry:      I have personally looked at both the EKG and the telemetry strips.    Last echocardiogram:  Results for orders placed during the hospital encounter of 07/13/24    Adult Transthoracic Echo Complete w/ Color, Spectral and Contrast if necessary per protocol    Interpretation Summary    Left ventricular systolic function is normal. Calculated left ventricular EF = 69% Left ventricular ejection fraction appears to be 66 - 70%.    Left ventricular wall thickness is consistent with mild concentric hypertrophy.    The right ventricular cavity is moderate to severely dilated.    The left atrial cavity is mildly dilated.    Left atrial volume is mildly increased.    The right atrial cavity is severely  dilated.    Estimated right ventricular systolic pressure from tricuspid regurgitation is markedly elevated (>55 mmHg). Calculated right ventricular systolic pressure from tricuspid regurgitation is 55 mmHg.    The aortic root measures 3.7 cm.    --------------------------------------------------------------------------------------------------------------------  Labs:  Results from last 7 days   Lab Units 07/25/24  1038   CRP mg/dL 0.33   WBC 10*3/mm3 7.55   HEMOGLOBIN g/dL 16.0   HEMATOCRIT % 50.6   MCV fL 93.7   MCHC g/dL 31.6   PLATELETS 10*3/mm3 151   INR  2.31*         Results from last 7 days   Lab Units 07/25/24  1038   SODIUM mmol/L 144   POTASSIUM mmol/L 4.2   CHLORIDE mmol/L 108*   CO2 mmol/L 23.4   BUN mg/dL 22*   CREATININE mg/dL 1.19   CALCIUM mg/dL 9.2   GLUCOSE mg/dL 98   ALBUMIN g/dL 3.9   BILIRUBIN mg/dL 1.0   ALK PHOS U/L 113   AST (SGOT) U/L 18   ALT (SGPT) U/L 14   Estimated Creatinine Clearance: 123.7 mL/min (by C-G formula based on SCr of 1.19 mg/dL).    No results found for: \"AMMONIA\"  Results from last 7 days   Lab Units 07/25/24  1247 07/25/24  1038   HSTROP T ng/L 33* 33*   PROBNP pg/mL  --  2,468.0*         No " "results found for: \"HGBA1C\", \"POCGLU\"  Lab Results   Component Value Date    TSH 1.630 01/26/2022     No results found for: \"PREGTESTUR\", \"PREGSERUM\", \"HCG\", \"HCGQUANT\"  Pain Management Panel          Latest Ref Rng & Units 7/25/2024   Pain Management Panel   Amphetamine, Urine Qual Negative Negative    Barbiturates Screen, Urine Negative Negative    Benzodiazepine Screen, Urine Negative Negative    Buprenorphine, Screen, Urine Negative Negative    Cocaine Screen, Urine Negative Negative    Fentanyl, Urine Negative Negative    Methadone Screen , Urine Negative Negative    Methamphetamine, Ur Negative Negative      Brief Urine Lab Results       None          No results found for: \"BLOODCX\"  No results found for: \"URINECX\"  No results found for: \"WOUNDCX\"  No results found for: \"STOOLCX\"    I have personally looked at the labs and they are summarized above.  ----------------------------------------------------------------------------------------------------------------------  Detailed radiology reports for the last 24 hours:    Imaging Results (Last 24 Hours)       Procedure Component Value Units Date/Time    CT Angiogram Chest Pulmonary Embolism [[id number]] Collected: 07/25/24 1334     Updated: 07/25/24 1337    Narrative:      EXAM:    CT Angiography Chest With Intravenous Contrast     EXAM DATE:    7/25/2024 1:21 PM     CLINICAL HISTORY:    SOB, chest pain     TECHNIQUE:    Axial computed tomographic angiography images of the chest with  intravenous contrast.  This CT exam was performed using one or more of  the following dose reduction techniques:  automated exposure control,  adjustment of the mA and/or kV according to patient size, and/or use of  iterative reconstruction technique.    MIP reconstructed images were created and reviewed.     COMPARISON:    No relevant prior studies available.     FINDINGS:    Limitations:  Suboptimal opacification of the pulmonary arteries.    Pulmonary arteries:  No pulmonary " embolism is identified.  Some of the  distal pulmonary arteries cannot be evaluated due to suboptimal  opacification.    Aorta:  No acute findings.  No thoracic aortic aneurysm.    Lungs and pleural spaces:  Patchy bilateral groundglass attenuation  suggesting of small airways trapping.  No mass.  No consolidation.  No  significant effusion.    Heart:  Unremarkable as visualized.  No cardiomegaly.  No significant  pericardial effusion.  No evidence of RV dysfunction.    Bones/joints:  No acute fracture.  No dislocation.    Soft tissues:  Unremarkable as visualized.    Lymph nodes:  Unremarkable as visualized.  No enlarged lymph nodes.    Liver:  Cirrhotic nodular liver contour. Tiny ascites.       Impression:      1.  No pulmonary embolism is identified.  Some of the distal pulmonary  arteries cannot be evaluated due to suboptimal opacification.  2.  Patchy bilateral groundglass attenuation suggesting of small airways  trapping.  3.  Cirrhotic nodular liver contour. Tiny ascites.        This report was finalized on 7/25/2024 1:34 PM by Dr. Miguel Dillon MD.       XR Chest 1 View [[id number]] Collected: 07/25/24 1115     Updated: 07/25/24 1120    Narrative:      EXAM:    XR Chest, 1 View     EXAM DATE:    7/25/2024 10:44 AM     CLINICAL HISTORY:    chest pain     TECHNIQUE:    Frontal view of the chest.     COMPARISON:    7/18/2024     FINDINGS:    LUNGS AND PLEURAL SPACES:  Pulmonary vascular congestion.  No  consolidation.  No pneumothorax.    HEART:  Cardiomegaly.    MEDIASTINUM:  Unremarkable as visualized.  Normal mediastinal contour.    BONES/JOINTS:  Unremarkable as visualized.  No acute fracture.    OTHER FINDINGS:  Coarsened.       Impression:      1.  Cardiomegaly.  2.  Pulmonary vascular congestion.  3.  Coarsened.        This report was finalized on 7/25/2024 11:17 AM by Dr. Miguel Dillon MD.             Final impressions for the last 30 days of radiology reports:    CT Angiogram Chest Pulmonary  Embolism    Result Date: 7/25/2024  1.  No pulmonary embolism is identified.  Some of the distal pulmonary arteries cannot be evaluated due to suboptimal opacification. 2.  Patchy bilateral groundglass attenuation suggesting of small airways trapping. 3.  Cirrhotic nodular liver contour. Tiny ascites.   This report was finalized on 7/25/2024 1:34 PM by Dr. Miguel Dillon MD.      XR Chest 1 View    Result Date: 7/25/2024  1.  Cardiomegaly. 2.  Pulmonary vascular congestion. 3.  Coarsened.   This report was finalized on 7/25/2024 11:17 AM by Dr. Miguel Dillon MD.      CT Angiogram Heart With 3D Image    Result Date: 7/18/2024  1. Calcific plaque in the mid circumflex producing a 50-69% stenosis (CAD RADS 3). 2. Calcific plaque in the proximal LAD and mid RCA producing a 25-49% stenosis (CAD RADS 2). 3. Calcific plaque in the proximal RCA producing a 1-24% stenosis (CAD RADS 1). 4. Coronary artery calcium score of 1201 which is in the  percentile for a patient of this age. 5. Mild wall thickening involving the left ventricle. 6. Moderate to severe dilatation of the right atrium and ventricle..  This report was finalized on 7/18/2024 3:40 PM by Laura Silver M.D..      XR Chest 1 View    Result Date: 7/18/2024  No acute cardiopulmonary process.   This report was finalized on 7/18/2024 10:55 AM by Laura Silver M.D..      US Venous Doppler Upper Extremity Left (duplex)    Result Date: 7/18/2024   1.  No DVT in the left upper extremity. 2.  No SVT in the left upper extremity. 3.  Probable ganglion cyst near the wrist measures 2.0 x 1.2 cm.  This report was finalized on 7/18/2024 5:55 AM by Rk Guerrero MD.      US Venous Doppler Lower Extremity Bilateral (duplex)    Result Date: 7/15/2024  No DVT in the lower extremities on today's exam.  This report was finalized on 7/15/2024 8:09 PM by Dr. Francis Hummel MD.      CT Upper Extremity Left Without Contrast    Result Date: 7/15/2024  4.3 x 1.0 cm  subcutaneous inflammatory stranding in the proximal forearm which may be due to a hematoma.     This report was finalized on 7/15/2024 4:05 PM by Laura Silver M.D..     I have personally looked at the radiology images and read the final radiology report.    Assessment & Plan       #Hypoxia on presentation   #Exertional dyspnea  #HFpEF  #NELY  #Suspected obesity hypoventilation   - No longer hypoxic and refusing supplemental O2 or ABG  - ProBNP elevated and mild overload on plain film of chest, will give 40 mg IV lasix and monitor response.   - Continue nightly CPAP  - Monitor on tele with continuous pulse oximetry     #Recent NSTEMI, normal stress test, abnormal CTA  #Chronic troponin elevation   - Troponin 33=>33. Denies chest pain. No significant change on EKG.   - Cardiology consulted for recommendations on further ischemic w/u.     #DM II  - SSI and titrate as needed     #HTN  #HLD  - Restart home regimen     #Hx of PE  - Stop home xarelto, heparin gtt until we confirm LHC will not be needed this admission.     Code status: Full     Dispo: Admit to tele     Davey Rubio MD  [facility]  07/25/24  16:49 EDT

## 2024-07-25 NOTE — ED NOTES
Attempted to call and give report. I was informed that the nurse is discharging a patient and that they would call back in 5 minutes for report.

## 2024-07-26 ENCOUNTER — APPOINTMENT (OUTPATIENT)
Dept: CARDIOLOGY | Facility: HOSPITAL | Age: 49
End: 2024-07-26
Payer: MEDICAID

## 2024-07-26 LAB
ABSOLUTE LUNG FLUID CONTENT: 35 % (ref 20–35)
ANION GAP SERPL CALCULATED.3IONS-SCNC: 11.4 MMOL/L (ref 5–15)
BASOPHILS # BLD AUTO: 0.06 10*3/MM3 (ref 0–0.2)
BASOPHILS NFR BLD AUTO: 0.8 % (ref 0–1.5)
BH CV ECHO MEAS - EDV(CUBED): 97.7 ML
BH CV ECHO MEAS - EDV(MOD-SP4): 43.7 ML
BH CV ECHO MEAS - EF(MOD-SP4): 78.5 %
BH CV ECHO MEAS - ESV(CUBED): 33.5 ML
BH CV ECHO MEAS - ESV(MOD-SP4): 9.4 ML
BH CV ECHO MEAS - FS: 30 %
BH CV ECHO MEAS - IVS/LVPW: 1.02 CM
BH CV ECHO MEAS - IVSD: 1.53 CM
BH CV ECHO MEAS - LA DIMENSION: 3.3 CM
BH CV ECHO MEAS - LV DIASTOLIC VOL/BSA (35-75): 15.8 CM2
BH CV ECHO MEAS - LV MASS(C)D: 287.5 GRAMS
BH CV ECHO MEAS - LV SYSTOLIC VOL/BSA (12-30): 3.4 CM2
BH CV ECHO MEAS - LVIDD: 4.6 CM
BH CV ECHO MEAS - LVIDS: 3.2 CM
BH CV ECHO MEAS - LVPWD: 1.49 CM
BH CV ECHO MEAS - SV(MOD-SP4): 34.3 ML
BH CV ECHO MEAS - SVI(MOD-SP4): 12.4 ML/M2
BUN SERPL-MCNC: 21 MG/DL (ref 6–20)
BUN/CREAT SERPL: 19.3 (ref 7–25)
CALCIUM SPEC-SCNC: 9.1 MG/DL (ref 8.6–10.5)
CHLORIDE SERPL-SCNC: 104 MMOL/L (ref 98–107)
CO2 SERPL-SCNC: 20.6 MMOL/L (ref 22–29)
CREAT SERPL-MCNC: 1.09 MG/DL (ref 0.76–1.27)
DEPRECATED RDW RBC AUTO: 52.3 FL (ref 37–54)
EGFRCR SERPLBLD CKD-EPI 2021: 84.2 ML/MIN/1.73
EOSINOPHIL # BLD AUTO: 0.28 10*3/MM3 (ref 0–0.4)
EOSINOPHIL NFR BLD AUTO: 3.9 % (ref 0.3–6.2)
ERYTHROCYTE [DISTWIDTH] IN BLOOD BY AUTOMATED COUNT: 15.1 % (ref 12.3–15.4)
GLUCOSE BLDC GLUCOMTR-MCNC: 102 MG/DL (ref 70–130)
GLUCOSE BLDC GLUCOMTR-MCNC: 106 MG/DL (ref 70–130)
GLUCOSE BLDC GLUCOMTR-MCNC: 140 MG/DL (ref 70–130)
GLUCOSE BLDC GLUCOMTR-MCNC: 86 MG/DL (ref 70–130)
GLUCOSE SERPL-MCNC: 125 MG/DL (ref 65–99)
HCT VFR BLD AUTO: 48.8 % (ref 37.5–51)
HGB BLD-MCNC: 15.4 G/DL (ref 13–17.7)
IMM GRANULOCYTES # BLD AUTO: 0.04 10*3/MM3 (ref 0–0.05)
IMM GRANULOCYTES NFR BLD AUTO: 0.6 % (ref 0–0.5)
LYMPHOCYTES # BLD AUTO: 1.43 10*3/MM3 (ref 0.7–3.1)
LYMPHOCYTES NFR BLD AUTO: 19.9 % (ref 19.6–45.3)
MAGNESIUM SERPL-MCNC: 2 MG/DL (ref 1.6–2.6)
MCH RBC QN AUTO: 29.7 PG (ref 26.6–33)
MCHC RBC AUTO-ENTMCNC: 31.6 G/DL (ref 31.5–35.7)
MCV RBC AUTO: 94.2 FL (ref 79–97)
MONOCYTES # BLD AUTO: 0.55 10*3/MM3 (ref 0.1–0.9)
MONOCYTES NFR BLD AUTO: 7.6 % (ref 5–12)
NEUTROPHILS NFR BLD AUTO: 4.84 10*3/MM3 (ref 1.7–7)
NEUTROPHILS NFR BLD AUTO: 67.2 % (ref 42.7–76)
NRBC BLD AUTO-RTO: 0 /100 WBC (ref 0–0.2)
PLATELET # BLD AUTO: 142 10*3/MM3 (ref 140–450)
PMV BLD AUTO: 10.4 FL (ref 6–12)
POTASSIUM SERPL-SCNC: 3.6 MMOL/L (ref 3.5–5.2)
POTASSIUM SERPL-SCNC: 3.8 MMOL/L (ref 3.5–5.2)
QT INTERVAL: 362 MS
QT INTERVAL: 366 MS
QTC INTERVAL: 507 MS
QTC INTERVAL: 508 MS
RBC # BLD AUTO: 5.18 10*6/MM3 (ref 4.14–5.8)
SODIUM SERPL-SCNC: 136 MMOL/L (ref 136–145)
UFH PPP CHRO-ACNC: 0.65 IU/ML (ref 0.3–0.7)
UFH PPP CHRO-ACNC: 0.96 IU/ML (ref 0.3–0.7)
WBC NRBC COR # BLD AUTO: 7.2 10*3/MM3 (ref 3.4–10.8)

## 2024-07-26 PROCEDURE — 94664 DEMO&/EVAL PT USE INHALER: CPT

## 2024-07-26 PROCEDURE — 94761 N-INVAS EAR/PLS OXIMETRY MLT: CPT

## 2024-07-26 PROCEDURE — 99233 SBSQ HOSP IP/OBS HIGH 50: CPT | Performed by: INTERNAL MEDICINE

## 2024-07-26 PROCEDURE — 99232 SBSQ HOSP IP/OBS MODERATE 35: CPT | Performed by: INTERNAL MEDICINE

## 2024-07-26 PROCEDURE — 94799 UNLISTED PULMONARY SVC/PX: CPT

## 2024-07-26 PROCEDURE — 93321 DOPPLER ECHO F-UP/LMTD STD: CPT | Performed by: INTERNAL MEDICINE

## 2024-07-26 PROCEDURE — 93308 TTE F-UP OR LMTD: CPT

## 2024-07-26 PROCEDURE — 93005 ELECTROCARDIOGRAM TRACING: CPT | Performed by: INTERNAL MEDICINE

## 2024-07-26 PROCEDURE — 93010 ELECTROCARDIOGRAM REPORT: CPT | Performed by: INTERNAL MEDICINE

## 2024-07-26 PROCEDURE — 25010000002 FUROSEMIDE PER 20 MG: Performed by: INTERNAL MEDICINE

## 2024-07-26 PROCEDURE — 85520 HEPARIN ASSAY: CPT | Performed by: INTERNAL MEDICINE

## 2024-07-26 PROCEDURE — 83735 ASSAY OF MAGNESIUM: CPT | Performed by: INTERNAL MEDICINE

## 2024-07-26 PROCEDURE — 85025 COMPLETE CBC W/AUTO DIFF WBC: CPT | Performed by: INTERNAL MEDICINE

## 2024-07-26 PROCEDURE — 84132 ASSAY OF SERUM POTASSIUM: CPT

## 2024-07-26 PROCEDURE — 94726 PLETHYSMOGRAPHY LUNG VOLUMES: CPT

## 2024-07-26 PROCEDURE — 82948 REAGENT STRIP/BLOOD GLUCOSE: CPT

## 2024-07-26 PROCEDURE — 80048 BASIC METABOLIC PNL TOTAL CA: CPT | Performed by: INTERNAL MEDICINE

## 2024-07-26 PROCEDURE — 93321 DOPPLER ECHO F-UP/LMTD STD: CPT

## 2024-07-26 PROCEDURE — 25010000002 HEPARIN (PORCINE) 25000-0.45 UT/250ML-% SOLUTION: Performed by: INTERNAL MEDICINE

## 2024-07-26 PROCEDURE — 93308 TTE F-UP OR LMTD: CPT | Performed by: INTERNAL MEDICINE

## 2024-07-26 RX ORDER — FUROSEMIDE 40 MG/1
40 TABLET ORAL DAILY
Status: DISCONTINUED | OUTPATIENT
Start: 2024-07-26 | End: 2024-08-01 | Stop reason: HOSPADM

## 2024-07-26 RX ORDER — BUDESONIDE AND FORMOTEROL FUMARATE DIHYDRATE 160; 4.5 UG/1; UG/1
2 AEROSOL RESPIRATORY (INHALATION)
Status: DISCONTINUED | OUTPATIENT
Start: 2024-07-26 | End: 2024-08-01 | Stop reason: HOSPADM

## 2024-07-26 RX ORDER — HEPARIN SODIUM 10000 [USP'U]/100ML
5.81 INJECTION, SOLUTION INTRAVENOUS
Status: DISCONTINUED | OUTPATIENT
Start: 2024-07-26 | End: 2024-07-26

## 2024-07-26 RX ORDER — FUROSEMIDE 10 MG/ML
40 INJECTION INTRAMUSCULAR; INTRAVENOUS ONCE
Status: COMPLETED | OUTPATIENT
Start: 2024-07-26 | End: 2024-07-26

## 2024-07-26 RX ORDER — ASPIRIN 81 MG/1
81 TABLET ORAL DAILY
Status: DISCONTINUED | OUTPATIENT
Start: 2024-07-26 | End: 2024-08-01 | Stop reason: HOSPADM

## 2024-07-26 RX ORDER — ALBUTEROL SULFATE 2.5 MG/3ML
2.5 SOLUTION RESPIRATORY (INHALATION) EVERY 6 HOURS PRN
Status: DISCONTINUED | OUTPATIENT
Start: 2024-07-26 | End: 2024-08-01 | Stop reason: HOSPADM

## 2024-07-26 RX ORDER — POTASSIUM CHLORIDE 7.45 MG/ML
10 INJECTION INTRAVENOUS
Status: DISCONTINUED | OUTPATIENT
Start: 2024-07-26 | End: 2024-07-26

## 2024-07-26 RX ORDER — POTASSIUM CHLORIDE 20 MEQ/1
40 TABLET, EXTENDED RELEASE ORAL EVERY 4 HOURS
Status: COMPLETED | OUTPATIENT
Start: 2024-07-26 | End: 2024-07-26

## 2024-07-26 RX ORDER — ATORVASTATIN CALCIUM 40 MG/1
40 TABLET, FILM COATED ORAL NIGHTLY
Status: DISCONTINUED | OUTPATIENT
Start: 2024-07-26 | End: 2024-08-01 | Stop reason: HOSPADM

## 2024-07-26 RX ORDER — CETIRIZINE HYDROCHLORIDE 10 MG/1
10 TABLET ORAL DAILY
Status: DISCONTINUED | OUTPATIENT
Start: 2024-07-26 | End: 2024-08-01 | Stop reason: HOSPADM

## 2024-07-26 RX ADMIN — ASPIRIN 81 MG: 81 TABLET, COATED ORAL at 11:45

## 2024-07-26 RX ADMIN — METOPROLOL TARTRATE 25 MG: 25 TABLET, FILM COATED ORAL at 20:01

## 2024-07-26 RX ADMIN — METOPROLOL TARTRATE 25 MG: 25 TABLET, FILM COATED ORAL at 11:45

## 2024-07-26 RX ADMIN — Medication 10 ML: at 20:01

## 2024-07-26 RX ADMIN — EMPAGLIFLOZIN 10 MG: 10 TABLET, FILM COATED ORAL at 11:45

## 2024-07-26 RX ADMIN — RIVAROXABAN 20 MG: 20 TABLET, FILM COATED ORAL at 17:31

## 2024-07-26 RX ADMIN — ATORVASTATIN CALCIUM 40 MG: 40 TABLET, FILM COATED ORAL at 20:01

## 2024-07-26 RX ADMIN — Medication 10 ML: at 09:41

## 2024-07-26 RX ADMIN — POTASSIUM CHLORIDE 40 MEQ: 1500 TABLET, EXTENDED RELEASE ORAL at 14:10

## 2024-07-26 RX ADMIN — HEPARIN SODIUM 5.81 UNITS/KG/HR: 10000 INJECTION, SOLUTION INTRAVENOUS at 06:52

## 2024-07-26 RX ADMIN — OFLOXACIN 50000 UNITS: 300 TABLET, COATED ORAL at 11:45

## 2024-07-26 RX ADMIN — POTASSIUM CHLORIDE 40 MEQ: 1500 TABLET, EXTENDED RELEASE ORAL at 09:41

## 2024-07-26 RX ADMIN — CETIRIZINE HYDROCHLORIDE 10 MG: 10 TABLET, FILM COATED ORAL at 11:45

## 2024-07-26 RX ADMIN — FUROSEMIDE 40 MG: 10 INJECTION, SOLUTION INTRAMUSCULAR; INTRAVENOUS at 11:45

## 2024-07-26 NOTE — CONSULTS
Heart Failure Education Consult    49 y.o. male PMHx: DMII, HTN, HLD, HFpEF, NELY, PE ([place])   Active Hospital Problems    Diagnosis     **Acute respiratory failure with hypoxia        Social History     Socioeconomic History    Marital status:    Tobacco Use    Smoking status: Former    Smokeless tobacco: Current     Types: Chew    Tobacco comments:     2019 QUIT   Vaping Use    Vaping status: Never Used    Passive vaping exposure: Yes   Substance and Sexual Activity    Alcohol use: Never    Drug use: Never    Sexual activity: Defer       Type of Heart Failure: Diastolic     Length of diagnosis: Previous Diagnosis     Current HF knowledge: good     Have you had HF education/teaching in the past? No - not at this facility  Edema Yes        Shortness of Breath: Improving     Number of pillows used to sleep at night: 2  Current HF zone  green  Barriers to learning: Other - none identified      Materials Provided:HF Action Plan, Daily Weight Monitoring , and 2 Gm Na+ diet       Do you check your weight daily? No    Current weight        07/25/24  1508 07/26/24  0500   Weight: (!) 172 kg (380 lb) (!) 172 kg (380 lb 1.6 oz)         Most recent ReDS Ordered - not yet obtained Date 7/26/24    Most recent ProBNP   Lab Results   Component Value Date    PROBNP 2,468.0 (H) 07/25/2024           Most recent   Results for orders placed during the hospital encounter of 07/13/24    Adult Transthoracic Echo Complete w/ Color, Spectral and Contrast if necessary per protocol    Interpretation Summary    Left ventricular systolic function is normal. Calculated left ventricular EF = 69% Left ventricular ejection fraction appears to be 66 - 70%.    Left ventricular wall thickness is consistent with mild concentric hypertrophy.    The right ventricular cavity is moderate to severely dilated.    The left atrial cavity is mildly dilated.    Left atrial volume is mildly increased.    The right atrial cavity is severely  dilated.     Estimated right ventricular systolic pressure from tricuspid regurgitation is markedly elevated (>55 mmHg). Calculated right ventricular systolic pressure from tricuspid regurgitation is 55 mmHg.    The aortic root measures 3.7 cm.            Medications Prior to Admission   Medication Sig Dispense Refill Last Dose    albuterol sulfate  (90 Base) MCG/ACT inhaler Inhale 2 puffs Every 6 (Six) Hours As Needed for Wheezing or Shortness of Air (COPD).   7/25/2024    aspirin 81 MG EC tablet Take 1 tablet by mouth Daily. 30 tablet 0 7/25/2024    atorvastatin (LIPITOR) 40 MG tablet Take 1 tablet by mouth Every Night. 30 tablet 0 7/24/2024    budesonide-formoterol (SYMBICORT) 160-4.5 MCG/ACT inhaler Inhale 2 puffs 2 (Two) Times a Day As Needed (COPD).   7/25/2024    cetirizine (zyrTEC) 10 MG tablet Take 1 tablet by mouth Daily.   7/25/2024    empagliflozin (JARDIANCE) 10 MG tablet tablet Take 1 tablet by mouth Daily. 30 tablet 1 7/25/2024    furosemide (LASIX) 40 MG tablet Take 1 tablet by mouth Daily.   7/25/2024    lisinopril (PRINIVIL,ZESTRIL) 40 MG tablet TAKE 1 TABLET BY MOUTH DAILY 90 tablet 0 7/25/2024    metFORMIN (GLUCOPHAGE) 1000 MG tablet Take 1 tablet by mouth 2 (Two) Times a Day With Meals.   7/25/2024    metoprolol tartrate (LOPRESSOR) 25 MG tablet Take 1 tablet by mouth Every 12 (Twelve) Hours. 60 tablet 0 7/25/2024    rivaroxaban (XARELTO) 20 MG tablet Take 1 tablet by mouth Daily With Dinner.   7/25/2024    cholecalciferol (VITAMIN D3) 1.25 MG (03456 UT) capsule Take 1 capsule by mouth Every 7 (Seven) Days.   not picked up yet       Unable to provide heart failure education today:  []  Patient/family refused   []  Not available  []  Not able to participate   []  Other:              Referral candidate for HF Clinic:Yes     Thank you for this consult. Please let me know if I can be of any assistance with HF education for this patient.    30 minutes was spent on this visit    Electronically signed by,    Raven Redman RN ,DNP, MSN, CHFN  07/26/24 14:05 EDT

## 2024-07-26 NOTE — OUTREACH NOTE
[facility] Week 1 Survey      Flowsheet Row Responses   [facility] facility patient discharged from? [facility]   Does the patient have one of the following disease processes/diagnoses(primary or secondary)? Acute MI (STEMI,NSTEMI)   Week 1 attempt successful? No   Unsuccessful attempts Attempt 1   Revoke Readmitted            OSCAR MARTINEZ - Registered Nurse

## 2024-07-26 NOTE — PROGRESS NOTES
[facility] HOSPITALIST PROGRESS NOTE     Patient Identification:  Name:  Con Colón  Age:  49 y.o.  Sex:  male  :  1975  MRN:  [id number]  Visit Number:  [id number]  ROOM: [address] [place] [address]     Primary Care Provider:  Halie Dubois APRN    Length of stay in inpatient status:  1    Subjective     Chief Compliant:    Chief Complaint   Patient presents with    Syncope    Shortness of Breath       History of Presenting Illness:    Patient denies any new complaints. Hypoxic overnight he took off O2 and saturating well today. Patient tachycardic around 120 on my evaluation eating lunch.     ROS:  Otherwise 10 point ROS negative other than documented above in HPI.     Objective     Current Hospital Meds:aspirin, 81 mg, Oral, Daily  atorvastatin, 40 mg, Oral, Nightly  budesonide-formoterol, 2 puff, Inhalation, BID - RT  cetirizine, 10 mg, Oral, Daily  cholecalciferol, 50,000 Units, Oral, Q7 Days  empagliflozin, 10 mg, Oral, Daily  furosemide, 40 mg, Oral, Daily  insulin lispro, 2-9 Units, Subcutaneous, 4x Daily AC & at Bedtime  metoprolol tartrate, 25 mg, Oral, Q12H  rivaroxaban, 20 mg, Oral, Daily With Dinner  sodium chloride, 10 mL, Intravenous, Q12H         Current Antimicrobial Therapy:  Anti-Infectives (From admission, onward)      None          Current Diuretic Therapy:  Diuretics (From admission, onward)      Ordered     Dose/Rate Route Frequency Start Stop    24 1120  furosemide (LASIX) injection 40 mg        Ordering Provider: Davey Rubio MD    40 mg Intravenous Once 24 1215 24 1145    24 1035  furosemide (LASIX) tablet 40 mg        Ordering Provider: Davey Rubio MD    40 mg Oral Daily 24 1100      24 1424  furosemide (LASIX) injection 40 mg        Ordering Provider: Davey Rubio MD    40 mg Intravenous Once 24 1440 24 1428           ----------------------------------------------------------------------------------------------------------------------  Vital Signs:  Temp:  [97.6 °F (36.4 °C)-97.9 °F (36.6 °C)] 97.9 °F (36.6 °C)  Heart Rate:  [] 116  Resp:  [18-20] 20  BP: (111-122)/(65-84) 111/68  SpO2:  [92 %-96 %] 92 %  on  Flow (L/min):  [2.5] 2.5;   Device (Oxygen Therapy): room air  Body mass index is 53.04 kg/m².    Wt Readings from Last 3 Encounters:   07/26/24 (!) 172 kg (380 lb 1.6 oz)   07/18/24 (!) 172 kg (379 lb 3.1 oz)   07/16/24 (!) 172 kg (379 lb 13.6 oz)     Intake & Output (last 3 days)         07/24 0701 07/25 0700 07/25 0701 07/26 0700 07/26 0701 07/27 0700    P.O.  440 1200    Total Intake(mL/kg)  440 (2.6) 1200 (7)    Net  +440 +1200           Urine Unmeasured Occurrence  7 x 5 x          Diet: Cardiac, Diabetic; Healthy Heart (2-3 Na+); Consistent Carbohydrate; Fluid Consistency: Thin (IDDSI 0)  ----------------------------------------------------------------------------------------------------------------------  Physical exam:  Constitutional:  Well-developed and well-nourished.  No respiratory distress. Obese.   HENT:  Head:  Normocephalic and atraumatic.  Mouth:  Moist mucous membranes.    Eyes:  Conjunctivae and EOM are normal. No scleral icterus.    Neck:  Neck supple.  No JVD present.    Cardiovascular:  Normal rate, regular rhythm and normal heart sounds with no murmur.  Pulmonary/Chest:  No respiratory distress, no wheezes, no crackles, with normal breath sounds and good air movement.  Abdominal:  Soft.  Bowel sounds are normal.  No distension and no tenderness.   Musculoskeletal:  No edema, no tenderness, and no deformity.  No red or swollen joints anywhere.    Neurological:  Alert and oriented to person, place, and time.  No cranial nerve deficit.  No tongue deviation.  No facial droop.  No slurred speech.   Skin:  Skin is warm and dry. No rash noted. No pallor.   Peripheral vascular:  Pulses in all 4  "extremities with no clubbing, no cyanosis, no edema.  ----------------------------------------------------------------------------------------------------------------------  Tele:    ----------------------------------------------------------------------------------------------------------------------  Results from last 7 days   Lab Units 07/26/24  0538 07/25/24  1038   CRP mg/dL  --  0.33   WBC 10*3/mm3 7.20 7.55   HEMOGLOBIN g/dL 15.4 16.0   HEMATOCRIT % 48.8 50.6   MCV fL 94.2 93.7   MCHC g/dL 31.6 31.6   PLATELETS 10*3/mm3 142 151   INR   --  2.31*         Results from last 7 days   Lab Units 07/26/24  1607 07/26/24  0538 07/25/24  1038   SODIUM mmol/L  --  136 144   POTASSIUM mmol/L 3.8 3.6 4.2   MAGNESIUM mg/dL  --  2.0  --    CHLORIDE mmol/L  --  104 108*   CO2 mmol/L  --  20.6* 23.4   BUN mg/dL  --  21* 22*   CREATININE mg/dL  --  1.09 1.19   CALCIUM mg/dL  --  9.1 9.2   GLUCOSE mg/dL  --  125* 98   ALBUMIN g/dL  --   --  3.9   BILIRUBIN mg/dL  --   --  1.0   ALK PHOS U/L  --   --  113   AST (SGOT) U/L  --   --  18   ALT (SGPT) U/L  --   --  14   Estimated Creatinine Clearance: 135.1 mL/min (by C-G formula based on SCr of 1.09 mg/dL).  No results found for: \"AMMONIA\"  Results from last 7 days   Lab Units 07/25/24  1730 07/25/24  1247 07/25/24  1038   HSTROP T ng/L 28* 33* 33*     Results from last 7 days   Lab Units 07/25/24  1038   PROBNP pg/mL 2,468.0*         Glucose   Date/Time Value Ref Range Status   07/26/2024 1902 140 (H) 70 - 130 mg/dL Final   07/26/2024 1644 106 70 - 130 mg/dL Final   07/26/2024 1107 86 70 - 130 mg/dL Final   07/26/2024 0708 102 70 - 130 mg/dL Final   07/25/2024 1918 154 (H) 70 - 130 mg/dL Final   07/25/2024 1637 150 (H) 70 - 130 mg/dL Final     Lab Results   Component Value Date    TSH 1.630 01/26/2022     No results found for: \"PREGTESTUR\", \"PREGSERUM\", \"HCG\", \"HCGQUANT\"  Pain Management Panel          Latest Ref Rng & Units 7/25/2024   Pain Management Panel   Amphetamine, Urine " "Qual Negative Negative    Barbiturates Screen, Urine Negative Negative    Benzodiazepine Screen, Urine Negative Negative    Buprenorphine, Screen, Urine Negative Negative    Cocaine Screen, Urine Negative Negative    Fentanyl, Urine Negative Negative    Methadone Screen , Urine Negative Negative    Methamphetamine, Ur Negative Negative       Details                 Brief Urine Lab Results       None          No results found for: \"BLOODCX\"  No results found for: \"URINECX\"  No results found for: \"WOUNDCX\"  No results found for: \"STOOLCX\"  No results found for: \"RESPCX\"  No results found for: \"AFBCX\"  Results from last 7 days   Lab Units 07/25/24  1038   CRP mg/dL 0.33       I have personally looked at the labs and they are summarized above.  ----------------------------------------------------------------------------------------------------------------------  Detailed radiology reports for the last 24 hours:    Imaging Results (Last 24 Hours)       ** No results found for the last 24 hours. **          Assessment & Plan      #New on onset atrial flutter w/ RVR  - Already on xarelto.   - Echo pending   - Cardiology planning on sotolol with QTC monitoring.   - Monitor on tele.     #Hypoxia on presentation   #Exertional dyspnea  #HFpEF  #NELY  #Suspected obesity hypoventilation   - No longer hypoxic and refusing supplemental O2 or ABG  - ProBNP elevated and mild overload on plain film of chest, will continue to diurese with IV lasix.   - Monitor renal function and electrolytes   - Continue nightly CPAP  - Monitor on tele with continuous pulse oximetry      #Recent NSTEMI, normal stress test, abnormal CTA  #Chronic troponin elevation   - Troponin 33=>33. Denies chest pain. No significant change on EKG.   - Cardiology consulted for recommendations on further ischemic w/u.      #DM II  - SSI and titrate as needed      #HTN  #HLD  - Restart home regimen      #Hx of PE  - Continue home xarelto      Code status: Full    "   Dispo: Admit to tele     Davey Rubio MD  [facility]  07/26/24  19:28 EDT

## 2024-07-26 NOTE — PLAN OF CARE
Goal Outcome Evaluation:      Patient sitting in bed at this time voicing no complaints or concerns. Pt pleasant and cooperative with all care this shift. VSS with O2 sat remaining >90% on room air, potassium replaced per protocol, IV access maintained, no s/s of acute distress noted at this time. Plan of care ongoing.

## 2024-07-26 NOTE — PROGRESS NOTES
[facility]  PROGRESS NOTE    Patient information:  Name: Con Colón  Age/Sex: 49 y.o. male  :  1975        PCP: Halie Dubois APRN  Attending: Dvaey Rubio MD  MRN:  [id number]  Visit Number:  [id number]    LOS: 1  CODE STATUS:    Code Status and Medical Interventions: CPR (Attempt to Resuscitate); Full Support   Ordered at: 24 1425     Level Of Support Discussed With:    Patient     Code Status (Patient has no pulse and is not breathing):    CPR (Attempt to Resuscitate)     Medical Interventions (Patient has pulse or is breathing):    Full Support       PROBLEM LIST:Principal Problem:    Acute respiratory failure with hypoxia      Reason for Cardiology follow-up: Consideration for [facility]     Subjective   ADMISSION INFORMATION:  Chief Complaint   Patient presents with    Syncope    Shortness of Breath       DATE:2024    HPI:  Con Colón is a 49 y.o. male with a past medical history significant for nonobstructive coronary artery disease with recent CT coronary angiogram showing multivessel nonobstructive CAD with worst area being 50 to 70% stenosis in the left circumflex artery with total calcium score of 1200, HFpEF w EF of 66-70% (2024), NELY, Cardiomegaly, DMT2, HLD, HTN, COPD, history of PE anticoagulated with Xarelto and morbid obesity with a BMI of 53.     Patient presented to [facility] ([facility] emergency room (ER) on 2024 with complaints of Dyspnea and unable to walk 10 yards.     Primary Cardiologist has been JORI Moran and he was last seen in the office on 2024.     Interval History:   Over night telemetry reveals SB/SR 40-60s with a few telemetry strips noted in the alarmed area with concern for new onset atrial fibrillation / flutter 100s. Please see strips attached below. According to patient's I & O flow chart he had x 6 unmeasured urine occurrences overnight. AM labs reveal potassium 3.6,  "creatinine of 1.09, hemoglobin 15.4, and platelet count is 142.  Potassium has been supplemented per patient's MAR.     Patient was in room 302 A when he was seen and examined.  Patient is sitting up on the edge of his bed resting quietly.  No acute distress noted at this time.  He reports his breathing has improved and that he did urinate very well during the night.  \"I filled at least one urinal full\". While at bedside, appears patient is in Atrial Flutter 110s. EKG being done at this time. Patient denies previous history of arrhythmias such as A FIB/ Flutter.     ORDERS:   NPO order modified for sips with meds.   Recheck Potassium level has already been ordered to be done at 1644 7/26/2024  Reds Vest reading has already been ordered.    EVENT TIMELINE:   7/25: Xarelto changed to IV Heparin per Dr. Rubio on admission. Last dose of Xarelto was this AM per patient.   7/26: Concern for new onset atrial fibrillation/flutter.     Objective     Vital Signs  Temp:  [97.6 °F (36.4 °C)-97.8 °F (36.6 °C)] 97.7 °F (36.5 °C)  Heart Rate:  [] 115  Resp:  [16-20] 20  BP: ()/(58-88) 122/84  Flow (L/min):  [2-2.5] 2.5  Device (Oxygen Therapy): room air  Vital Signs (last 72 hrs)         07/23 0700 07/24 0659 07/24 0700  07/25 0659 07/25 0700  07/26 0659 07/26 0700 07/26 0745   Most Recent      Temp (°F)     97.6 -  98.8       97.8 (36.6) 07/26 0300    Heart Rate     60 -  74       69 07/26 0300    Resp     16 -  20       20 07/26 0300    BP     92/68 -  123/82       111/65 07/26 0300    SpO2 (%)     87 -  94       94 07/25 2310    Flow (L/min)     2 -  2.5       2.5 07/26 0147          BMI:Body mass index is 53.04 kg/m².    WEIGHT:      07/25/24  1015 07/25/24  1508 07/26/24  0500   Weight: (!) 172 kg (380 lb) (!) 172 kg (380 lb) (!) 172 kg (380 lb 1.6 oz)       DIET:Diet: Cardiac, Diabetic; Healthy Heart (2-3 Na+); Consistent Carbohydrate; Fluid Consistency: Thin (IDDSI 0)    I&O:  Intake & Output (last 3 days)  "        07/23 0701 07/24 0700 07/24 0701  07/25 0700 07/25 0701 07/26 0700 07/26 0701  07/27 0700    P.O.   440     Total Intake(mL/kg)   440 (2.6)     Net   +440             Urine Unmeasured Occurrence   6 x           I have seen and examined Mr. Colón today.  PHYSICAL EXAM:  Constitutional:       Appearance: Healthy appearance. Not in distress.      Comments: BMI 53.04.   Eyes:      Conjunctiva/sclera: Conjunctivae normal.      Pupils: Pupils are equal, round, and reactive to light.   HENT:      Nose: Nose normal.    Mouth/Throat:      Dentition: No dental caries.      Pharynx: Oropharynx is clear.   Neck:      Vascular: No JVR. JVD normal.   Pulmonary:      Effort: Pulmonary effort is normal.      Breath sounds: Normal breath sounds. No wheezing. No rhonchi. No rales.      Comments: Few bibasilar rales noted   Chest:      Chest wall: Not tender to palpatation.   Cardiovascular:      PMI at left midclavicular line. Tachycardia present. Irregular rhythm. Normal S1. Normal S2.       Murmurs: There is no murmur.      No gallop.  No click. No rub.   Pulses:     Intact distal pulses.   Edema:     Peripheral edema present.     Pretibial: bilateral edema of the pretibial area.     Ankle: bilateral 2+ edema of the ankle.     Feet: bilateral 2+ edema of the feet.  Abdominal:      General: Bowel sounds are normal.      Palpations: Abdomen is soft.      Tenderness: There is no abdominal tenderness.   Musculoskeletal: Normal range of motion.         General: No tenderness.      Cervical back: Normal range of motion and neck supple. Skin:     General: Skin is warm and dry.   Neurological:      General: No focal deficit present.      Mental Status: Alert and oriented to person, place and time.                  Results review   Results Review:    I have reviewed the patient's new clinical results. 07/26/24 11:44 EDT    Results from last 7 days   Lab Units 07/25/24  1730 07/25/24  1247 07/25/24  1038   HSTROP T ng/L 28* 33*  33*     Lab Results   Component Value Date    PROBNP 2,468.0 (H) 07/25/2024     Results from last 7 days   Lab Units 07/26/24  0538 07/25/24  1038   WBC 10*3/mm3 7.20 7.55   HEMOGLOBIN g/dL 15.4 16.0   PLATELETS 10*3/mm3 142 151     Results from last 7 days   Lab Units 07/26/24  0538 07/25/24  1038   SODIUM mmol/L 136 144   POTASSIUM mmol/L 3.6 4.2   CHLORIDE mmol/L 104 108*   CO2 mmol/L 20.6* 23.4   BUN mg/dL 21* 22*   CREATININE mg/dL 1.09 1.19   CALCIUM mg/dL 9.1 9.2   GLUCOSE mg/dL 125* 98   ALT (SGPT) U/L  --  14   AST (SGOT) U/L  --  18     Lab Results   Component Value Date    MG 2.0 07/26/2024    MG 1.92 12/13/2019    MG 1.95 12/12/2019     Estimated Creatinine Clearance: 135.1 mL/min (by C-G formula based on SCr of 1.09 mg/dL).    Lab Results   Component Value Date    HGBA1C 6.50 (H) 07/15/2024    HGBA1C 7.19 (H) 01/26/2022    HGBA1C 6.40 (H) 07/26/2021     Lab Results   Component Value Date    CHOL 120 01/26/2022    TRIG 49 01/26/2022    LDL 77 01/26/2022    HDL 32 (L) 01/26/2022        Lab Results   Component Value Date    INR 2.31 (H) 07/25/2024    INR 1.33 (H) 07/13/2024    INR 1.02 (L) 11/16/2020    INR 1.37 (L) 01/16/2020    INR 1.21 (L) 12/13/2019    INR 1.21 (L) 12/12/2019    INR 1.21 (L) 12/10/2019     Lab Results   Component Value Date    LABHEPA 0.96 (H) 07/26/2024    LABHEPA >1.10 (C) 07/25/2024    LABHEPA >1.10 (C) 07/25/2024    LABHEPA <0.10 (L) 07/16/2024       Lab Results   Component Value Date    TSH 1.630 01/26/2022      Pain Management Panel          Latest Ref Rng & Units 7/25/2024   Pain Management Panel   Amphetamine, Urine Qual Negative Negative    Barbiturates Screen, Urine Negative Negative    Benzodiazepine Screen, Urine Negative Negative    Buprenorphine, Screen, Urine Negative Negative    Cocaine Screen, Urine Negative Negative    Fentanyl, Urine Negative Negative    Methadone Screen , Urine Negative Negative    Methamphetamine, Ur Negative Negative       Details                  Microbiology Results (last 10 days)       ** No results found for the last 240 hours. **           Imaging Results (Last 24 Hours)       Procedure Component Value Units Date/Time    CT Angiogram Chest Pulmonary Embolism [[id number]] Collected: 07/25/24 1334     Updated: 07/25/24 1337    Narrative:      EXAM:    CT Angiography Chest With Intravenous Contrast     EXAM DATE:    7/25/2024 1:21 PM     CLINICAL HISTORY:    SOB, chest pain     TECHNIQUE:    Axial computed tomographic angiography images of the chest with  intravenous contrast.  This CT exam was performed using one or more of  the following dose reduction techniques:  automated exposure control,  adjustment of the mA and/or kV according to patient size, and/or use of  iterative reconstruction technique.    MIP reconstructed images were created and reviewed.     COMPARISON:    No relevant prior studies available.     FINDINGS:    Limitations:  Suboptimal opacification of the pulmonary arteries.    Pulmonary arteries:  No pulmonary embolism is identified.  Some of the  distal pulmonary arteries cannot be evaluated due to suboptimal  opacification.    Aorta:  No acute findings.  No thoracic aortic aneurysm.    Lungs and pleural spaces:  Patchy bilateral groundglass attenuation  suggesting of small airways trapping.  No mass.  No consolidation.  No  significant effusion.    Heart:  Unremarkable as visualized.  No cardiomegaly.  No significant  pericardial effusion.  No evidence of RV dysfunction.    Bones/joints:  No acute fracture.  No dislocation.    Soft tissues:  Unremarkable as visualized.    Lymph nodes:  Unremarkable as visualized.  No enlarged lymph nodes.    Liver:  Cirrhotic nodular liver contour. Tiny ascites.       Impression:      1.  No pulmonary embolism is identified.  Some of the distal pulmonary  arteries cannot be evaluated due to suboptimal opacification.  2.  Patchy bilateral groundglass attenuation suggesting of small  airways  trapping.  3.  Cirrhotic nodular liver contour. Tiny ascites.        This report was finalized on 2024 1:34 PM by Dr. Miguel Dillon MD.             ECHO:  Results for orders placed during the hospital encounter of 24    Adult Transthoracic Echo Complete w/ Color, Spectral and Contrast if necessary per protocol    Interpretation Summary    Left ventricular systolic function is normal. Calculated left ventricular EF = 69% Left ventricular ejection fraction appears to be 66 - 70%.    Left ventricular wall thickness is consistent with mild concentric hypertrophy.    The right ventricular cavity is moderate to severely dilated.    The left atrial cavity is mildly dilated.    Left atrial volume is mildly increased.    The right atrial cavity is severely  dilated.    Estimated right ventricular systolic pressure from tricuspid regurgitation is markedly elevated (>55 mmHg). Calculated right ventricular systolic pressure from tricuspid regurgitation is 55 mmHg.    The aortic root measures 3.7 cm.      STRESS TEST:  Results for orders placed during the hospital encounter of 24    Stress Test With Myocardial Perfusion One Day    Interpretation Summary    Myocardial perfusion imaging indicates a normal myocardial perfusion study with no evidence of ischemia.    Left ventricular ejection fraction is normal (Calculated EF = 61%).    TID 1.12.    Findings consistent with a normal ECG stress test.       HEART CATH:  No results found for this or any previous visit.      [id number]      TELEMETRY:  Atrial Flutter 110s        SR 60s with a few telemetry strips noted in the alarmed area with possible atrial fibrillation / flutter. Please see strips attached below.                              I reviewed the patient's new clinical results.    ALLERGIES: Nuvigil [armodafinil] and Provigil [modafinil]    Medication Review:   Current list of medications may not reflect those currently placed in orders that  are not signed or are being held.     aspirin, 81 mg, Oral, Daily  atorvastatin, 40 mg, Oral, Nightly  budesonide-formoterol, 2 puff, Inhalation, BID - RT  cetirizine, 10 mg, Oral, Daily  cholecalciferol, 50,000 Units, Oral, Q7 Days  empagliflozin, 10 mg, Oral, Daily  furosemide, 40 mg, Intravenous, Once  furosemide, 40 mg, Oral, Daily  insulin lispro, 2-9 Units, Subcutaneous, 4x Daily AC & at Bedtime  metoprolol tartrate, 25 mg, Oral, Q12H  potassium chloride, 40 mEq, Oral, Q4H  sodium chloride, 10 mL, Intravenous, Q12H      heparin, 5.81 Units/kg/hr, Last Rate: 5.81 Units/kg/hr (07/26/24 0652)  Pharmacy to Dose Heparin,         albuterol    senna-docusate sodium **AND** polyethylene glycol **AND** bisacodyl **AND** bisacodyl    Calcium Replacement - Follow Nurse / BPA Driven Protocol    dextrose    dextrose    glucagon (human recombinant)    Magnesium Standard Dose Replacement - Follow Nurse / BPA Driven Protocol    nitroglycerin    Pharmacy to Dose Heparin    Phosphorus Replacement - Follow Nurse / BPA Driven Protocol    Potassium Replacement - Follow Nurse / BPA Driven Protocol    [COMPLETED] Insert Peripheral IV **AND** sodium chloride    sodium chloride    sodium chloride    Assessment      Acute on chronic HFpEF, seems to be improving.  New onset atrial flutter with rapid ventricular response.  [id number] score of 3 for hypertension, type 2 diabetes mellitus and heart failure.  Severe pulmonary hypertension noted on recent echo Doppler study, could be due to undiagnosed obstructive sleep apnea.  Morbid obesity with a BMI of 53.  Type 2 diabetes mellitus.      Recommendations / Plan     For his heart failure, continue with IV diuretics for 1 more day and continue to monitor his urine output and kidney function and adjust doses accordingly.  For his atrial flutter, will initiate sotalol once his potassium levels are corrected to at least 4 and monitor the QTc over the next 48 hours.  Patient is already on  Xarelto prior to admission for history of DVT and pulmonary embolus.  Will continue this for stroke prevention as well.  Since his troponin levels have been flat and with patient not having any anginal symptoms, and with recent CT coronary angiogram revealing nonobstructive disease, I do not see any absolute indication for invasive coronary angiography at this time.  Try to maintain his potassium levels between 4 and 5 and magnesium levels between 2 and 2.2.  Avoid other QTc prolonging drugs.  Consider sleep study as an outpatient to rule out obstructive sleep apnea.  Will add IV diltiazem if needed for adequate heart rate control.  If patient has persistent atrial flutter, may consider electrocardioversion later on.      I have discussed the patients findings and recommendations with the patient and Dr. Rubio.    Thank you very much for asking us to be involved in this patient's care.  We will follow along with you.    Electronically signed by JACEK Seo, 07/26/24, 11:44 AM EDT.     Electronically signed by Micheal Chang MD, 07/26/24, 12:51 PM EDT.          Please note that portions of this note were completed with a voice recognition program.    Please note that portions of this note were copied and has been reviewed and is accurate as of 7/26/2024 .

## 2024-07-26 NOTE — CASE MANAGEMENT/SOCIAL WORK
Discharge Planning Assessment   [place]     Patient Name: Con Colón  MRN: [id number]  Today's Date: 7/26/2024    Admit Date: 7/25/2024    Plan: SS received a consult for discharge planning. SS followed up on this date. Pt lives alone. PCP is Halie Dubois. Pt does not utilize home health services at this time. Pt have Cpap via [employer]. Pt does not have a POA or Living will at this time. Pt plans to return home at discharge with friend to provide transportation. SS to sign off at this time. SS can be reconsulted at later date if needed.   Discharge Needs Assessment       Row Name 07/26/24 1112       Living Environment    People in Home alone    Current Living Arrangements home    Potentially Unsafe Housing Conditions none    Primary Care Provided by self    Provides Primary Care For no one    Family Caregiver if Needed friend(s)    Quality of Family Relationships unable to assess    Able to Return to Prior Arrangements yes       Resource/Environmental Concerns    Resource/Environmental Concerns none    Transportation Concerns none       Transition Planning    Patient/Family Anticipates Transition to home    Patient/Family Anticipated Services at Transition none    Transportation Anticipated family or friend will provide       Discharge Needs Assessment    Equipment Currently Used at Home cpap    Anticipated Changes Related to Illness none    Equipment Needed After Discharge none           Discharge Plan       Row Name 07/26/24 1112       Plan    Plan SS received a consult for discharge planning. SS followed up on this date. Pt lives alone. PCP is Halie Dubois. Pt does not utilize home health services at this time. Pt have Cpap via [employer]. Pt does not have a POA or Living will at this time. Pt plans to return home at discharge with friend to provide transportation. SS to sign off at this time. SS can be reconsulted at later date if needed.    Patient/Family in Agreement with Plan yes        Expected Discharge  Date and Time       Expected Discharge Date Expected Discharge Time    Jul 28, 2024            Demographic Summary       Row Name 07/26/24 1111       General Information    Admission Type inpatient    Referral Source nursing    Reason for Consult discharge planning  SS received a consult for discharge planning.                HARDEEP Watkins

## 2024-07-26 NOTE — PLAN OF CARE
Goal Outcome Evaluation:  Pt rested in bed this shift, non complaint with aspects of care, Miguel APRN aware. Pt O2 dropped to 76% on home CPAP, pt initially refused to wear NC stating he doesn't need it, education provided, pt now resting in bed on 4L NC, saturations maintaining above 90%. No acute s/s of distress at this time. VSS. Will continue plan of care.

## 2024-07-27 LAB
ANION GAP SERPL CALCULATED.3IONS-SCNC: 9.8 MMOL/L (ref 5–15)
ANION GAP SERPL CALCULATED.3IONS-SCNC: 9.9 MMOL/L (ref 5–15)
BUN SERPL-MCNC: 18 MG/DL (ref 6–20)
BUN SERPL-MCNC: 18 MG/DL (ref 6–20)
BUN/CREAT SERPL: 16.4 (ref 7–25)
BUN/CREAT SERPL: 18.4 (ref 7–25)
CALCIUM SPEC-SCNC: 9.2 MG/DL (ref 8.6–10.5)
CALCIUM SPEC-SCNC: 9.4 MG/DL (ref 8.6–10.5)
CHLORIDE SERPL-SCNC: 103 MMOL/L (ref 98–107)
CHLORIDE SERPL-SCNC: 104 MMOL/L (ref 98–107)
CO2 SERPL-SCNC: 25.2 MMOL/L (ref 22–29)
CO2 SERPL-SCNC: 26.1 MMOL/L (ref 22–29)
CREAT SERPL-MCNC: 0.98 MG/DL (ref 0.76–1.27)
CREAT SERPL-MCNC: 1.1 MG/DL (ref 0.76–1.27)
EGFRCR SERPLBLD CKD-EPI 2021: 83.3 ML/MIN/1.73
EGFRCR SERPLBLD CKD-EPI 2021: 95.7 ML/MIN/1.73
GLUCOSE BLDC GLUCOMTR-MCNC: 103 MG/DL (ref 70–130)
GLUCOSE BLDC GLUCOMTR-MCNC: 110 MG/DL (ref 70–130)
GLUCOSE BLDC GLUCOMTR-MCNC: 123 MG/DL (ref 70–130)
GLUCOSE BLDC GLUCOMTR-MCNC: 137 MG/DL (ref 70–130)
GLUCOSE SERPL-MCNC: 113 MG/DL (ref 65–99)
GLUCOSE SERPL-MCNC: 99 MG/DL (ref 65–99)
MAGNESIUM SERPL-MCNC: 2 MG/DL (ref 1.6–2.6)
MAGNESIUM SERPL-MCNC: 2.1 MG/DL (ref 1.6–2.6)
POTASSIUM SERPL-SCNC: 4.1 MMOL/L (ref 3.5–5.2)
POTASSIUM SERPL-SCNC: 4.5 MMOL/L (ref 3.5–5.2)
SODIUM SERPL-SCNC: 139 MMOL/L (ref 136–145)
SODIUM SERPL-SCNC: 139 MMOL/L (ref 136–145)

## 2024-07-27 PROCEDURE — 99232 SBSQ HOSP IP/OBS MODERATE 35: CPT | Performed by: INTERNAL MEDICINE

## 2024-07-27 PROCEDURE — 94761 N-INVAS EAR/PLS OXIMETRY MLT: CPT

## 2024-07-27 PROCEDURE — 80048 BASIC METABOLIC PNL TOTAL CA: CPT | Performed by: INTERNAL MEDICINE

## 2024-07-27 PROCEDURE — 93010 ELECTROCARDIOGRAM REPORT: CPT | Performed by: INTERNAL MEDICINE

## 2024-07-27 PROCEDURE — 99232 SBSQ HOSP IP/OBS MODERATE 35: CPT | Performed by: NURSE PRACTITIONER

## 2024-07-27 PROCEDURE — 82948 REAGENT STRIP/BLOOD GLUCOSE: CPT

## 2024-07-27 PROCEDURE — 83735 ASSAY OF MAGNESIUM: CPT | Performed by: INTERNAL MEDICINE

## 2024-07-27 PROCEDURE — 94664 DEMO&/EVAL PT USE INHALER: CPT

## 2024-07-27 PROCEDURE — 83735 ASSAY OF MAGNESIUM: CPT | Performed by: NURSE PRACTITIONER

## 2024-07-27 PROCEDURE — 94799 UNLISTED PULMONARY SVC/PX: CPT

## 2024-07-27 PROCEDURE — 80048 BASIC METABOLIC PNL TOTAL CA: CPT | Performed by: NURSE PRACTITIONER

## 2024-07-27 PROCEDURE — 93005 ELECTROCARDIOGRAM TRACING: CPT | Performed by: NURSE PRACTITIONER

## 2024-07-27 RX ORDER — SOTALOL HYDROCHLORIDE 80 MG/1
80 TABLET ORAL EVERY 12 HOURS SCHEDULED
Status: DISCONTINUED | OUTPATIENT
Start: 2024-07-27 | End: 2024-07-27

## 2024-07-27 RX ADMIN — ATORVASTATIN CALCIUM 40 MG: 40 TABLET, FILM COATED ORAL at 20:33

## 2024-07-27 RX ADMIN — METOPROLOL TARTRATE 25 MG: 25 TABLET, FILM COATED ORAL at 20:33

## 2024-07-27 RX ADMIN — METOPROLOL TARTRATE 25 MG: 25 TABLET, FILM COATED ORAL at 08:05

## 2024-07-27 RX ADMIN — Medication 10 ML: at 08:05

## 2024-07-27 RX ADMIN — FUROSEMIDE 40 MG: 40 TABLET ORAL at 08:04

## 2024-07-27 RX ADMIN — EMPAGLIFLOZIN 10 MG: 10 TABLET, FILM COATED ORAL at 08:05

## 2024-07-27 RX ADMIN — ASPIRIN 81 MG: 81 TABLET, COATED ORAL at 08:05

## 2024-07-27 RX ADMIN — RIVAROXABAN 20 MG: 20 TABLET, FILM COATED ORAL at 17:05

## 2024-07-27 RX ADMIN — CETIRIZINE HYDROCHLORIDE 10 MG: 10 TABLET, FILM COATED ORAL at 08:05

## 2024-07-27 RX ADMIN — Medication 10 ML: at 20:33

## 2024-07-27 NOTE — PLAN OF CARE
Problem: Adult Inpatient Plan of Care  Goal: Absence of Hospital-Acquired Illness or Injury  Intervention: Prevent Skin Injury  Recent Flowsheet Documentation  Taken 7/26/2024 1904 by Omar Adams RN  Body Position: position changed independently  Skin Protection: adhesive use limited     Problem: Adult Inpatient Plan of Care  Goal: Absence of Hospital-Acquired Illness or Injury  Intervention: Prevent and Manage VTE (Venous Thromboembolism) Risk  Recent Flowsheet Documentation  Taken 7/26/2024 1904 by Omar Adams, RN  Activity Management: up ad dante  VTE Prevention/Management: (See MAR) other (see comments)   Goal Outcome Evaluation:

## 2024-07-27 NOTE — PLAN OF CARE
Goal Outcome Evaluation:  Plan of Care Reviewed With: patient   Will continue with plan of care.       Problem: Adult Inpatient Plan of Care  Goal: Plan of Care Review  Outcome: Ongoing, Progressing  Plan of Care Reviewed With: patient

## 2024-07-27 NOTE — PROGRESS NOTES
[facility] HOSPITALIST PROGRESS NOTE     Patient Identification:  Name:  Con Colón  Age:  49 y.o.  Sex:  male  :  1975  MRN:  [id number]  Visit Number:  [id number]  ROOM: [address] [place] [address]     Primary Care Provider:  Halie Dubois APRN    Length of stay in inpatient status:  2    Subjective     Chief Compliant:    Chief Complaint   Patient presents with    Syncope    Shortness of Breath       History of Presenting Illness:    Patient denies any new complaints. He has been getting hypoxic on home CPAP per nursing staff. He continues to take off O2 during the day and not be hypoxic.     ROS:  Otherwise 10 point ROS negative other than documented above in HPI.     Objective     Current Hospital Meds:aspirin, 81 mg, Oral, Daily  atorvastatin, 40 mg, Oral, Nightly  budesonide-formoterol, 2 puff, Inhalation, BID - RT  cetirizine, 10 mg, Oral, Daily  cholecalciferol, 50,000 Units, Oral, Q7 Days  empagliflozin, 10 mg, Oral, Daily  furosemide, 40 mg, Oral, Daily  insulin lispro, 2-9 Units, Subcutaneous, 4x Daily AC & at Bedtime  metoprolol tartrate, 25 mg, Oral, Q12H  Pharmacy Consult, , Does not apply, Once  rivaroxaban, 20 mg, Oral, Daily With Dinner  sodium chloride, 10 mL, Intravenous, Q12H         Current Antimicrobial Therapy:  Anti-Infectives (From admission, onward)      None          Current Diuretic Therapy:  Diuretics (From admission, onward)      Ordered     Dose/Rate Route Frequency Start Stop    24 1120  furosemide (LASIX) injection 40 mg        Ordering Provider: Davey Rubio MD    40 mg Intravenous Once 24 1215 24 1145    24 1035  furosemide (LASIX) tablet 40 mg        Ordering Provider: Davey Rubio MD    40 mg Oral Daily 24 1100      24 1424  furosemide (LASIX) injection 40 mg        Ordering Provider: Davey Rubio MD    40 mg Intravenous Once 24 1440 24 1428           ----------------------------------------------------------------------------------------------------------------------  Vital Signs:  Temp:  [97.8 °F (36.6 °C)-98.6 °F (37 °C)] 98.6 °F (37 °C)  Heart Rate:  [110-118] 117  Resp:  [18-20] 20  BP: (105-125)/(68-92) 125/89  SpO2:  [92 %-98 %] 98 %  on  Flow (L/min):  [2.5] 2.5;   Device (Oxygen Therapy): room air  Body mass index is 52.48 kg/m².    Wt Readings from Last 3 Encounters:   07/27/24 (!) 171 kg (376 lb 1.7 oz)   07/18/24 (!) 172 kg (379 lb 3.1 oz)   07/16/24 (!) 172 kg (379 lb 13.6 oz)     Intake & Output (last 3 days)         07/24 0701 07/25 0700 07/25 0701 07/26 0700 07/26 0701 07/27 0700 07/27 0701 07/28 0700    P.O.  440 1200 480    Total Intake(mL/kg)  440 (2.6) 1200 (7) 480 (2.8)    Net  +440 +1200 +480            Urine Unmeasured Occurrence  7 x 5 x           Diet: Cardiac, Diabetic; Healthy Heart (2-3 Na+); Consistent Carbohydrate; Fluid Consistency: Thin (IDDSI 0)  ----------------------------------------------------------------------------------------------------------------------  Physical exam:  Constitutional:  Well-developed and well-nourished.  No respiratory distress. Obese.       HENT:  Head:  Normocephalic and atraumatic.  Mouth:  Moist mucous membranes.    Eyes:  Conjunctivae and EOM are normal. No scleral icterus.    Neck:  Neck supple.  No JVD present.    Cardiovascular:  Normal rate, regular rhythm and normal heart sounds with no murmur.  Pulmonary/Chest:  No respiratory distress, no wheezes, no crackles, with normal breath sounds and good air movement.  Abdominal:  Soft.  Bowel sounds are normal.  No distension and no tenderness.   Musculoskeletal:  No edema, no tenderness, and no deformity.  No red or swollen joints anywhere.    Neurological:  Alert and oriented to person, place, and time.  No cranial nerve deficit.  No tongue deviation.  No facial droop.  No slurred speech.   Skin:  Skin is warm and dry. No rash noted. No  "pallor.   Peripheral vascular:  Pulses in all 4 extremities with no clubbing, no cyanosis, no edema.  ----------------------------------------------------------------------------------------------------------------------  Tele:    ----------------------------------------------------------------------------------------------------------------------  Results from last 7 days   Lab Units 07/26/24  0538 07/25/24  1038   CRP mg/dL  --  0.33   WBC 10*3/mm3 7.20 7.55   HEMOGLOBIN g/dL 15.4 16.0   HEMATOCRIT % 48.8 50.6   MCV fL 94.2 93.7   MCHC g/dL 31.6 31.6   PLATELETS 10*3/mm3 142 151   INR   --  2.31*         Results from last 7 days   Lab Units 07/27/24  1434 07/27/24  0037 07/26/24  1607 07/26/24  0538 07/25/24  1038   SODIUM mmol/L 139 139  --  136 144   POTASSIUM mmol/L 4.5 4.1 3.8 3.6 4.2   MAGNESIUM mg/dL 2.1 2.0  --  2.0  --    CHLORIDE mmol/L 104 103  --  104 108*   CO2 mmol/L 25.2 26.1  --  20.6* 23.4   BUN mg/dL 18 18  --  21* 22*   CREATININE mg/dL 1.10 0.98  --  1.09 1.19   CALCIUM mg/dL 9.4 9.2  --  9.1 9.2   GLUCOSE mg/dL 113* 99  --  125* 98   ALBUMIN g/dL  --   --   --   --  3.9   BILIRUBIN mg/dL  --   --   --   --  1.0   ALK PHOS U/L  --   --   --   --  113   AST (SGOT) U/L  --   --   --   --  18   ALT (SGPT) U/L  --   --   --   --  14   Estimated Creatinine Clearance: 133.9 mL/min (by C-G formula based on SCr of 1.1 mg/dL).  No results found for: \"AMMONIA\"  Results from last 7 days   Lab Units 07/25/24  1730 07/25/24  1247 07/25/24  1038   HSTROP T ng/L 28* 33* 33*     Results from last 7 days   Lab Units 07/25/24  1038   PROBNP pg/mL 2,468.0*         Glucose   Date/Time Value Ref Range Status   07/27/2024 1629 103 70 - 130 mg/dL Final   07/27/2024 1054 137 (H) 70 - 130 mg/dL Final   07/27/2024 0607 110 70 - 130 mg/dL Final   07/26/2024 1902 140 (H) 70 - 130 mg/dL Final   07/26/2024 1644 106 70 - 130 mg/dL Final   07/26/2024 1107 86 70 - 130 mg/dL Final   07/26/2024 0708 102 70 - 130 mg/dL Final " "  07/25/2024 1918 154 (H) 70 - 130 mg/dL Final     Lab Results   Component Value Date    TSH 1.630 01/26/2022     No results found for: \"PREGTESTUR\", \"PREGSERUM\", \"HCG\", \"HCGQUANT\"  Pain Management Panel          Latest Ref Rng & Units 7/25/2024   Pain Management Panel   Amphetamine, Urine Qual Negative Negative    Barbiturates Screen, Urine Negative Negative    Benzodiazepine Screen, Urine Negative Negative    Buprenorphine, Screen, Urine Negative Negative    Cocaine Screen, Urine Negative Negative    Fentanyl, Urine Negative Negative    Methadone Screen , Urine Negative Negative    Methamphetamine, Ur Negative Negative       Details                 Brief Urine Lab Results       None          No results found for: \"BLOODCX\"  No results found for: \"URINECX\"  No results found for: \"WOUNDCX\"  No results found for: \"STOOLCX\"  No results found for: \"RESPCX\"  No results found for: \"AFBCX\"  Results from last 7 days   Lab Units 07/25/24  1038   CRP mg/dL 0.33       I have personally looked at the labs and they are summarized above.  ----------------------------------------------------------------------------------------------------------------------  Detailed radiology reports for the last 24 hours:    Imaging Results (Last 24 Hours)       ** No results found for the last 24 hours. **          Assessment & Plan    #New on onset atrial flutter w/ RVR  - Already on xarelto.   - Echo pending   - Patient still win aflutter with 2:1 AV block  - Cardiology starting sotolol with QTC monitoring. Currently stable at 509./   - Monitor on tele.      #Hypoxia on presentation   #Exertional dyspnea  #HFpEF  #NELY  #Suspected obesity hypoventilation   - No longer hypoxic and refusing supplemental O2 or ABG  - ProBNP elevated and mild overload on plain film of chest, improved with diuresis, continue oral maintenance as per cardiology.   - Continue GDMT with BB, jardiance   - Monitor renal function and electrolytes   - Continue nightly " CPAP, may have to use machine with O2 capabilities.   - Monitor on tele with continuous pulse oximetry      #Recent NSTEMI, normal stress test, abnormal CTA  #Chronic troponin elevation   - Troponin 33=>33. Denies chest pain. No significant change on EKG.   - Cardiology consulted for recommendations on further ischemic w/u.      #DM II  - SSI and titrate as needed. Well controlled.      #HTN  #HLD  - Restart home regimen      #Hx of PE  - Continue home xarelto      Code status: Full      Dispo: Admit to tele     Davey Rubio MD  [facility] Hospitalist  07/27/24  18:40 EDT

## 2024-07-27 NOTE — PROGRESS NOTES
[facility]  PROGRESS NOTE    Patient information:  Name: Con Colón  Age/Sex: 49 y.o. male  :  1975        PCP: Halie Dubois APRN  Attending: Davey Rubio MD  MRN:  [id number]  Visit Number:  [id number]    LOS: 2  CODE STATUS:    Code Status and Medical Interventions: CPR (Attempt to Resuscitate); Full Support   Ordered at: 24 1425     Level Of Support Discussed With:    Patient     Code Status (Patient has no pulse and is not breathing):    CPR (Attempt to Resuscitate)     Medical Interventions (Patient has pulse or is breathing):    Full Support       PROBLEM LIST:Principal Problem:    Acute respiratory failure with hypoxia      Reason for Cardiology follow-up: Consideration for left heart cath    Subjective   ADMISSION INFORMATION:  Chief Complaint   Patient presents with    Syncope    Shortness of Breath       DATE:2024    HPI:    Con Colón is a 49 y.o. male with a past medical history significant for nonobstructive coronary artery disease with recent CT coronary angiogram showing multivessel nonobstructive CAD with worst area being 50 to 70% stenosis in the left circumflex artery with total calcium score of 1200, HFpEF w EF of 66-70% (2024), NELY, Cardiomegaly, DMT2, HLD, HTN, COPD, history of PE anticoagulated with Xarelto and morbid obesity with a BMI of 53.     Patient presented to [facility] ([facility] [place]) emergency room (ER) on 2024 with complaints of Dyspnea and unable to walk 10 yards.      Primary Cardiologist has been JORI Moran and he was last seen in the office on 2024.     Interval History:   Patient was in room 302A when he was seen and examined.  Patient sitting at bedside. He denies complaint of chest pain, dyspnea, palpitations, swelling or syncope.  BP this a.m. 105/76, heart rate 116.  A.m. labs with potassium of 4.1, creatinine 0.98.  Per nursing notes no new events overnight.           Objective     Vital Signs  Temp:  [97.7 °F (36.5 °C)-98 °F (36.7 °C)] 98 °F (36.7 °C)  Heart Rate:  [110-117] 116  Resp:  [18-20] 20  BP: (105-124)/(68-92) 105/76  Flow (L/min):  [2.5] 2.5  Device (Oxygen Therapy): nasal cannula;CPAP  Vital Signs (last 72 hrs)         07/24 0700 07/25 0659 07/25 0700 07/26 0659 07/26 0700 07/27 0659 07/27 0700 07/27 0809   Most Recent      Temp (°F)   97.6 -  98.8    97.7 -  98       98 (36.7) 07/27 0610    Heart Rate   60 -  74    106 -  117       116 07/27 0610    Resp   16 -  20    18 -  20       20 07/27 0610    BP   92/68 -  123/82    105/76 -  124/92       105/76 07/27 0610    SpO2 (%)   87 -  94    92 -  96       94 07/27 0610    Flow (L/min)   2 -  2.5      2.5       2.5 07/27 0253          BMI:Body mass index is 52.48 kg/m².    WEIGHT:      07/25/24  1508 07/26/24  0500 07/27/24  0500   Weight: (!) 172 kg (380 lb) (!) 172 kg (380 lb 1.6 oz) (!) 171 kg (376 lb 1.7 oz)       DIET:Diet: Cardiac, Diabetic; Healthy Heart (2-3 Na+); Consistent Carbohydrate; Fluid Consistency: Thin (IDDSI 0)    I&O:  Intake & Output (last 3 days)         07/24 0701 07/25 0700 07/25 0701 07/26 0700 07/26 0701 07/27 0700 07/27 0701 07/28 0700    P.O.  440 1200     Total Intake(mL/kg)  440 (2.6) 1200 (7)     Net  +440 +1200             Urine Unmeasured Occurrence  7 x 5 x              PHYSICAL EXAM:  Vitals reviewed.   Constitutional:       Appearance: Normal appearance. Well-developed. Obese.   Eyes:      Conjunctiva/sclera: Conjunctivae normal.   HENT:      Head: Normocephalic.   Neck:      Thyroid: No thyromegaly.      Vascular: No carotid bruit or JVD.   Pulmonary:      Effort: Pulmonary effort is normal.      Breath sounds: Normal breath sounds.   Cardiovascular:      Normal rate. Regular rhythm.   Edema:     Peripheral edema present.  Abdominal:      General: Bowel sounds are normal.      Palpations: Abdomen is soft. There is no hepatomegaly or splenomegaly.      Tenderness: There is  no abdominal tenderness.   Musculoskeletal: Normal range of motion.      Cervical back: Neck supple. Skin:     General: Skin is warm and dry.      Comments: Stasis dermatitis changes noted to LE, bilaterally   Neurological:      Mental Status: Alert and oriented to person, place, and time.   Psychiatric:         Mood and Affect: Mood normal.         Speech: Speech normal.         Behavior: Behavior normal. Behavior is cooperative.                    Results review   Results Review:    I have reviewed the patient's new clinical results. 07/27/24 08:09 EDT    Results from last 7 days   Lab Units 07/25/24  1730 07/25/24  1247 07/25/24  1038   HSTROP T ng/L 28* 33* 33*     Lab Results   Component Value Date    PROBNP 2,468.0 (H) 07/25/2024     Results from last 7 days   Lab Units 07/26/24  0538 07/25/24  1038   WBC 10*3/mm3 7.20 7.55   HEMOGLOBIN g/dL 15.4 16.0   PLATELETS 10*3/mm3 142 151     Results from last 7 days   Lab Units 07/27/24  0037 07/26/24  1607 07/26/24  0538 07/25/24  1038   SODIUM mmol/L 139  --  136 144   POTASSIUM mmol/L 4.1 3.8 3.6 4.2   CHLORIDE mmol/L 103  --  104 108*   CO2 mmol/L 26.1  --  20.6* 23.4   BUN mg/dL 18  --  21* 22*   CREATININE mg/dL 0.98  --  1.09 1.19   CALCIUM mg/dL 9.2  --  9.1 9.2   GLUCOSE mg/dL 99  --  125* 98   ALT (SGPT) U/L  --   --   --  14   AST (SGOT) U/L  --   --   --  18     Lab Results   Component Value Date    MG 2.0 07/27/2024    MG 2.0 07/26/2024    MG 1.92 12/13/2019     Estimated Creatinine Clearance: 150.3 mL/min (by C-G formula based on SCr of 0.98 mg/dL).    Lab Results   Component Value Date    HGBA1C 6.50 (H) 07/15/2024    HGBA1C 7.19 (H) 01/26/2022    HGBA1C 6.40 (H) 07/26/2021     Lab Results   Component Value Date    CHOL 120 01/26/2022    TRIG 49 01/26/2022    LDL 77 01/26/2022    HDL 32 (L) 01/26/2022     Lab Results   Component Value Date    ABSOLUTELUNG 35 07/26/2024     Lab Results   Component Value Date    INR 2.31 (H) 07/25/2024    INR 1.33 (H)  07/13/2024    INR 1.02 (L) 11/16/2020    INR 1.37 (L) 01/16/2020    INR 1.21 (L) 12/13/2019    INR 1.21 (L) 12/12/2019    INR 1.21 (L) 12/10/2019     Lab Results   Component Value Date    LABHEPA 0.65 07/26/2024    LABHEPA 0.96 (H) 07/26/2024    LABHEPA >1.10 (C) 07/25/2024    LABHEPA >1.10 (C) 07/25/2024       Lab Results   Component Value Date    TSH 1.630 01/26/2022      Pain Management Panel          Latest Ref Rng & Units 7/25/2024   Pain Management Panel   Amphetamine, Urine Qual Negative Negative    Barbiturates Screen, Urine Negative Negative    Benzodiazepine Screen, Urine Negative Negative    Buprenorphine, Screen, Urine Negative Negative    Cocaine Screen, Urine Negative Negative    Fentanyl, Urine Negative Negative    Methadone Screen , Urine Negative Negative    Methamphetamine, Ur Negative Negative       Details                 Microbiology Results (last 10 days)       ** No results found for the last 240 hours. **           Imaging Results (Last 24 Hours)       ** No results found for the last 24 hours. **          ECHO:  Results for orders placed during the hospital encounter of 07/25/24    Adult Transthoracic Echo Limited W/ Cont if Necessary Per Protocol    Interpretation Summary    The right ventricular cavity is dilated.    The right atrial cavity is dilated.    Very limited study  poor acoustic window  Cannot comment about wall motion abnormality  Patient is tachycardic  Right ventricle is very dilated  Echo window is not adequate to comment about ejection fraction.  Endocardium thickening cannot be evaluated,  Will recommend repeat echo with contrast or try to obtain limited echo with multiple color that can expose the endocardium and ejection fraction can be evaluated      STRESS TEST:  Results for orders placed during the hospital encounter of 07/13/24    Stress Test With Myocardial Perfusion One Day    Interpretation Summary    Myocardial perfusion imaging indicates a normal myocardial  perfusion study with no evidence of ischemia.    Left ventricular ejection fraction is normal (Calculated EF = 61%).    TID 1.12.    Findings consistent with a normal ECG stress test.       HEART CATH:  No results found for this or any previous visit.      TELEMETRY:            I reviewed the patient's new clinical results.    ALLERGIES: Nuvigil [armodafinil] and Provigil [modafinil]    Medication Review:   Current list of medications may not reflect those currently placed in orders that are not signed or are being held.     aspirin, 81 mg, Oral, Daily  atorvastatin, 40 mg, Oral, Nightly  budesonide-formoterol, 2 puff, Inhalation, BID - RT  cetirizine, 10 mg, Oral, Daily  cholecalciferol, 50,000 Units, Oral, Q7 Days  empagliflozin, 10 mg, Oral, Daily  furosemide, 40 mg, Oral, Daily  insulin lispro, 2-9 Units, Subcutaneous, 4x Daily AC & at Bedtime  metoprolol tartrate, 25 mg, Oral, Q12H  rivaroxaban, 20 mg, Oral, Daily With Dinner  sodium chloride, 10 mL, Intravenous, Q12H           albuterol    senna-docusate sodium **AND** polyethylene glycol **AND** bisacodyl **AND** bisacodyl    Calcium Replacement - Follow Nurse / BPA Driven Protocol    dextrose    dextrose    glucagon (human recombinant)    Magnesium Standard Dose Replacement - Follow Nurse / BPA Driven Protocol    nitroglycerin    Phosphorus Replacement - Follow Nurse / BPA Driven Protocol    Potassium Replacement - Follow Nurse / BPA Driven Protocol    [COMPLETED] Insert Peripheral IV **AND** sodium chloride    sodium chloride    sodium chloride    Assessment    Acute on chronic HFpEF, improving  New onset atrial flutter with rapid ventricular response.  [id number] score of 3 for hypertension, type 2 diabetes mellitus and heart failure.  Severe pulmonary hypertension noted on recent echo Doppler study, could be due to likely undiagnosed NELY  Morbid obesity with a BMI of 53   Type 2 diabetes mellitus            Recommendations / Plan   Start sotalol  monitoring QTc on EKG closely  Maintain potassium levels between 4 and 5 and magnesium levels between 2 and 2.2  Approaching euvolemia, will continue oral diuretics.  Continue beta-blocker and Jardiance  Chronically anticoagulated on Xarelto, will plan to continue  Troponin levels have been flat trend with patient not having anginal symptoms and recent CT coronary angiogram revealing nonobstructive disease, no indication for invasive coronary angiography at this time. continue aggressive risk factor modification.   Avoid other QT prolonging drugs  Sleep evaluation as an outpatient  Consider electrocardioversion in the future if patient has persistent atrial flutter          I have discussed the patients findings and recommendations with the patient.    Thank you very much for asking us to be involved in this patient's care.  We will follow along with you.          Electronically signed by JACEK Jackson, 07/27/24, 8:23 AM EDT.              Please note that portions of this note were completed with a voice recognition program.    Please note that portions of this note were copied and has been reviewed and is accurate as of 7/27/2024 .

## 2024-07-28 LAB
ANION GAP SERPL CALCULATED.3IONS-SCNC: 12.4 MMOL/L (ref 5–15)
BASOPHILS # BLD AUTO: 0.06 10*3/MM3 (ref 0–0.2)
BASOPHILS NFR BLD AUTO: 0.7 % (ref 0–1.5)
BUN SERPL-MCNC: 19 MG/DL (ref 6–20)
BUN/CREAT SERPL: 17.6 (ref 7–25)
CALCIUM SPEC-SCNC: 9.3 MG/DL (ref 8.6–10.5)
CHLORIDE SERPL-SCNC: 105 MMOL/L (ref 98–107)
CO2 SERPL-SCNC: 23.6 MMOL/L (ref 22–29)
CREAT SERPL-MCNC: 1.08 MG/DL (ref 0.76–1.27)
DEPRECATED RDW RBC AUTO: 51.6 FL (ref 37–54)
EGFRCR SERPLBLD CKD-EPI 2021: 85.2 ML/MIN/1.73
EOSINOPHIL # BLD AUTO: 0.26 10*3/MM3 (ref 0–0.4)
EOSINOPHIL NFR BLD AUTO: 3.2 % (ref 0.3–6.2)
ERYTHROCYTE [DISTWIDTH] IN BLOOD BY AUTOMATED COUNT: 15 % (ref 12.3–15.4)
GLUCOSE BLDC GLUCOMTR-MCNC: 118 MG/DL (ref 70–130)
GLUCOSE BLDC GLUCOMTR-MCNC: 122 MG/DL (ref 70–130)
GLUCOSE BLDC GLUCOMTR-MCNC: 130 MG/DL (ref 70–130)
GLUCOSE BLDC GLUCOMTR-MCNC: 131 MG/DL (ref 70–130)
GLUCOSE SERPL-MCNC: 143 MG/DL (ref 65–99)
HCT VFR BLD AUTO: 51.4 % (ref 37.5–51)
HGB BLD-MCNC: 15.8 G/DL (ref 13–17.7)
IMM GRANULOCYTES # BLD AUTO: 0.03 10*3/MM3 (ref 0–0.05)
IMM GRANULOCYTES NFR BLD AUTO: 0.4 % (ref 0–0.5)
LYMPHOCYTES # BLD AUTO: 1.98 10*3/MM3 (ref 0.7–3.1)
LYMPHOCYTES NFR BLD AUTO: 24.4 % (ref 19.6–45.3)
MAGNESIUM SERPL-MCNC: 2.1 MG/DL (ref 1.6–2.6)
MCH RBC QN AUTO: 28.9 PG (ref 26.6–33)
MCHC RBC AUTO-ENTMCNC: 30.7 G/DL (ref 31.5–35.7)
MCV RBC AUTO: 94 FL (ref 79–97)
MONOCYTES # BLD AUTO: 0.6 10*3/MM3 (ref 0.1–0.9)
MONOCYTES NFR BLD AUTO: 7.4 % (ref 5–12)
NEUTROPHILS NFR BLD AUTO: 5.18 10*3/MM3 (ref 1.7–7)
NEUTROPHILS NFR BLD AUTO: 63.9 % (ref 42.7–76)
NRBC BLD AUTO-RTO: 0 /100 WBC (ref 0–0.2)
PLATELET # BLD AUTO: 148 10*3/MM3 (ref 140–450)
PMV BLD AUTO: 10.6 FL (ref 6–12)
POTASSIUM SERPL-SCNC: 4.2 MMOL/L (ref 3.5–5.2)
QT INTERVAL: 362 MS
QT INTERVAL: 370 MS
QTC INTERVAL: 509 MS
QTC INTERVAL: 520 MS
RBC # BLD AUTO: 5.47 10*6/MM3 (ref 4.14–5.8)
SODIUM SERPL-SCNC: 141 MMOL/L (ref 136–145)
WBC NRBC COR # BLD AUTO: 8.11 10*3/MM3 (ref 3.4–10.8)

## 2024-07-28 PROCEDURE — 94799 UNLISTED PULMONARY SVC/PX: CPT

## 2024-07-28 PROCEDURE — 85025 COMPLETE CBC W/AUTO DIFF WBC: CPT | Performed by: INTERNAL MEDICINE

## 2024-07-28 PROCEDURE — 93005 ELECTROCARDIOGRAM TRACING: CPT | Performed by: INTERNAL MEDICINE

## 2024-07-28 PROCEDURE — 93010 ELECTROCARDIOGRAM REPORT: CPT | Performed by: INTERNAL MEDICINE

## 2024-07-28 PROCEDURE — 99232 SBSQ HOSP IP/OBS MODERATE 35: CPT | Performed by: NURSE PRACTITIONER

## 2024-07-28 PROCEDURE — 99232 SBSQ HOSP IP/OBS MODERATE 35: CPT | Performed by: INTERNAL MEDICINE

## 2024-07-28 PROCEDURE — 94664 DEMO&/EVAL PT USE INHALER: CPT

## 2024-07-28 PROCEDURE — 25010000002 DIGOXIN PER 500 MCG: Performed by: NURSE PRACTITIONER

## 2024-07-28 PROCEDURE — 82948 REAGENT STRIP/BLOOD GLUCOSE: CPT

## 2024-07-28 PROCEDURE — 93005 ELECTROCARDIOGRAM TRACING: CPT | Performed by: NURSE PRACTITIONER

## 2024-07-28 PROCEDURE — 80048 BASIC METABOLIC PNL TOTAL CA: CPT | Performed by: NURSE PRACTITIONER

## 2024-07-28 PROCEDURE — 83735 ASSAY OF MAGNESIUM: CPT | Performed by: INTERNAL MEDICINE

## 2024-07-28 RX ORDER — DIGOXIN 0.25 MG/ML
250 INJECTION INTRAMUSCULAR; INTRAVENOUS EVERY 6 HOURS
Status: COMPLETED | OUTPATIENT
Start: 2024-07-28 | End: 2024-07-29

## 2024-07-28 RX ADMIN — CETIRIZINE HYDROCHLORIDE 10 MG: 10 TABLET, FILM COATED ORAL at 08:08

## 2024-07-28 RX ADMIN — Medication 10 ML: at 20:19

## 2024-07-28 RX ADMIN — Medication 10 ML: at 08:09

## 2024-07-28 RX ADMIN — BUDESONIDE AND FORMOTEROL FUMARATE DIHYDRATE 2 PUFF: 160; 4.5 AEROSOL RESPIRATORY (INHALATION) at 18:26

## 2024-07-28 RX ADMIN — FUROSEMIDE 40 MG: 40 TABLET ORAL at 08:09

## 2024-07-28 RX ADMIN — DIGOXIN 250 MCG: 0.25 INJECTION INTRAMUSCULAR; INTRAVENOUS at 16:12

## 2024-07-28 RX ADMIN — ATORVASTATIN CALCIUM 40 MG: 40 TABLET, FILM COATED ORAL at 20:19

## 2024-07-28 RX ADMIN — ASPIRIN 81 MG: 81 TABLET, COATED ORAL at 08:08

## 2024-07-28 RX ADMIN — RIVAROXABAN 20 MG: 20 TABLET, FILM COATED ORAL at 18:05

## 2024-07-28 RX ADMIN — METOPROLOL TARTRATE 25 MG: 25 TABLET, FILM COATED ORAL at 20:19

## 2024-07-28 RX ADMIN — BUDESONIDE AND FORMOTEROL FUMARATE DIHYDRATE 2 PUFF: 160; 4.5 AEROSOL RESPIRATORY (INHALATION) at 08:38

## 2024-07-28 RX ADMIN — DIGOXIN 250 MCG: 0.25 INJECTION INTRAMUSCULAR; INTRAVENOUS at 10:01

## 2024-07-28 RX ADMIN — EMPAGLIFLOZIN 10 MG: 10 TABLET, FILM COATED ORAL at 08:09

## 2024-07-28 RX ADMIN — METOPROLOL TARTRATE 25 MG: 25 TABLET, FILM COATED ORAL at 08:08

## 2024-07-28 RX ADMIN — DIGOXIN 250 MCG: 0.25 INJECTION INTRAMUSCULAR; INTRAVENOUS at 23:44

## 2024-07-28 NOTE — PROGRESS NOTES
[facility] HOSPITALIST PROGRESS NOTE     Patient Identification:  Name:  Con Colón  Age:  49 y.o.  Sex:  male  :  1975  MRN:  [id number]  Visit Number:  [id number]  ROOM: [address] [place] [address]     Primary Care Provider:  Halie Dubois APRN    Length of stay in inpatient status:  3    Subjective     Chief Compliant:    Chief Complaint   Patient presents with    Syncope    Shortness of Breath       History of Presenting Illness:    Patient denies any new complaints. Hopeful to go home soon. Tele on my exam showed what appeared to be sinus rhythm in 90's.     ROS:  Otherwise 10 point ROS negative other than documented above in HPI.     Objective     Current Hospital Meds:aspirin, 81 mg, Oral, Daily  atorvastatin, 40 mg, Oral, Nightly  budesonide-formoterol, 2 puff, Inhalation, BID - RT  cetirizine, 10 mg, Oral, Daily  cholecalciferol, 50,000 Units, Oral, Q7 Days  digoxin, 250 mcg, Intravenous, Q6H  empagliflozin, 10 mg, Oral, Daily  furosemide, 40 mg, Oral, Daily  insulin lispro, 2-9 Units, Subcutaneous, 4x Daily AC & at Bedtime  metoprolol tartrate, 25 mg, Oral, Q12H  Pharmacy Consult, , Does not apply, Once  rivaroxaban, 20 mg, Oral, Daily With Dinner  sodium chloride, 10 mL, Intravenous, Q12H         Current Antimicrobial Therapy:  Anti-Infectives (From admission, onward)      None          Current Diuretic Therapy:  Diuretics (From admission, onward)      Ordered     Dose/Rate Route Frequency Start Stop    24 1120  furosemide (LASIX) injection 40 mg        Ordering Provider: Davey Rubio MD    40 mg Intravenous Once 24 1215 24 1145    24 1035  furosemide (LASIX) tablet 40 mg        Ordering Provider: Davey Rubio MD    40 mg Oral Daily 24 1100      24 1424  furosemide (LASIX) injection 40 mg        Ordering Provider: Davey Rubio MD    40 mg Intravenous Once 24 1440 24 1428           ----------------------------------------------------------------------------------------------------------------------  Vital Signs:  Temp:  [96.6 °F (35.9 °C)-98.7 °F (37.1 °C)] 96.6 °F (35.9 °C)  Heart Rate:  [] 118  Resp:  [20] 20  BP: ()/(62-83) 93/65  SpO2:  [90 %-96 %] 93 %  on   ;   Device (Oxygen Therapy): room air  Body mass index is 52.41 kg/m².    Wt Readings from Last 3 Encounters:   07/28/24 (!) 170 kg (375 lb 9.6 oz)   07/18/24 (!) 172 kg (379 lb 3.1 oz)   07/16/24 (!) 172 kg (379 lb 13.6 oz)     Intake & Output (last 3 days)         07/26 0701 07/27 0700 07/27 0701 07/28 0700 07/28 0701 07/29 0700    P.O. 1200 480 600    Total Intake(mL/kg) 1200 (7) 480 (2.8) 600 (3.5)    Net +1200 +480 +600           Urine Unmeasured Occurrence 5 x 1 x 5 x    Stool Unmeasured Occurrence   1 x          Diet: Cardiac, Diabetic; Healthy Heart (2-3 Na+); Consistent Carbohydrate; Fluid Consistency: Thin (IDDSI 0)  ----------------------------------------------------------------------------------------------------------------------  Physical exam:  Constitutional:  Well-developed and well-nourished.  No respiratory distress.      HENT:  Head:  Normocephalic and atraumatic.  Mouth:  Moist mucous membranes.    Eyes:  Conjunctivae and EOM are normal. No scleral icterus.    Neck:  Neck supple.  No JVD present.    Cardiovascular:  Normal rate, regular rhythm and normal heart sounds with no murmur.  Pulmonary/Chest:  No respiratory distress, no wheezes, no crackles, with normal breath sounds and good air movement.  Abdominal:  Soft.  Bowel sounds are normal.  No distension and no tenderness.   Musculoskeletal:  No edema, no tenderness, and no deformity.  No red or swollen joints anywhere.    Neurological:  Alert and oriented to person, place, and time.  No cranial nerve deficit.  No tongue deviation.  No facial droop.  No slurred speech.   Skin:  Skin is warm and dry. No rash noted. No pallor.   Peripheral  "vascular:  Pulses in all 4 extremities with no clubbing, no cyanosis, no edema.  ----------------------------------------------------------------------------------------------------------------------  Tele:    ----------------------------------------------------------------------------------------------------------------------  Results from last 7 days   Lab Units 07/28/24  0122 07/26/24  0538 07/25/24  1038   CRP mg/dL  --   --  0.33   WBC 10*3/mm3 8.11 7.20 7.55   HEMOGLOBIN g/dL 15.8 15.4 16.0   HEMATOCRIT % 51.4* 48.8 50.6   MCV fL 94.0 94.2 93.7   MCHC g/dL 30.7* 31.6 31.6   PLATELETS 10*3/mm3 148 142 151   INR   --   --  2.31*         Results from last 7 days   Lab Units 07/28/24  0122 07/27/24  1434 07/27/24  0037 07/26/24  0538 07/25/24  1038   SODIUM mmol/L 141 139 139   < > 144   POTASSIUM mmol/L 4.2 4.5 4.1   < > 4.2   MAGNESIUM mg/dL 2.1 2.1 2.0   < >  --    CHLORIDE mmol/L 105 104 103   < > 108*   CO2 mmol/L 23.6 25.2 26.1   < > 23.4   BUN mg/dL 19 18 18   < > 22*   CREATININE mg/dL 1.08 1.10 0.98   < > 1.19   CALCIUM mg/dL 9.3 9.4 9.2   < > 9.2   GLUCOSE mg/dL 143* 113* 99   < > 98   ALBUMIN g/dL  --   --   --   --  3.9   BILIRUBIN mg/dL  --   --   --   --  1.0   ALK PHOS U/L  --   --   --   --  113   AST (SGOT) U/L  --   --   --   --  18   ALT (SGPT) U/L  --   --   --   --  14    < > = values in this interval not displayed.   Estimated Creatinine Clearance: 135.1 mL/min (by C-G formula based on SCr of 1.08 mg/dL).  No results found for: \"AMMONIA\"  Results from last 7 days   Lab Units 07/25/24  1730 07/25/24  1247 07/25/24  1038   HSTROP T ng/L 28* 33* 33*     Results from last 7 days   Lab Units 07/25/24  1038   PROBNP pg/mL 2,468.0*         Glucose   Date/Time Value Ref Range Status   07/28/2024 1621 130 70 - 130 mg/dL Final   07/28/2024 1120 131 (H) 70 - 130 mg/dL Final   07/28/2024 0658 118 70 - 130 mg/dL Final   07/27/2024 2037 123 70 - 130 mg/dL Final   07/27/2024 1629 103 70 - 130 mg/dL Final " "  07/27/2024 1054 137 (H) 70 - 130 mg/dL Final   07/27/2024 0607 110 70 - 130 mg/dL Final   07/26/2024 1902 140 (H) 70 - 130 mg/dL Final     Lab Results   Component Value Date    TSH 1.630 01/26/2022     No results found for: \"PREGTESTUR\", \"PREGSERUM\", \"HCG\", \"HCGQUANT\"  Pain Management Panel          Latest Ref Rng & Units 7/25/2024   Pain Management Panel   Amphetamine, Urine Qual Negative Negative    Barbiturates Screen, Urine Negative Negative    Benzodiazepine Screen, Urine Negative Negative    Buprenorphine, Screen, Urine Negative Negative    Cocaine Screen, Urine Negative Negative    Fentanyl, Urine Negative Negative    Methadone Screen , Urine Negative Negative    Methamphetamine, Ur Negative Negative       Details                 Brief Urine Lab Results       None          No results found for: \"BLOODCX\"  No results found for: \"URINECX\"  No results found for: \"WOUNDCX\"  No results found for: \"STOOLCX\"  No results found for: \"RESPCX\"  No results found for: \"AFBCX\"  Results from last 7 days   Lab Units 07/25/24  1038   CRP mg/dL 0.33       I have personally looked at the labs and they are summarized above.  ----------------------------------------------------------------------------------------------------------------------  Detailed radiology reports for the last 24 hours:    Imaging Results (Last 24 Hours)       ** No results found for the last 24 hours. **          Assessment & Plan    #New on onset atrial flutter w/ RVR  - Already on xarelto. At home for hx of VTE  - Monitor on tele.   - Cardiology planned sotolol but not started due to prolonged QTC  - Digoxin load given today. Will monitor response.      #Hypoxia on presentation   #Exertional dyspnea  #HFpEF  #NELY  #Suspected obesity hypoventilation   - CTA chest on admission without evidence of PE  - No longer hypoxic and refusing supplemental O2 during day or ABG  - ProBNP elevated and mild overload on plain film of chest, improved with diuresis, " continue oral maintenance as per cardiology.   - Continue GDMT with BB, jardiance   - Monitor renal function and electrolytes   - Continue nightly CPAP, may have to use machine with O2 capabilities.   - Monitor on tele with continuous pulse oximetry      #Recent NSTEMI, normal stress test, abnormal CTA  #Chronic troponin elevation   - Troponin 33=>33. Denies chest pain. No significant change on EKG.   - Cardiology consulted for recommendations on further ischemic w/u.      #DM II  - SSI and titrate as needed. Well controlled.      #HTN  #HLD  - Restart home regimen      #Hx of PE  - Continue home xarelto      Code status: Full      Dispo: Admit to tele     Davey Rubio MD  [facility] Hospitalist  07/28/24  19:32 EDT

## 2024-07-28 NOTE — PLAN OF CARE
Goal Outcome Evaluation:    Pt is resting on edge of bed with no s/s of distress noted. VSS. IV access maintained. Bi-lateral lower extremities wrapped with Ace per order. Will continued with plan of care.

## 2024-07-28 NOTE — PLAN OF CARE
Problem: Adult Inpatient Plan of Care  Goal: Absence of Hospital-Acquired Illness or Injury  Intervention: Prevent Skin Injury  Recent Flowsheet Documentation  Taken 7/27/2024 1902 by Omar Adams RN  Body Position: position changed independently  Skin Protection: adhesive use limited     Problem: Adult Inpatient Plan of Care  Goal: Absence of Hospital-Acquired Illness or Injury  Intervention: Prevent and Manage VTE (Venous Thromboembolism) Risk  Recent Flowsheet Documentation  Taken 7/27/2024 1902 by Omar Adams, RN  Activity Management: up ad dante  VTE Prevention/Management: (See MAR) other (see comments)   Goal Outcome Evaluation:

## 2024-07-28 NOTE — PROGRESS NOTES
[facility]  PROGRESS NOTE    Patient information:  Name: Con Colón  Age/Sex: 49 y.o. male  :  1975        PCP: Halie Dubois APRN  Attending: Davey Rubio MD  MRN:  [id number]  Visit Number:  [id number]    LOS:  LOS: 3 days     CODE STATUS:    Code Status and Medical Interventions: CPR (Attempt to Resuscitate); Full Support   Ordered at: 24 1425     Level Of Support Discussed With:    Patient     Code Status (Patient has no pulse and is not breathing):    CPR (Attempt to Resuscitate)     Medical Interventions (Patient has pulse or is breathing):    Full Support       PROBLEM LIST:Principal Problem:    Acute respiratory failure with hypoxia      Reason for Cardiology follow-up: Consideration for left heart cath    Subjective   ADMISSION INFORMATION:  Chief Complaint   Patient presents with    Syncope    Shortness of Breath     HPI:  Con Colón is a 49 y.o. male with a past medical history significant for nonobstructive coronary artery disease with recent CT coronary angiogram showing multivessel nonobstructive CAD with worst area being 50 to 70% stenosis in the left circumflex artery with total calcium score of 1200, HFpEF w EF of 66-70% (2024), NELY, Cardiomegaly, DMT2, HLD, HTN, COPD, history of PE anticoagulated with Xarelto and morbid obesity with a BMI of 53.     Patient presented to [facility] ([facility] [place] emergency room (ER) on 2024 with complaints of Dyspnea and unable to walk 10 yards.      Primary Cardiologist has been JORI Moran and he was last seen in the office on 2024.     Interval History:   Patient is in room 302A and was examined.  He is sitting on the side of the bed.  He denies acute cardiovascular complaints.  He feels his breathing continues to improve.  He denies worsening swelling.  Reports urine output is good.  BP this a.m. 103/73, heart rate 117.  A.m. labs with potassium at 4.2,  creatinine 1.08, hemoglobin 15.8.         Objective     Vital Signs:  Temp:  [97.6 °F (36.4 °C)-98.7 °F (37.1 °C)] 98.7 °F (37.1 °C)  Heart Rate:  [] 117  Resp:  [20] 20  BP: (101-142)/(62-93) 103/73  Vital Signs (last 72 hrs)         07/25 0700 07/26 0659 07/26 0700 07/27 0659 07/27 0700 07/28 0659 07/28 0700 07/28 1310   Most Recent      Temp (°F) 97.6 -  98.8    97.7 -  98    97.6 -  98.6    98.5 -  98.7     98.7 (37.1) 07/28 1100    Heart Rate 60 -  74    106 -  117    89 -  119    116 -  119     117 07/28 1100    Resp 16 -  20    18 -  20      20      20     20 07/28 1100    BP 92/68 -  123/82    105/76 -  124/92    101/71 -  142/93    103/73 -  104/62     103/73 07/28 1100    SpO2 (%) 87 -  94    92 -  96    90 -  98    93 -  96     96 07/28 1100    Flow (L/min) 2 -  2.5      2.5         2.5 07/27 0651          BMI:  Body mass index is 52.41 kg/m².    WEIGHT:      07/27/24  0500 07/28/24  0500   Weight: (!) 171 kg (376 lb 1.7 oz) (!) 170 kg (375 lb 9.6 oz)       DIET:  Diet: Cardiac, Diabetic; Healthy Heart (2-3 Na+); Consistent Carbohydrate; Fluid Consistency: Thin (IDDSI 0)    I&O:  Intake & Output (last 3 days)         07/25 0701 07/26 0700 07/26 0701 07/27 0700 07/27 0701 07/28 0700 07/28 0701 07/29 0700    P.O. 440 1200 480 600    Total Intake(mL/kg) 440 (2.6) 1200 (7) 480 (2.8) 600 (3.5)    Net +440 +1200 +480 +600            Urine Unmeasured Occurrence 7 x 5 x 1 x             PHYSICAL EXAM:  Vitals reviewed.   Constitutional:       Appearance: Normal appearance. Well-developed. Obese.   HENT:      Head: Normocephalic.   Neck:      Thyroid: No thyromegaly.      Vascular: No carotid bruit or JVD.   Pulmonary:      Effort: Pulmonary effort is normal.      Breath sounds: Normal breath sounds.   Cardiovascular:      Tachycardia present. Regular rhythm.   Edema:     Peripheral edema present.  Abdominal:      General: Bowel sounds are normal.      Palpations: Abdomen is soft. There is no  hepatomegaly or splenomegaly.      Tenderness: There is no abdominal tenderness.   Musculoskeletal: Normal range of motion.      Cervical back: Neck supple. Skin:     General: Skin is warm and dry.      Comments: Stasis dermatitis changes noted in LE    Neurological:      Mental Status: Alert and oriented to person, place, and time.   Psychiatric:         Attention and Perception: Attention normal.         Mood and Affect: Mood normal.         Speech: Speech normal.         Behavior: Behavior normal. Behavior is cooperative.         Cognition and Memory: Cognition normal.                     Results review   Results Review:    I have reviewed the patient's new clinical results. 07/28/24 13:10 EDT    Results Review:   Results from last 7 days   Lab Units 07/28/24  0122 07/26/24  0538 07/25/24  1038   WBC 10*3/mm3 8.11 7.20 7.55   HEMOGLOBIN g/dL 15.8 15.4 16.0   PLATELETS 10*3/mm3 148 142 151     Results from last 7 days   Lab Units 07/28/24  0122 07/27/24  1434 07/27/24  0037 07/26/24  1607 07/26/24  0538 07/25/24  1038   SODIUM mmol/L 141 139 139  --  136 144   POTASSIUM mmol/L 4.2 4.5 4.1 3.8 3.6 4.2   CHLORIDE mmol/L 105 104 103  --  104 108*   CO2 mmol/L 23.6 25.2 26.1  --  20.6* 23.4   BUN mg/dL 19 18 18  --  21* 22*   CREATININE mg/dL 1.08 1.10 0.98  --  1.09 1.19   CALCIUM mg/dL 9.3 9.4 9.2  --  9.1 9.2   GLUCOSE mg/dL 143* 113* 99  --  125* 98   ALT (SGPT) U/L  --   --   --   --   --  14   AST (SGOT) U/L  --   --   --   --   --  18     Results from last 7 days   Lab Units 07/25/24  1730 07/25/24  1247 07/25/24  1038   HSTROP T ng/L 28* 33* 33*     Lab Results   Component Value Date    PROBNP 2,468.0 (H) 07/25/2024     Estimated Creatinine Clearance: 135.1 mL/min (by C-G formula based on SCr of 1.08 mg/dL).  Lab Results   Component Value Date    ABSOLUTELUNG 35 07/26/2024     Lab Results   Component Value Date    INR 2.31 (H) 07/25/2024    INR 1.33 (H) 07/13/2024    INR 1.02 (L) 11/16/2020    INR 1.37 (L)  "01/16/2020    INR 1.21 (L) 12/13/2019    INR 1.21 (L) 12/12/2019    INR 1.21 (L) 12/10/2019     Lab Results   Component Value Date    LABHEPA 0.65 07/26/2024    LABHEPA 0.96 (H) 07/26/2024    LABHEPA >1.10 (C) 07/25/2024    LABHEPA >1.10 (C) 07/25/2024     Lab Results   Component Value Date    MG 2.1 07/28/2024    MG 2.1 07/27/2024    MG 2.0 07/27/2024     Lab Results   Component Value Date    TSH 1.630 01/26/2022      No results found for: \"CHOL\", \"TRIG\", \"HDL\", \"LDL\"  Pain Management Panel          Latest Ref Rng & Units 7/25/2024   Pain Management Panel   Amphetamine, Urine Qual Negative Negative    Barbiturates Screen, Urine Negative Negative    Benzodiazepine Screen, Urine Negative Negative    Buprenorphine, Screen, Urine Negative Negative    Cocaine Screen, Urine Negative Negative    Fentanyl, Urine Negative Negative    Methadone Screen , Urine Negative Negative    Methamphetamine, Ur Negative Negative       Details                 Microbiology Results (last 10 days)       ** No results found for the last 240 hours. **           Imaging Results (Last 24 Hours)       ** No results found for the last 24 hours. **            ECHO:  Results for orders placed during the hospital encounter of 07/25/24    Adult Transthoracic Echo Limited W/ Cont if Necessary Per Protocol    Interpretation Summary    The right ventricular cavity is dilated.    The right atrial cavity is dilated.    Very limited study  poor acoustic window  Cannot comment about wall motion abnormality  Patient is tachycardic  Right ventricle is very dilated  Echo window is not adequate to comment about ejection fraction.  Endocardium thickening cannot be evaluated,  Will recommend repeat echo with contrast or try to obtain limited echo with multiple color that can expose the endocardium and ejection fraction can be evaluated      STRESS TEST:  Results for orders placed during the hospital encounter of 07/13/24    Stress Test With Myocardial Perfusion " One Day    Interpretation Summary    Myocardial perfusion imaging indicates a normal myocardial perfusion study with no evidence of ischemia.    Left ventricular ejection fraction is normal (Calculated EF = 61%).    TID 1.12.    Findings consistent with a normal ECG stress test.       HEART CATH:  No results found for this or any previous visit.    EKG:        TELEMETRY:          I reviewed the patient's new clinical results.    ALLERGIES: Nuvigil [armodafinil] and Provigil [modafinil]    Medication Review:   Current list of medications may not reflect those currently placed in orders that are not signed or are being held.     aspirin, 81 mg, Oral, Daily  atorvastatin, 40 mg, Oral, Nightly  budesonide-formoterol, 2 puff, Inhalation, BID - RT  cetirizine, 10 mg, Oral, Daily  cholecalciferol, 50,000 Units, Oral, Q7 Days  digoxin, 250 mcg, Intravenous, Q6H  empagliflozin, 10 mg, Oral, Daily  furosemide, 40 mg, Oral, Daily  insulin lispro, 2-9 Units, Subcutaneous, 4x Daily AC & at Bedtime  metoprolol tartrate, 25 mg, Oral, Q12H  Pharmacy Consult, , Does not apply, Once  rivaroxaban, 20 mg, Oral, Daily With Dinner  sodium chloride, 10 mL, Intravenous, Q12H           albuterol    senna-docusate sodium **AND** polyethylene glycol **AND** bisacodyl **AND** bisacodyl    Calcium Replacement - Follow Nurse / BPA Driven Protocol    dextrose    dextrose    glucagon (human recombinant)    Magnesium Standard Dose Replacement - Follow Nurse / BPA Driven Protocol    nitroglycerin    Phosphorus Replacement - Follow Nurse / BPA Driven Protocol    Potassium Replacement - Follow Nurse / BPA Driven Protocol    Potassium Replacement - Follow Nurse / BPA Driven Protocol    [COMPLETED] Insert Peripheral IV **AND** sodium chloride    sodium chloride    sodium chloride    Assessment    1.  Acute on chronic HFpEF, improving  2.  New onset atrial flutter with rapid ventricular response.  [id number] score of at least 3 for hypertension, type 2  diabetes and heart failure  3.  Pulmonary hypertension noted on recent echo Doppler study, likely secondary to undiagnosed NELY  4.  Moderate obesity with BMI 53  5.  Type II DM  6.  Nonobstructive coronary artery disease noted on recent coronary angiography                  Planning   1.  Approaching euvolemic, continue with oral diuretics  2.  Continue on beta-blocker and Jardiance.  Consider addition of MRA in the future  3.  Sotalol not started secondary to QTc.  Patient remains on Lopressor    4.  Digoxin 250 mcg every 6 hours for 4 doses only.  Hold if heart rate is less than 70.  Plan to transition to p.o. tomorrow if patient tolerates well.  5.  Continue to closely monitor heart rate on telemetry  6.  Chronically anticoagulated on Xarelto, will plan to continue  7. Troponin levels have been flat trend with patient not having anginal symptoms and recent CT coronary angiogram revealing nonobstructive disease, no indication for invasive coronary angiography at this time. continue aggressive risk factor modification.   8.  Avoid QT prolonging drugs  9.  Sleep evaluation as an outpatient  10.  Consider electro cardioversion in the future if patient has persistent atrial flutter                I have discussed this patient with Dr. Goins and together, we have formed a plan of care for this patient as stated above in the recommendations.     I have discussed the patients findings and recommendations with the patient.    Thank you very much for asking us to be involved in this patient's care.  We will follow along with you.        Electronically signed by JACEK Jackson, 07/28/24, 1:10 PM EDT.              Please note that portions of this note were completed with a voice recognition program.    Please note that portions of this note were copied and has been reviewed and is accurate as of 7/28/2024 .

## 2024-07-29 DIAGNOSIS — I48.3 TYPICAL ATRIAL FLUTTER: Primary | ICD-10-CM

## 2024-07-29 LAB
ANION GAP SERPL CALCULATED.3IONS-SCNC: 11 MMOL/L (ref 5–15)
BASOPHILS # BLD AUTO: 0.07 10*3/MM3 (ref 0–0.2)
BASOPHILS NFR BLD AUTO: 0.8 % (ref 0–1.5)
BUN SERPL-MCNC: 25 MG/DL (ref 6–20)
BUN/CREAT SERPL: 19.7 (ref 7–25)
CALCIUM SPEC-SCNC: 9.3 MG/DL (ref 8.6–10.5)
CHLORIDE SERPL-SCNC: 106 MMOL/L (ref 98–107)
CO2 SERPL-SCNC: 24 MMOL/L (ref 22–29)
CREAT SERPL-MCNC: 1.27 MG/DL (ref 0.76–1.27)
DEPRECATED RDW RBC AUTO: 51 FL (ref 37–54)
EGFRCR SERPLBLD CKD-EPI 2021: 70.1 ML/MIN/1.73
EOSINOPHIL # BLD AUTO: 0.3 10*3/MM3 (ref 0–0.4)
EOSINOPHIL NFR BLD AUTO: 3.5 % (ref 0.3–6.2)
ERYTHROCYTE [DISTWIDTH] IN BLOOD BY AUTOMATED COUNT: 14.7 % (ref 12.3–15.4)
GLUCOSE BLDC GLUCOMTR-MCNC: 104 MG/DL (ref 70–130)
GLUCOSE BLDC GLUCOMTR-MCNC: 110 MG/DL (ref 70–130)
GLUCOSE BLDC GLUCOMTR-MCNC: 116 MG/DL (ref 70–130)
GLUCOSE BLDC GLUCOMTR-MCNC: 126 MG/DL (ref 70–130)
GLUCOSE SERPL-MCNC: 125 MG/DL (ref 65–99)
HCT VFR BLD AUTO: 50.6 % (ref 37.5–51)
HGB BLD-MCNC: 15.9 G/DL (ref 13–17.7)
IMM GRANULOCYTES # BLD AUTO: 0.02 10*3/MM3 (ref 0–0.05)
IMM GRANULOCYTES NFR BLD AUTO: 0.2 % (ref 0–0.5)
LYMPHOCYTES # BLD AUTO: 1.91 10*3/MM3 (ref 0.7–3.1)
LYMPHOCYTES NFR BLD AUTO: 22.3 % (ref 19.6–45.3)
MAGNESIUM SERPL-MCNC: 2.2 MG/DL (ref 1.6–2.6)
MCH RBC QN AUTO: 29.4 PG (ref 26.6–33)
MCHC RBC AUTO-ENTMCNC: 31.4 G/DL (ref 31.5–35.7)
MCV RBC AUTO: 93.5 FL (ref 79–97)
MONOCYTES # BLD AUTO: 0.64 10*3/MM3 (ref 0.1–0.9)
MONOCYTES NFR BLD AUTO: 7.5 % (ref 5–12)
NEUTROPHILS NFR BLD AUTO: 5.64 10*3/MM3 (ref 1.7–7)
NEUTROPHILS NFR BLD AUTO: 65.7 % (ref 42.7–76)
NRBC BLD AUTO-RTO: 0 /100 WBC (ref 0–0.2)
PLATELET # BLD AUTO: 132 10*3/MM3 (ref 140–450)
PMV BLD AUTO: 10.8 FL (ref 6–12)
POTASSIUM SERPL-SCNC: 4.7 MMOL/L (ref 3.5–5.2)
RBC # BLD AUTO: 5.41 10*6/MM3 (ref 4.14–5.8)
SODIUM SERPL-SCNC: 141 MMOL/L (ref 136–145)
WBC NRBC COR # BLD AUTO: 8.58 10*3/MM3 (ref 3.4–10.8)

## 2024-07-29 PROCEDURE — 82948 REAGENT STRIP/BLOOD GLUCOSE: CPT

## 2024-07-29 PROCEDURE — 94799 UNLISTED PULMONARY SVC/PX: CPT

## 2024-07-29 PROCEDURE — 97161 PT EVAL LOW COMPLEX 20 MIN: CPT

## 2024-07-29 PROCEDURE — 97165 OT EVAL LOW COMPLEX 30 MIN: CPT

## 2024-07-29 PROCEDURE — 99233 SBSQ HOSP IP/OBS HIGH 50: CPT | Performed by: STUDENT IN AN ORGANIZED HEALTH CARE EDUCATION/TRAINING PROGRAM

## 2024-07-29 PROCEDURE — 99232 SBSQ HOSP IP/OBS MODERATE 35: CPT | Performed by: NURSE PRACTITIONER

## 2024-07-29 PROCEDURE — 83735 ASSAY OF MAGNESIUM: CPT | Performed by: INTERNAL MEDICINE

## 2024-07-29 PROCEDURE — 85025 COMPLETE CBC W/AUTO DIFF WBC: CPT | Performed by: INTERNAL MEDICINE

## 2024-07-29 PROCEDURE — 94761 N-INVAS EAR/PLS OXIMETRY MLT: CPT

## 2024-07-29 PROCEDURE — 80048 BASIC METABOLIC PNL TOTAL CA: CPT | Performed by: NURSE PRACTITIONER

## 2024-07-29 PROCEDURE — 93005 ELECTROCARDIOGRAM TRACING: CPT | Performed by: NURSE PRACTITIONER

## 2024-07-29 PROCEDURE — 94640 AIRWAY INHALATION TREATMENT: CPT

## 2024-07-29 PROCEDURE — 94664 DEMO&/EVAL PT USE INHALER: CPT

## 2024-07-29 PROCEDURE — 25010000002 DIGOXIN PER 500 MCG: Performed by: NURSE PRACTITIONER

## 2024-07-29 RX ORDER — DIGOXIN 125 MCG
125 TABLET ORAL
Status: DISCONTINUED | OUTPATIENT
Start: 2024-07-29 | End: 2024-08-01 | Stop reason: HOSPADM

## 2024-07-29 RX ADMIN — ATORVASTATIN CALCIUM 40 MG: 40 TABLET, FILM COATED ORAL at 20:33

## 2024-07-29 RX ADMIN — METOPROLOL TARTRATE 37.5 MG: 25 TABLET, FILM COATED ORAL at 20:32

## 2024-07-29 RX ADMIN — BUDESONIDE AND FORMOTEROL FUMARATE DIHYDRATE 2 PUFF: 160; 4.5 AEROSOL RESPIRATORY (INHALATION) at 18:35

## 2024-07-29 RX ADMIN — FUROSEMIDE 40 MG: 40 TABLET ORAL at 08:47

## 2024-07-29 RX ADMIN — Medication 10 ML: at 08:47

## 2024-07-29 RX ADMIN — METOPROLOL TARTRATE 25 MG: 25 TABLET, FILM COATED ORAL at 08:48

## 2024-07-29 RX ADMIN — EMPAGLIFLOZIN 10 MG: 10 TABLET, FILM COATED ORAL at 08:48

## 2024-07-29 RX ADMIN — DIGOXIN 125 MCG: 125 TABLET ORAL at 12:19

## 2024-07-29 RX ADMIN — ASPIRIN 81 MG: 81 TABLET, COATED ORAL at 08:48

## 2024-07-29 RX ADMIN — DIGOXIN 250 MCG: 0.25 INJECTION INTRAMUSCULAR; INTRAVENOUS at 05:12

## 2024-07-29 RX ADMIN — CETIRIZINE HYDROCHLORIDE 10 MG: 10 TABLET, FILM COATED ORAL at 08:47

## 2024-07-29 RX ADMIN — Medication 10 ML: at 20:33

## 2024-07-29 RX ADMIN — RIVAROXABAN 20 MG: 20 TABLET, FILM COATED ORAL at 17:37

## 2024-07-29 NOTE — PROGRESS NOTES
[facility]  PROGRESS NOTE    Patient information:  Name: Con Colón  Age/Sex: 49 y.o. male  :  1975        PCP: Halie Dubois APRN  Attending: Davey Rubio MD  MRN:  [id number]  Visit Number:  [id number]    LOS: 4  CODE STATUS:    Code Status and Medical Interventions: CPR (Attempt to Resuscitate); Full Support   Ordered at: 24 1425     Level Of Support Discussed With:    Patient     Code Status (Patient has no pulse and is not breathing):    CPR (Attempt to Resuscitate)     Medical Interventions (Patient has pulse or is breathing):    Full Support       PROBLEM LIST:Principal Problem:    Acute respiratory failure with hypoxia      Reason for Cardiology follow-up: DAVIS and Consideration for left heart cath     Subjective   ADMISSION INFORMATION:  Chief Complaint   Patient presents with    Syncope    Shortness of Breath       DATE:2024    HPI:  Con Colón is a 49 y.o. male with a past medical history significant for nonobstructive coronary artery disease with recent CT coronary angiogram showing multivessel nonobstructive CAD with worst area being 50 to 70% stenosis in the left circumflex artery with total calcium score of 1200, HFpEF w EF of 66-70% (2024), NELY, Cardiomegaly, DMT2, HLD, HTN, COPD, history of PE anticoagulated with Xarelto and morbid obesity with a BMI of 53.     Patient presented to [facility] ([facility] emergency room (ER) on 2024 with complaints of Dyspnea and unable to walk 10 yards.      Primary Cardiologist has been JORI Moran and he was last seen in the office on 2024.     Interval History:   Telemetry reveals atrial flutter 60-110s. According to patient's I & O flow chart he had x 9 unmeasured urine occurrences overnight. AM labs reveal potassium 4.7, creatinine 1.27, hemoglobin 15.9, and platelet count 132.  Magnesium level 2.2.    Patient was in room 302 A when he was seen and  examined.  Patient is sitting up on the edge of his bed resting quietly.  No acute distress noted at this time.  Patient reports his breathing has improved and he denies any chest pain or palpitations.    ORDERS:   Start Digoxin 125 mcg PO daily today with HOLD parameters adjusted for heart rate less than 60 BPM.   Digoxin level on Tuesday 7/30/2024  Increased Lopressor to 37.5 mg PO BID with HOLD parameters in place for SBP less than 100, DBP less than 60, and heart rate less than 50. .   NPO after midnight tonight, Monday 7/29/2024  Planning for electrocardioversion on Tuesday 7/30/2024    Objective     Vital Signs  Temp:  [96.6 °F (35.9 °C)-98.5 °F (36.9 °C)] 98.3 °F (36.8 °C)  Heart Rate:  [] 64  Resp:  [20] 20  BP: ()/(58-83) 109/69  Device (Oxygen Therapy): room air  Vital Signs (last 72 hrs)         07/26 0700  07/27 0659 07/27 0700 07/28 0659 07/28 0700 07/29 0659 07/29 0700 07/29 0801   Most Recent      Temp (°F) 97.7 -  98    97.6 -  98.6    96.6 -  98.7       98.1 (36.7) 07/29 0618    Heart Rate 106 -  117    89 -  119    61 -  119       61 07/29 0618    Resp 18 -  20      20      20       20 07/29 0618    /76 -  124/92    101/71 -  142/93    93/65 -  116/76       96/60 07/29 0618    SpO2 (%) 92 -  96    90 -  98    90 -  96       92 07/29 0618    Flow (L/min)   2.5           2.5 07/27 0651          BMI:Body mass index is 52.49 kg/m².    WEIGHT:      07/27/24  0500 07/28/24  0500 07/29/24  0500   Weight: (!) 171 kg (376 lb 1.7 oz) (!) 170 kg (375 lb 9.6 oz) (!) 171 kg (376 lb 3.2 oz)       DIET:Diet: Cardiac, Diabetic; Healthy Heart (2-3 Na+); Consistent Carbohydrate; Fluid Consistency: Thin (IDDSI 0)  NPO Diet NPO Type: Strict NPO    I&O:  Intake & Output (last 3 days)         07/26 0701 07/27 0700 07/27 0701 07/28 0700 07/28 0701 07/29 0700 07/29 0701 07/30 0700    P.O. 1200 480 600     Total Intake(mL/kg) 1200 (7) 480 (2.8) 600 (3.5)     Net +1200 +480 +600             Urine  Unmeasured Occurrence 5 x 1 x 9 x     Stool Unmeasured Occurrence   1 x              PHYSICAL EXAM:  Vitals and nursing note reviewed.   Constitutional:       Appearance: Healthy appearance. Not in distress.   HENT:      Nose: Nose normal.    Mouth/Throat:      Dentition: Normal.      Pharynx: Oropharynx is clear.   Neck:      Vascular: No JVR. JVD normal.   Pulmonary:      Effort: Pulmonary effort is normal.      Breath sounds: Normal breath sounds. No wheezing. No rhonchi. No rales.   Chest:      Chest wall: Not tender to palpatation.   Cardiovascular:      PMI at left midclavicular line. Normal rate. Irregular rhythm. Normal S1. Normal S2.       Murmurs: There is no murmur.      No gallop.  No click. No rub.   Pulses:     Intact distal pulses.   Edema:     Peripheral edema present.  Abdominal:      General: Bowel sounds are normal. There is no distension.      Palpations: Abdomen is soft.      Tenderness: There is no abdominal tenderness.   Musculoskeletal: Normal range of motion.         General: No tenderness.      Cervical back: Normal range of motion and neck supple. Skin:     General: Skin is warm and dry.      Comments: Stasis dermatitis changes noted in BLE   Neurological:      General: No focal deficit present.      Mental Status: Alert and oriented to person, place and time.                  Results review   Results Review:    I have reviewed the patient's new clinical results. 07/29/24 11:49 EDT    Results from last 7 days   Lab Units 07/25/24  1730 07/25/24  1247 07/25/24  1038   HSTROP T ng/L 28* 33* 33*     Lab Results   Component Value Date    PROBNP 2,468.0 (H) 07/25/2024     Results from last 7 days   Lab Units 07/29/24  0127 07/28/24  0122 07/26/24  0538 07/25/24  1038   WBC 10*3/mm3 8.58 8.11 7.20 7.55   HEMOGLOBIN g/dL 15.9 15.8 15.4 16.0   PLATELETS 10*3/mm3 132* 148 142 151     Results from last 7 days   Lab Units 07/29/24  0127 07/28/24  0122 07/27/24  1434 07/27/24  0037 07/26/24  1607  07/26/24  0538 07/25/24  1038   SODIUM mmol/L 141 141 139 139  --  136 144   POTASSIUM mmol/L 4.7 4.2 4.5 4.1 3.8 3.6 4.2   CHLORIDE mmol/L 106 105 104 103  --  104 108*   CO2 mmol/L 24.0 23.6 25.2 26.1  --  20.6* 23.4   BUN mg/dL 25* 19 18 18  --  21* 22*   CREATININE mg/dL 1.27 1.08 1.10 0.98  --  1.09 1.19   CALCIUM mg/dL 9.3 9.3 9.4 9.2  --  9.1 9.2   GLUCOSE mg/dL 125* 143* 113* 99  --  125* 98   ALT (SGPT) U/L  --   --   --   --   --   --  14   AST (SGOT) U/L  --   --   --   --   --   --  18     Lab Results   Component Value Date    MG 2.2 07/29/2024    MG 2.1 07/28/2024    MG 2.1 07/27/2024     Estimated Creatinine Clearance: 115.9 mL/min (by C-G formula based on SCr of 1.27 mg/dL).    Lab Results   Component Value Date    HGBA1C 6.50 (H) 07/15/2024    HGBA1C 7.19 (H) 01/26/2022    HGBA1C 6.40 (H) 07/26/2021     Lab Results   Component Value Date    CHOL 120 01/26/2022    TRIG 49 01/26/2022    LDL 77 01/26/2022    HDL 32 (L) 01/26/2022     Lab Results   Component Value Date    ABSOLUTELUNG 35 07/26/2024     Lab Results   Component Value Date    INR 2.31 (H) 07/25/2024    INR 1.33 (H) 07/13/2024    INR 1.02 (L) 11/16/2020    INR 1.37 (L) 01/16/2020    INR 1.21 (L) 12/13/2019    INR 1.21 (L) 12/12/2019    INR 1.21 (L) 12/10/2019     Lab Results   Component Value Date    LABHEPA 0.65 07/26/2024    LABHEPA 0.96 (H) 07/26/2024    LABHEPA >1.10 (C) 07/25/2024    LABHEPA >1.10 (C) 07/25/2024       Lab Results   Component Value Date    TSH 1.630 01/26/2022      Pain Management Panel          Latest Ref Rng & Units 7/25/2024   Pain Management Panel   Amphetamine, Urine Qual Negative Negative    Barbiturates Screen, Urine Negative Negative    Benzodiazepine Screen, Urine Negative Negative    Buprenorphine, Screen, Urine Negative Negative    Cocaine Screen, Urine Negative Negative    Fentanyl, Urine Negative Negative    Methadone Screen , Urine Negative Negative    Methamphetamine, Ur Negative Negative       Details                  Microbiology Results (last 10 days)       ** No results found for the last 240 hours. **           Imaging Results (Last 24 Hours)       ** No results found for the last 24 hours. **          ECHO:  Results for orders placed during the hospital encounter of 07/25/24    Adult Transthoracic Echo Limited W/ Cont if Necessary Per Protocol    Interpretation Summary    The right ventricular cavity is dilated.    The right atrial cavity is dilated.    Very limited study  poor acoustic window  Cannot comment about wall motion abnormality  Patient is tachycardic  Right ventricle is very dilated  Echo window is not adequate to comment about ejection fraction.  Endocardium thickening cannot be evaluated,  Will recommend repeat echo with contrast or try to obtain limited echo with multiple color that can expose the endocardium and ejection fraction can be evaluated      STRESS TEST:  Results for orders placed during the hospital encounter of 07/13/24    Stress Test With Myocardial Perfusion One Day    Interpretation Summary    Myocardial perfusion imaging indicates a normal myocardial perfusion study with no evidence of ischemia.    Left ventricular ejection fraction is normal (Calculated EF = 61%).    TID 1.12.    Findings consistent with a normal ECG stress test.       HEART CATH:  No results found for this or any previous visit.        CT Angiogram Heart With 3D Image:  7/18/2024  Coronary Calcium Score (Agatson):  LM:    0  RCA: 413  PDA: 9  LAD: 558  LCX: 170  Other: 51     TOTAL: 120    IMPRESSION:  1. Calcific plaque in the mid circumflex producing a 50-69% stenosis  (CAD RADS 3).  2. Calcific plaque in the proximal LAD and mid RCA producing a 25-49%  stenosis (CAD RADS 2).  3. Calcific plaque in the proximal RCA producing a 1-24% stenosis (CAD  RADS 1).  4. Coronary artery calcium score of 1201 which is in the   percentile for a patient of this age.  5. Mild wall thickening involving the left  ventricle.  6. Moderate to severe dilatation of the right atrium and ventricle..     This report was finalized on 2024 3:40 PM by Laura Silver M.D..      [id number]      TELEMETRY:     Atrial Flutter 60-110s                 I reviewed the patient's new clinical results.    ALLERGIES: Nuvigil [armodafinil] and Provigil [modafinil]    Medication Review:   Current list of medications may not reflect those currently placed in orders that are not signed or are being held.     aspirin, 81 mg, Oral, Daily  atorvastatin, 40 mg, Oral, Nightly  budesonide-formoterol, 2 puff, Inhalation, BID - RT  cetirizine, 10 mg, Oral, Daily  cholecalciferol, 50,000 Units, Oral, Q7 Days  digoxin, 125 mcg, Oral, Daily  empagliflozin, 10 mg, Oral, Daily  furosemide, 40 mg, Oral, Daily  insulin lispro, 2-9 Units, Subcutaneous, 4x Daily AC & at Bedtime  metoprolol tartrate, 37.5 mg, Oral, Q12H  Pharmacy Consult, , Does not apply, Once  rivaroxaban, 20 mg, Oral, Daily With Dinner  sodium chloride, 10 mL, Intravenous, Q12H           albuterol    senna-docusate sodium **AND** polyethylene glycol **AND** bisacodyl **AND** bisacodyl    Calcium Replacement - Follow Nurse / BPA Driven Protocol    dextrose    dextrose    glucagon (human recombinant)    Magnesium Standard Dose Replacement - Follow Nurse / BPA Driven Protocol    nitroglycerin    Phosphorus Replacement - Follow Nurse / BPA Driven Protocol    Potassium Replacement - Follow Nurse / BPA Driven Protocol    Potassium Replacement - Follow Nurse / BPA Driven Protocol    [COMPLETED] Insert Peripheral IV **AND** sodium chloride    sodium chloride    sodium chloride    Assessment    Acute on chronic HFpEF, improving  2.  New onset atrial flutter with rapid ventricular response. [id number] score of at least 3 for hypertension, type 2 diabetes and heart failure  3.  Pulmonary hypertension noted on recent echo Doppler study, likely secondary to under treated NELY.  4.  Nonobstructive  coronary artery disease noted on recent coronary angiography.  5. DMT2  6. History of PE  7. Moderate obesity with BMI 53    Recommendations / Plan   Appears euvolemic, continue with oral diuretics, Lopressor, and Jardiance for now. Unable to advance GDMT with ACEi, ARB, ARNI or MAR at this time due to low BP. Monitor and maintain K+ 4.0-4.5 and Magnesium level 2.0-2.2. Supplement as needed per Hospital Cardiology Protocol. Patient will need an appointment to follow up with [facility] post-DC.   Sotalol was not started secondary to prolonged Qtc intervals. Currently on Lopressor and plan to continue as tolerated with low BP and HR in 60s. Plan to increase Lopressor to 37.5 mg PO BID with HOLD parameters for SBP less than 100, DBP less than 60 and heart rate less than 50 BPM. Will start Digoxin 125 mcg PO daily today with HOLD parameter for heart rate is less than 60.  Digoxin level on Tuesday 7/30/2024. Chronically anticoagulated on Xarelto and plan to continue. Avoid QT prolonging drugs. Continue to closely monitor BP, heart rate, and telemetry.  Will plan for electro cardioversion on Tuesday 7/30/2024 if patient has persistent atrial flutter as he has ate breakfast today. I have spoke with ZABRINA Blancas from [facility] Dr. Street plans to make arrangements for an outpatient appointment in his office for evaluation to consider an ablation post DC.   Patient will need updated sleep study evaluation in outpatient setting post DC.   Troponin levels have been flat trend with patient not having anginal symptoms and recent CT coronary angiogram revealing nonobstructive disease. Will continue aggressive risk factor modification and continue low dose ASA, Lipitor, and Lopressor.   DMT2 managed per Primary Care Team.   Continue with chronic anticoagulation with Xarelto.   Encourage risk factor modification with diet and exercise as health status allows.       I have discussed this patient's  case with Dr. Espitia and together formed a plan of care as stated above in the recommendations/plan.      I have discussed the patients findings and recommendations with the patient.    Thank you very much for asking us to be involved in this patient's care.  We will follow along with you.    Electronically signed by JACEK Seo, 07/29/24, 11:49 AM EDT.                     Please note that portions of this note were completed with a voice recognition program.    Please note that portions of this note were copied and has been reviewed and is accurate as of 7/29/2024 .       .Electronically signed by Dwayne Espitia MD, 07/29/24, 5:53 PM EDT.

## 2024-07-29 NOTE — THERAPY EVALUATION
Patient Name: Con Colón  : 1975    MRN: [id number]                              Today's Date: 2024       Admit Date: 2024    Visit Dx:     ICD-10-CM ICD-9-CM   1. Dyspnea on exertion  R06.09 786.09   2. Hypoxia  R09.02 799.02   3. Chronic heart failure with preserved ejection fraction (HFpEF)  I50.32 428.9     Patient Active Problem List   Diagnosis    Type 2 diabetes mellitus with hyperglycemia    Essential hypertension    Hyperlipidemia    Venous insufficiency    Anticoagulant long-term use    History of pulmonary embolus (PE)    Morbid obesity with BMI of 50.0-59.9, adult    History of DVT (deep vein thrombosis)    Chronic edema    NSTEMI (non-ST elevated myocardial infarction)    Chest pain    Heart failure, unspecified    Type 2 myocardial infarction    Chronic heart failure with preserved ejection fraction (HFpEF)    Acute respiratory failure with hypoxia     Past Medical History:   Diagnosis Date    Cardiomegaly     COPD (chronic obstructive pulmonary disease)     Hypertension     Pulmonary embolism     PAST    Sleep apnea      History reviewed. No pertinent surgical history.   General Information       Row Name 24 1507          OT Time and Intention    Document Type evaluation  -     Mode of Treatment individual therapy;occupational therapy  -       Row Name 24 1507          General Information    Patient Profile Reviewed yes  -     Prior Level of Function independent:;all household mobility;community mobility;ADL's  -     Existing Precautions/Restrictions fall  -     Barriers to Rehab none identified  -       Row Name 24 1507          Occupational Profile    Reason for Services/Referral (Occupational Profile) Patient was admitted to [facility] on 2024. He was referred for OT evaluation due to change in functional performance with ADLs, functional mobility, and/or transfers.  -       Row Name 24 1507          Living Environment     People in Home alone  -       Row Name 07/29/24 1507          Cognition    Orientation Status (Cognition) oriented x 4  -       Row Name 07/29/24 1507          Safety Issues, Functional Mobility    Impairments Affecting Function (Mobility) endurance/activity tolerance  -               User Key  (r) = Recorded By, (t) = Taken By, (c) = Cosigned By      Initials Name Provider Type     Rosario Tsai OT Occupational Therapist                     Mobility/ADL's       Row Name 07/29/24 1509          Bed Mobility    Bed Mobility bed mobility (all) activities  -     All Activities, Banks (Bed Mobility) supervision  -[facility] Name 07/29/24 1509          Transfers    Transfers sit-stand transfer;stand-sit transfer  -[facility] Name 07/29/24 1509          Sit-Stand Transfer    Sit-Stand Banks (Transfers) supervision  -[facility] Name 07/29/24 1509          Stand-Sit Transfer    Stand-Sit Banks (Transfers) supervision  -[facility] Name 07/29/24 1509          Activities of Daily Living    BADL Assessment/Intervention bathing;upper body dressing;lower body dressing;grooming;toileting  -[facility] Name 07/29/24 1509          Bathing Assessment/Intervention    Banks Level (Bathing) bathing skills;supervision  -[facility] Name 07/29/24 1509          Upper Body Dressing Assessment/Training    Banks Level (Upper Body Dressing) upper body dressing skills;supervision  -[facility] Name 07/29/24 1509          Lower Body Dressing Assessment/Training    Banks Level (Lower Body Dressing) lower body dressing skills;supervision  -[facility] Name 07/29/24 1509          Grooming Assessment/Training    Banks Level (Grooming) grooming skills;supervision  -[facility] Name 07/29/24 1509          Toileting Assessment/Training    Banks Level (Toileting) toileting skills;supervision  -               User Key  (r) = Recorded By, (t) = Taken By, (c) = Cosigned By       Initials Name Provider Type     Rosario Tsai OT Occupational Therapist                   Obj/Interventions       Row Name 07/29/24 1509          Sensory Assessment (Somatosensory)    Sensory Assessment (Somatosensory) UE sensation intact  -KP       Row Name 07/29/24 1509          Vision Assessment/Intervention    Visual Impairment/Limitations WFL;corrective lenses full-time  -KP       Row Name 07/29/24 1509          Range of Motion Comprehensive    General Range of Motion bilateral upper extremity ROM WFL  -KP       Row Name 07/29/24 1509          Strength Comprehensive (MMT)    General Manual Muscle Testing (MMT) Assessment no strength deficits identified  -KP       Row Name 07/29/24 1509          Motor Skills    Motor Skills coordination;functional endurance  -     Coordination bilateral;upper extremity  -     Functional Endurance fair plus  -KP       Row Name 07/29/24 1509          Balance    Balance Assessment sitting static balance;standing static balance  -     Static Sitting Balance independent  -     Static Standing Balance supervision  -               User Key  (r) = Recorded By, (t) = Taken By, (c) = Cosigned By      Initials Name Provider Type     Rosario Tsai OT Occupational Therapist                   Goals/Plan    No documentation.                  Clinical Impression       Row Name 07/29/24 1521          Pain Assessment    Pretreatment Pain Rating 0/10 - no pain  -     Posttreatment Pain Rating 0/10 - no pain  -KP       Row Name 07/29/24 1521          Plan of Care Review    Plan of Care Reviewed With patient  -     Progress no change  -     Outcome Evaluation Patient seen for OT evaluation. He presents at supervision level or better with ADLs and functional mobility/transfers. No further OT services required, patient discharged from OT caseload.  -KP       Row Name 07/29/24 1521          Therapy Assessment/Plan (OT)    Criteria for Skilled Therapeutic Interventions Met  (OT) no;no problems identified which require skilled intervention  -     Therapy Frequency (OT) evaluation only  -       Row Name 07/29/24 1521          Therapy Plan Review/Discharge Plan (OT)    Anticipated Discharge Disposition (OT) home  -       Row Name 07/29/24 1521          Positioning and Restraints    Pre-Treatment Position in bed  -KP     Post Treatment Position bed  -KP     In Bed sitting EOB;call light within reach;encouraged to call for assist  -KP               User Key  (r) = Recorded By, (t) = Taken By, (c) = Cosigned By      Initials Name Provider Type    Rosario Licona, DIXIE Occupational Therapist                   Outcome Measures       Row Name 07/29/24 0755          How much help from another person do you currently need...    Turning from your back to your side while in flat bed without using bedrails? 4  -LB     Moving from lying on back to sitting on the side of a flat bed without bedrails? 4  -LB     Moving to and from a bed to a chair (including a wheelchair)? 4  -LB     Standing up from a chair using your arms (e.g., wheelchair, bedside chair)? 4  -LB     Climbing 3-5 steps with a railing? 4  -LB     To walk in hospital room? 4  -LB     AM-PAC 6 Clicks Score (PT) 24  -LB     Highest Level of Mobility Goal 8 --> Walked 250 feet or more  -LB               User Key  (r) = Recorded By, (t) = Taken By, (c) = Cosigned By      Initials Name Provider Type    Tala Lopez, RN Registered Nurse                      OT Recommendation and Plan  Therapy Frequency (OT): evaluation only  Plan of Care Review  Plan of Care Reviewed With: patient  Progress: no change  Outcome Evaluation: Patient seen for OT evaluation. He presents at supervision level or better with ADLs and functional mobility/transfers. No further OT services required, patient discharged from OT caseload.     Time Calculation:         Time Calculation- OT       Row Name 07/29/24 1522             Time Calculation- OT    OT  Received On 07/29/24  -                User Key  (r) = Recorded By, (t) = Taken By, (c) = Cosigned By      Initials Name Provider Type    Rosario Licona OT Occupational Therapist                  Therapy Charges for Today       Code Description Service Date Service Provider Modifiers Qty    [id number] HC OT EVAL LOW COMPLEXITY 4 7/29/2024 Rosario Tsai OT GO 1                 Rosario Tsai OT  7/29/2024

## 2024-07-29 NOTE — PLAN OF CARE
Problem: Adult Inpatient Plan of Care  Goal: Absence of Hospital-Acquired Illness or Injury  Intervention: Prevent Skin Injury  Recent Flowsheet Documentation  Taken 7/28/2024 1903 by Omar Adams RN  Body Position: position changed independently     Problem: Adult Inpatient Plan of Care  Goal: Absence of Hospital-Acquired Illness or Injury  Intervention: Prevent and Manage VTE (Venous Thromboembolism) Risk  Recent Flowsheet Documentation  Taken 7/28/2024 1903 by Omar Adams RN  Activity Management: up ad dante  VTE Prevention/Management: (See MAR) other (see comments)  Range of Motion: active ROM (range of motion) encouraged   Goal Outcome Evaluation:

## 2024-07-29 NOTE — THERAPY EVALUATION
Acute Care - Physical Therapy Initial Evaluation   [facility]     Patient Name: Con Colón  : 1975  MRN: [id number]  Today's Date: 2024      Visit Dx:     ICD-10-CM ICD-9-CM   1. Dyspnea on exertion  R06.09 786.09   2. Hypoxia  R09.02 799.02   3. Chronic heart failure with preserved ejection fraction (HFpEF)  I50.32 428.9     Patient Active Problem List   Diagnosis    Type 2 diabetes mellitus with hyperglycemia    Essential hypertension    Hyperlipidemia    Venous insufficiency    Anticoagulant long-term use    History of pulmonary embolus (PE)    Morbid obesity with BMI of 50.0-59.9, adult    History of DVT (deep vein thrombosis)    Chronic edema    NSTEMI (non-ST elevated myocardial infarction)    Chest pain    Heart failure, unspecified    Type 2 myocardial infarction    Chronic heart failure with preserved ejection fraction (HFpEF)    Acute respiratory failure with hypoxia     Past Medical History:   Diagnosis Date    Cardiomegaly     COPD (chronic obstructive pulmonary disease)     Hypertension     Pulmonary embolism     PAST    Sleep apnea      History reviewed. No pertinent surgical history.  PT Assessment (Last 12 Hours)       PT Evaluation and Treatment       Row Name 24 1301          Physical Therapy Time and Intention    Subjective Information complains of;weakness;swelling (P)   -KT     Document Type evaluation (P)   -KT     Mode of Treatment physical therapy (P)   -KT     Patient Effort good (P)   -KT     Symptoms Noted During/After Treatment fatigue (P)   -KT       Row Name 24 1301          General Information    Patient Profile Reviewed yes (P)   -KT     Patient Observations alert;cooperative;agree to therapy (P)   -KT     Prior Level of Function independent:;all household mobility;ADL's (P)   -KT     Equipment Currently Used at Home none (P)   -KT     Existing Precautions/Restrictions fall (P)   -KT     Risks Reviewed patient:;LOB;nausea/vomiting;dizziness;increased  discomfort (P)   -KT     Benefits Reviewed patient:;improve function;increase independence;increase strength;increase balance (P)   -KT     Barriers to Rehab none identified (P)   -KT       Row Name 07/29/24 1301          Living Environment    Current Living Arrangements home (P)   -KT     People in Home alone (P)   -KT     Primary Care Provided by self (P)   -KT       Row Name 07/29/24 1301          Cognition    Affect/Mental Status (Cognition) WFL (P)   -KT     Orientation Status (Cognition) oriented x 4 (P)   -KT     Follows Commands (Cognition) WFL (P)   -KT     Cognitive Function WFL (P)   -KT       Row Name 07/29/24 1301          Range of Motion (ROM)    Range of Motion ROM is WFL;bilateral lower extremities (P)   -KT       [facility] Name 07/29/24 1301          Strength (Manual Muscle Testing)    Strength (Manual Muscle Testing) strength is WFL;bilateral lower extremities (P)   -KT       Row Name 07/29/24 1301          Bed Mobility    Bed Mobility bed mobility (all) activities (P)   -KT     All Activities, [place] (Bed Mobility) supervision (P)   -KT     Assistive Device (Bed Mobility) other (see comments) (P)   none  -KT       Row Name 07/29/24 1301          Transfers    Transfers sit-stand transfer;stand-sit transfer (P)   -KT       Row Name 07/29/24 1301          Sit-Stand Transfer    Sit-Stand [place] (Transfers) supervision (P)   -KT       Row Name 07/29/24 1301          Stand-Sit Transfer    Stand-Sit [place] (Transfers) supervision (P)   -KT       [facility] Name 07/29/24 1301          Gait/Stairs (Locomotion)    Gait/Stairs Locomotion gait/ambulation independence (P)   -KT     [place] Level (Gait) supervision (P)   -KT     Patient was able to Ambulate yes (P)   -KT     Distance in Feet (Gait) 200 (P)   -KT     Pattern (Gait) swing-through (P)   -KT     Deviations/Abnormal Patterns (Gait) base of support, wide (P)   -KT       Row Name 07/29/24 1301          Safety Issues, Functional Mobility     Impairments Affecting Function (Mobility) endurance/activity tolerance (P)   -KT       Row Name 07/29/24 1301          Balance    Balance Assessment sitting static balance;standing static balance (P)   -KT     Static Sitting Balance supervision (P)   -KT     Position, Sitting Balance sitting edge of bed (P)   -KT     Static Standing Balance supervision (P)   -KT       Row Name 07/29/24 1301          Plan of Care Review    Plan of Care Reviewed With patient (P)   -KT     Outcome Evaluation Pt. evaluation was completed during PT session. He was able to demonstrate bed mobility, transfers, and ambulation w/ supervision. Pt. was able to ambulate a moderate distance. Pt. does not meet criteria for skilled therapy at this time. (P)   -KT       Row Name 07/29/24 1301          Therapy Assessment/Plan (PT)    Patient/Family Therapy Goals Statement (PT) return home (P)   -KT     Functional Level at Time of Evaluation (PT) supervision (P)   -KT     PT Diagnosis (PT) decrease endurance secondary to respiratory failure (P)   -KT     Rehab Potential (PT) good, to achieve stated therapy goals (P)   -KT     Criteria for Skilled Interventions Met (PT) no;does not meet criteria for skilled intervention (P)   -KT     Therapy Frequency (PT) evaluation only (P)   -KT       Row Name 07/29/24 1301          Therapy Plan Review/Discharge Plan (PT)    Therapy Plan Review (PT) evaluation/treatment results reviewed;patient (P)   -KT               User Key  (r) = Recorded By, (t) = Taken By, (c) = Cosigned By      Initials Name Provider Type    Petr Kitchen, PT Student PT Student                    Physical Therapy Education       Title: PT OT SLP Therapies (Done)       Topic: Physical Therapy (Done)       Point: Mobility training (Done)       Learning Progress Summary             Patient Acceptance, E,TB, VU by KT at 7/29/2024 1310                         Point: Home exercise program (Done)       Learning Progress Summary              Patient Acceptance, E,TB, VU by KT at 7/29/2024 1310                         Point: Body mechanics (Done)       Learning Progress Summary             Patient Acceptance, E,TB, VU by KT at 7/29/2024 1310                         Point: Precautions (Done)       Learning Progress Summary             Patient Acceptance, E,TB, VU by KT at 7/29/2024 1310                                         User Key       Initials Effective Dates Name Provider Type Discipline    KT 06/21/24 -  Petr Resendiz PT Student PT Student PT                  PT Recommendation and Plan  Anticipated Discharge Disposition (PT): (P) home  Therapy Frequency (PT): (P) evaluation only  Plan of Care Reviewed With: (P) patient  Outcome Evaluation: (P) Pt. evaluation was completed during PT session. He was able to demonstrate bed mobility, transfers, and ambulation w/ supervision. Pt. was able to ambulate a moderate distance. Pt. does not meet criteria for skilled therapy at this time.       Time Calculation:    PT Charges       Row Name 07/29/24 1300             Time Calculation    PT Received On 07/29/24 (P)   -KT                User Key  (r) = Recorded By, (t) = Taken By, (c) = Cosigned By      Initials Name Provider Type    KT Petr Resendiz, PT Student PT Student                  Therapy Charges for Today       Code Description Service Date Service Provider Modifiers Qty    46739056851 HC PT EVAL LOW COMPLEXITY 4 7/29/2024 Petr Resendiz, PT Student GP 1            PT G-Codes  AM-PAC 6 Clicks Score (PT): 24    SEUN Calvert  7/29/2024

## 2024-07-29 NOTE — PROGRESS NOTES
[facility] HOSPITALIST PROGRESS NOTE     Patient Identification:  Name:  Con Colón  Age:  49 y.o.  Sex:  male  :  1975  MRN:  [id number]  Visit Number:  [id number]  ROOM: [address] [place] [address]     Primary Care Provider:  Halie Dubois APRN     Date of Admission: 2024    Length of stay in inpatient status:  4    Subjective     Chief Compliant:    Chief Complaint   Patient presents with    Syncope    Shortness of Breath       Patient dangling at bedside this am, discussed Aflutter on ecg this am and patient denies any sx currently. Had some orthostasis POA that has resolved since admission. Discussed how Afib may contribute to lowered BP this am during rounds but believes most of his sx due to medication side effect        Objective       Vital Signs:  Temp:  [96.6 °F (35.9 °C)-98.5 °F (36.9 °C)] 98.3 °F (36.8 °C)  Heart Rate:  [] 88  Resp:  [20] 20  BP: ()/(58-76) 109/69  SpO2:  [90 %-95 %] 95 %  on   ;   Device (Oxygen Therapy): room air  Body mass index is 52.49 kg/m².      ----------------------------------------------------------------------------------------------------------------------  Physical Exam  Vitals and nursing note reviewed.   Constitutional:       General: He is not in acute distress.  HENT:      Head: Normocephalic and atraumatic.   Eyes:      General: No scleral icterus.     Extraocular Movements: Extraocular movements intact.   Cardiovascular:      Rate and Rhythm: Normal rate. Rhythm irregular.      Pulses: Normal pulses.   Pulmonary:      Effort: Pulmonary effort is normal. No respiratory distress.   Abdominal:      General: There is no distension.      Palpations: Abdomen is soft.   Musculoskeletal:      Right lower leg: Edema present.      Left lower leg: Edema present.   Skin:     Comments: B/l LE ace wraps   Neurological:      General: No focal deficit present.      Mental Status: He is alert.      Cranial Nerves: No cranial nerve deficit.    Psychiatric:         Mood and Affect: Mood normal.         Behavior: Behavior normal.       ----------------------------------------------------------------------------------------------------------------------          Assessment & Plan      #New onset atrial flutter w/ RVR  - Already on xarelto. At home for hx of VTE  - Cardiology planned sotolol but not started due to prolonged QTC  - Periods of A-flutter w/rvr continue but becoming less frequent with ongoing digoxin load, changed to PO today. NPO at midnight, cardioversion in am if still in Atrial flutter. Case discussed with cardiology during am rounds  - Metoprolol dose increased     #Hypoxia on presentation   #Exertional dyspnea  #HFpEF  #NELY  #Suspected obesity hypoventilation   - CTA chest on admission without evidence of PE  - ProBNP elevated and mild overload on plain film of chest, improved with diuresis, continue oral maintenance as per cardiology.   - Continue GDMT with BB, jardiance   - Continue nightly CPAP, sufficient spO2 on RA     #Recent NSTEMI, normal stress test, abnormal CTA  #Chronic troponin elevation   - Troponin 33=>33. Denies chest pain. No significant change on EKG.   - s/p stress test 7/15 w/no active ischemia noted     #DM II  - SSI and titrate as needed. Well controlled.      #HTN  #HLD  - Restarted home regimen      #Hx of PE  -On xarelto    Above problems new to me as of today    Code Status and Medical Interventions: CPR (Attempt to Resuscitate); Full Support   Ordered at: 07/25/24 1425     Level Of Support Discussed With:    Patient     Code Status (Patient has no pulse and is not breathing):    CPR (Attempt to Resuscitate)     Medical Interventions (Patient has pulse or is breathing):    Full Support         Disposition: pending DCCV in am, discharge home afterwards    I have reviewed any copied/forwarded text or data, verified its accuracy, and updated as necessary above.    Tyler Abbasi MD  [facility]  Hospitalist  07/29/24  15:08 EDT

## 2024-07-29 NOTE — CASE MANAGEMENT/SOCIAL WORK
Discharge Planning Assessment  [facility]     Patient Name: Con Colón  MRN: [id number]  Today's Date: 7/29/2024    Admit Date: 7/25/2024    Plan: CM spoke with Pt at bedside. CM  followed up on this date. Pt lives alone. PCP is Halie Dubois. Pt does not utilize home health services at this time. Pt have Cpap via [employer]. Pt does not have a POA or Living will at this time. Pt plans to return home at discharge with friend to provide transportation. CM will follow.   Discharge Needs Assessment    No documentation.                  Discharge Plan       Row Name 07/29/24 1613       Plan    Plan CM spoke with Pt at bedside. CM  followed up on this date. Pt lives alone. PCP is Halie Dubois. Pt does not utilize home health services at this time. Pt have Cpap via [employer]. Pt does not have a POA or Living will at this time. Pt plans to return home at discharge with friend to provide transportation. CM will follow.                    Brittany George RN

## 2024-07-29 NOTE — PLAN OF CARE
Goal Outcome Evaluation:    Pt is resting on the edge of the bed with no s/s of distress noted. VSS. IV access maintained. NPO at midnight for procedure in the AM. Bi-lateral wraps placed on lower extremities per order. Will continue with plan of care.

## 2024-07-30 ENCOUNTER — APPOINTMENT (OUTPATIENT)
Dept: CARDIOLOGY | Facility: HOSPITAL | Age: 49
End: 2024-07-30
Payer: MEDICAID

## 2024-07-30 LAB
DIGOXIN SERPL-MCNC: 0.9 NG/ML (ref 0.6–1.2)
GLUCOSE BLDC GLUCOMTR-MCNC: 104 MG/DL (ref 70–130)
GLUCOSE BLDC GLUCOMTR-MCNC: 166 MG/DL (ref 70–130)
GLUCOSE BLDC GLUCOMTR-MCNC: 80 MG/DL (ref 70–130)
GLUCOSE BLDC GLUCOMTR-MCNC: 98 MG/DL (ref 70–130)
QT INTERVAL: 372 MS
QT INTERVAL: 396 MS
QTC INTERVAL: 439 MS
QTC INTERVAL: 518 MS

## 2024-07-30 PROCEDURE — 94761 N-INVAS EAR/PLS OXIMETRY MLT: CPT

## 2024-07-30 PROCEDURE — 94799 UNLISTED PULMONARY SVC/PX: CPT

## 2024-07-30 PROCEDURE — 63710000001 INSULIN LISPRO (HUMAN) PER 5 UNITS: Performed by: INTERNAL MEDICINE

## 2024-07-30 PROCEDURE — 99232 SBSQ HOSP IP/OBS MODERATE 35: CPT | Performed by: SPECIALIST

## 2024-07-30 PROCEDURE — 80162 ASSAY OF DIGOXIN TOTAL: CPT | Performed by: NURSE PRACTITIONER

## 2024-07-30 PROCEDURE — 80069 RENAL FUNCTION PANEL: CPT | Performed by: STUDENT IN AN ORGANIZED HEALTH CARE EDUCATION/TRAINING PROGRAM

## 2024-07-30 PROCEDURE — 83735 ASSAY OF MAGNESIUM: CPT | Performed by: STUDENT IN AN ORGANIZED HEALTH CARE EDUCATION/TRAINING PROGRAM

## 2024-07-30 PROCEDURE — 82948 REAGENT STRIP/BLOOD GLUCOSE: CPT

## 2024-07-30 PROCEDURE — 99232 SBSQ HOSP IP/OBS MODERATE 35: CPT | Performed by: STUDENT IN AN ORGANIZED HEALTH CARE EDUCATION/TRAINING PROGRAM

## 2024-07-30 PROCEDURE — 93005 ELECTROCARDIOGRAM TRACING: CPT | Performed by: NURSE PRACTITIONER

## 2024-07-30 PROCEDURE — 93010 ELECTROCARDIOGRAM REPORT: CPT | Performed by: INTERNAL MEDICINE

## 2024-07-30 RX ADMIN — RIVAROXABAN 20 MG: 20 TABLET, FILM COATED ORAL at 17:00

## 2024-07-30 RX ADMIN — INSULIN LISPRO 2 UNITS: 100 INJECTION, SOLUTION INTRAVENOUS; SUBCUTANEOUS at 21:12

## 2024-07-30 RX ADMIN — METOPROLOL TARTRATE 37.5 MG: 25 TABLET, FILM COATED ORAL at 09:24

## 2024-07-30 RX ADMIN — EMPAGLIFLOZIN 10 MG: 10 TABLET, FILM COATED ORAL at 09:23

## 2024-07-30 RX ADMIN — Medication 10 ML: at 09:24

## 2024-07-30 RX ADMIN — BUDESONIDE AND FORMOTEROL FUMARATE DIHYDRATE 2 PUFF: 160; 4.5 AEROSOL RESPIRATORY (INHALATION) at 06:43

## 2024-07-30 RX ADMIN — ATORVASTATIN CALCIUM 40 MG: 40 TABLET, FILM COATED ORAL at 20:19

## 2024-07-30 RX ADMIN — BUDESONIDE AND FORMOTEROL FUMARATE DIHYDRATE 2 PUFF: 160; 4.5 AEROSOL RESPIRATORY (INHALATION) at 18:06

## 2024-07-30 RX ADMIN — FUROSEMIDE 40 MG: 40 TABLET ORAL at 09:23

## 2024-07-30 RX ADMIN — ASPIRIN 81 MG: 81 TABLET, COATED ORAL at 09:24

## 2024-07-30 RX ADMIN — CETIRIZINE HYDROCHLORIDE 10 MG: 10 TABLET, FILM COATED ORAL at 09:23

## 2024-07-30 RX ADMIN — Medication 10 ML: at 20:21

## 2024-07-30 RX ADMIN — METOPROLOL TARTRATE 37.5 MG: 25 TABLET, FILM COATED ORAL at 20:19

## 2024-07-30 RX ADMIN — DIGOXIN 125 MCG: 125 TABLET ORAL at 11:26

## 2024-07-30 NOTE — PROGRESS NOTES
[facility]  PROGRESS NOTE    Patient information:  Name: Con Colón  Age/Sex: 49 y.o. male  :  1975        PCP: Halie Dubois APRN  Attending: Davey Rubio MD  MRN:  [id number]  Visit Number:  [id number]    LOS: 5  CODE STATUS:    Code Status and Medical Interventions: CPR (Attempt to Resuscitate); Full Support   Ordered at: 24 1425     Level Of Support Discussed With:    Patient     Code Status (Patient has no pulse and is not breathing):    CPR (Attempt to Resuscitate)     Medical Interventions (Patient has pulse or is breathing):    Full Support       PROBLEM LIST:Principal Problem:    Acute respiratory failure with hypoxia      Reason for Cardiology follow-up: DAVIS and Consideration for left heart cath      Subjective   ADMISSION INFORMATION:  Chief Complaint   Patient presents with    Syncope    Shortness of Breath       DATE:2024    HPI:  Con Colón is a 49 y.o. male with a past medical history significant for nonobstructive coronary artery disease with recent CT coronary angiogram showing multivessel nonobstructive CAD with worst area being 50 to 70% stenosis in the left circumflex artery with total calcium score of 1200, HFpEF w EF of 66-70% (2024), NELY, Cardiomegaly, DMT2, HLD, HTN, COPD, history of PE anticoagulated with Xarelto and morbid obesity with a BMI of 53.     Patient presented to [facility] ([place] [facility]) emergency room (ER) on 2024 with complaints of Dyspnea and unable to walk 10 yards.      Primary Cardiologist has been JORI Moran and he was last seen in the office on 2024.      Interval History:   Telemetry reveals atrial flutter 60s. Digoxin level is 0.90.     Patient was in room 302 A when he was seen and examined.  Patient is sitting up on the edge of the bed resting quietly.  No acute distress noted at this time.  Patient denies any acute complaints other than being upset for delay in  cardioversion and requesting to be allowed to eat and reschedule cardioversion for Wednesday 7/31/2024.    ORDERS:   Rescheduled NPO to start tonight at midnight for Cardioversion on Wednesday 7/31/2024.   Healthy Heart Consistent Carb diet ordered for today.     EVENT TIMELINE:   7/29: Spoke with ZABRINA Blancas at [facility] ([facility]), outpatient appointment to arranged by his office for patient post DC for evaluation for consideration for ablation.   7/30: ECV  rescheduled for Wednesday 7/31/2024.     Objective     Vital Signs  Temp:  [97.8 °F (36.6 °C)-98.1 °F (36.7 °C)] 98 °F (36.7 °C)  Heart Rate:  [62-90] 63  Resp:  [18-20] 20  BP: (102-127)/(65-83) 102/66  Flow (L/min):  [0-4] 4  Device (Oxygen Therapy): room air  Vital Signs (last 72 hrs)         07/27 0700  07/28 0659 07/28 0700 07/29 0659 07/29 0700 07/30 0659 07/30 0700 07/30 0752   Most Recent      Temp (°F) 97.6 -  98.6    96.6 -  98.7    97.8 -  98.3       98.1 (36.7) 07/30 0622    Heart Rate 89 -  119    61 -  119    62 -  98       62 07/30 0622    Resp   20      20    18 -  20       20 07/30 0622    /71 -  142/93    93/65 -  116/76    103/65 -  127/83       112/77 07/30 0622    SpO2 (%) 90 -  98    90 -  96    91 -  96       96 07/30 0622    Flow (L/min)     0 -  4       4 07/30 0407          BMI:Body mass index is 52.23 kg/m².    WEIGHT:      07/28/24  0500 07/29/24  0500 07/30/24  0500   Weight: (!) 170 kg (375 lb 9.6 oz) (!) 171 kg (376 lb 3.2 oz) (!) 170 kg (374 lb 4.8 oz)       DIET:Diet: Regular/House, Cardiac, Diabetic; Healthy Heart (2-3 Na+); Consistent Carbohydrate; Fluid Consistency: Thin (IDDSI 0)  NPO Diet NPO Type: Sips with Meds    I&O:  Intake & Output (last 3 days)         07/27 0701 07/28 0700 07/28 0701 07/29 0700 07/29 0701 07/30 0700 07/30 0701 07/31 0700    P.O.      Total Intake(mL/kg) 480 (2.8) 600 (3.5) 1200 (7)     Urine (mL/kg/hr)   500 (0.1)     Total Output   500     Net +480 +600 +700              Urine Unmeasured Occurrence 1 x 10 x 4 x     Stool Unmeasured Occurrence  1 x               PHYSICAL EXAM:  Vitals and nursing note reviewed.   Constitutional:       Appearance: Healthy appearance. Not in distress.   HENT:      Nose: Nose normal.    Mouth/Throat:      Dentition: Normal.      Pharynx: Oropharynx is clear.   Neck:      Vascular: No JVR. JVD normal.   Pulmonary:      Effort: Pulmonary effort is normal.      Breath sounds: Normal breath sounds. No wheezing. No rhonchi. No rales.   Chest:      Chest wall: Not tender to palpatation.   Cardiovascular:      PMI at left midclavicular line. Normal rate. Irregular rhythm. Normal S1. Normal S2.       Murmurs: There is no murmur.      No gallop.  No click. No rub.   Pulses:     Intact distal pulses.   Edema:     Peripheral edema present.  Abdominal:      General: Bowel sounds are normal. There is no distension.      Palpations: Abdomen is soft.      Tenderness: There is no abdominal tenderness.   Musculoskeletal: Normal range of motion.         General: No tenderness.      Cervical back: Normal range of motion and neck supple. Skin:     General: Skin is warm and dry.      Comments: BLE with ace wrap dressings dry and intact.    Neurological:      General: No focal deficit present.      Mental Status: Alert and oriented to person, place and time.                  Results review   Results Review:    I have reviewed the patient's new clinical results. 07/30/24 12:42 EDT    Results from last 7 days   Lab Units 07/25/24  1730 07/25/24  1247 07/25/24  1038   HSTROP T ng/L 28* 33* 33*     Lab Results   Component Value Date    PROBNP 2,468.0 (H) 07/25/2024     Results from last 7 days   Lab Units 07/29/24  0127 07/28/24  0122 07/26/24  0538 07/25/24  1038   WBC 10*3/mm3 8.58 8.11 7.20 7.55   HEMOGLOBIN g/dL 15.9 15.8 15.4 16.0   PLATELETS 10*3/mm3 132* 148 142 151     Results from last 7 days   Lab Units 07/29/24  0127 07/28/24  0122 07/27/24  1434 07/27/24  0037  07/26/24  1607 07/26/24  0538 07/25/24  1038   SODIUM mmol/L 141 141 139 139  --  136 144   POTASSIUM mmol/L 4.7 4.2 4.5 4.1 3.8 3.6 4.2   CHLORIDE mmol/L 106 105 104 103  --  104 108*   CO2 mmol/L 24.0 23.6 25.2 26.1  --  20.6* 23.4   BUN mg/dL 25* 19 18 18  --  21* 22*   CREATININE mg/dL 1.27 1.08 1.10 0.98  --  1.09 1.19   CALCIUM mg/dL 9.3 9.3 9.4 9.2  --  9.1 9.2   GLUCOSE mg/dL 125* 143* 113* 99  --  125* 98   ALT (SGPT) U/L  --   --   --   --   --   --  14   AST (SGOT) U/L  --   --   --   --   --   --  18     Lab Results   Component Value Date    MG 2.2 07/29/2024    MG 2.1 07/28/2024    MG 2.1 07/27/2024     Estimated Creatinine Clearance: 114.9 mL/min (by C-G formula based on SCr of 1.27 mg/dL).    Lab Results   Component Value Date    HGBA1C 6.50 (H) 07/15/2024    HGBA1C 7.19 (H) 01/26/2022    HGBA1C 6.40 (H) 07/26/2021     Lab Results   Component Value Date    CHOL 120 01/26/2022    TRIG 49 01/26/2022    LDL 77 01/26/2022    HDL 32 (L) 01/26/2022     Lab Results   Component Value Date    ABSOLUTELUNG 35 07/26/2024     Lab Results   Component Value Date    INR 2.31 (H) 07/25/2024    INR 1.33 (H) 07/13/2024    INR 1.02 (L) 11/16/2020    INR 1.37 (L) 01/16/2020    INR 1.21 (L) 12/13/2019    INR 1.21 (L) 12/12/2019    INR 1.21 (L) 12/10/2019     Lab Results   Component Value Date    LABHEPA 0.65 07/26/2024    LABHEPA 0.96 (H) 07/26/2024    LABHEPA >1.10 (C) 07/25/2024    LABHEPA >1.10 (C) 07/25/2024       Lab Results   Component Value Date    TSH 1.630 01/26/2022      Pain Management Panel          Latest Ref Rng & Units 7/25/2024   Pain Management Panel   Amphetamine, Urine Qual Negative Negative    Barbiturates Screen, Urine Negative Negative    Benzodiazepine Screen, Urine Negative Negative    Buprenorphine, Screen, Urine Negative Negative    Cocaine Screen, Urine Negative Negative    Fentanyl, Urine Negative Negative    Methadone Screen , Urine Negative Negative    Methamphetamine, Ur Negative Negative        Details                 Microbiology Results (last 10 days)       ** No results found for the last 240 hours. **           Imaging Results (Last 24 Hours)       ** No results found for the last 24 hours. **          ECHO:  Results for orders placed during the hospital encounter of 07/25/24    Adult Transthoracic Echo Limited W/ Cont if Necessary Per Protocol    Interpretation Summary    The right ventricular cavity is dilated.    The right atrial cavity is dilated.    Very limited study  poor acoustic window  Cannot comment about wall motion abnormality  Patient is tachycardic  Right ventricle is very dilated  Echo window is not adequate to comment about ejection fraction.  Endocardium thickening cannot be evaluated,  Will recommend repeat echo with contrast or try to obtain limited echo with multiple color that can expose the endocardium and ejection fraction can be evaluated      STRESS TEST:  Results for orders placed during the hospital encounter of 07/13/24    Stress Test With Myocardial Perfusion One Day    Interpretation Summary    Myocardial perfusion imaging indicates a normal myocardial perfusion study with no evidence of ischemia.    Left ventricular ejection fraction is normal (Calculated EF = 61%).    TID 1.12.    Findings consistent with a normal ECG stress test.       HEART CATH:  No results found for this or any previous visit.      TELEMETRY:  Atrial flutter 60s            I reviewed the patient's new clinical results.    ALLERGIES: Nuvigil [armodafinil] and Provigil [modafinil]    Medication Review:   Current list of medications may not reflect those currently placed in orders that are not signed or are being held.     aspirin, 81 mg, Oral, Daily  atorvastatin, 40 mg, Oral, Nightly  budesonide-formoterol, 2 puff, Inhalation, BID - RT  cetirizine, 10 mg, Oral, Daily  cholecalciferol, 50,000 Units, Oral, Q7 Days  digoxin, 125 mcg, Oral, Daily  empagliflozin, 10 mg, Oral, Daily  furosemide, 40  mg, Oral, Daily  insulin lispro, 2-9 Units, Subcutaneous, 4x Daily AC & at Bedtime  metoprolol tartrate, 37.5 mg, Oral, Q12H  Pharmacy Consult, , Does not apply, Once  rivaroxaban, 20 mg, Oral, Daily With Dinner  sodium chloride, 10 mL, Intravenous, Q12H           albuterol    senna-docusate sodium **AND** polyethylene glycol **AND** bisacodyl **AND** bisacodyl    Calcium Replacement - Follow Nurse / BPA Driven Protocol    dextrose    dextrose    glucagon (human recombinant)    Magnesium Standard Dose Replacement - Follow Nurse / BPA Driven Protocol    nitroglycerin    Phosphorus Replacement - Follow Nurse / BPA Driven Protocol    Potassium Replacement - Follow Nurse / BPA Driven Protocol    Potassium Replacement - Follow Nurse / BPA Driven Protocol    [COMPLETED] Insert Peripheral IV **AND** sodium chloride    sodium chloride    sodium chloride    Assessment    1.  New onset atrial flutter, [id number] score of 3 currently rate controlled  2.  Acute on chronic HFpEF secondary probably to sleep apnea and an atrial flutter, currently compensated  3.  Right ventricular enlargement  4.  Right bundle branch block  5.  Obstructive sleep apnea using CPAP  6.  Nonobstructive coronary artery disease on recent coronary angiography  7.  Diabetes mellitus type 2  8.  History of pulmonary embolism patient is on anticoagulation  9.  Morbid obesity with a BMI 53            Recommendations / Plan   1.  Discussed with the patient direct current cardioversion he is agreeable to do for the procedure will keep n.p.o. after midnight  2.  Patient has classic atrial flutter can be a candidate for ablation this can be done as an outpatient can be referred to electrophysiology service as an outpatient  3.  Unable to advance GDMT medication because relative hypotension  4.  Concern with right ventricular enlargement and echocardiogram patient needs his CPAP adjusted as an outpatient  5.  Elevated troponin on admission most likely related  to atrial flutter with RVR recent coronary angiogram with nonobstructive disease  6.  Continue anticoagulation for thromboembolic prophylaxis          I have discussed the patients findings and recommendations with the patient.    Thank you very much for asking us to be involved in this patient's care.  We will follow along with you.    Electronically signed by JACEK Seo, 07/30/24, 12:42 PM EDT.   Electronically signed by Lucina Mayo MD, 07/30/24, 12:45 PM EDT.                      Please note that portions of this note were completed with a voice recognition program.    Please note that portions of this note were copied and has been reviewed and is accurate as of 7/30/2024 .

## 2024-07-30 NOTE — PLAN OF CARE
Goal Outcome Evaluation:   Pt has rested this shift. No s/s of acute distress noted at this time. Plan of care ongoing.

## 2024-07-30 NOTE — PLAN OF CARE
Goal Outcome Evaluation:    Pt is resting on the edge of bed with no s/s of distress noted. VSS. IV access maintained. NPO at midnight for possible cardioversion in the AM, consent obtained. Bi-lateral lower legs wrapped per order. Will continue with plan of care.

## 2024-07-31 ENCOUNTER — APPOINTMENT (OUTPATIENT)
Dept: CARDIOLOGY | Facility: HOSPITAL | Age: 49
End: 2024-07-31
Payer: MEDICAID

## 2024-07-31 ENCOUNTER — ANESTHESIA (OUTPATIENT)
Dept: CARDIOLOGY | Facility: HOSPITAL | Age: 49
End: 2024-07-31
Payer: MEDICAID

## 2024-07-31 ENCOUNTER — ANESTHESIA EVENT (OUTPATIENT)
Dept: CARDIOLOGY | Facility: HOSPITAL | Age: 49
End: 2024-07-31
Payer: MEDICAID

## 2024-07-31 LAB
ALBUMIN SERPL-MCNC: 3.5 G/DL (ref 3.5–5.2)
ANION GAP SERPL CALCULATED.3IONS-SCNC: 9.2 MMOL/L (ref 5–15)
BUN SERPL-MCNC: 23 MG/DL (ref 6–20)
BUN/CREAT SERPL: 21.9 (ref 7–25)
CALCIUM SPEC-SCNC: 9.2 MG/DL (ref 8.6–10.5)
CHLORIDE SERPL-SCNC: 104 MMOL/L (ref 98–107)
CO2 SERPL-SCNC: 26.8 MMOL/L (ref 22–29)
CREAT SERPL-MCNC: 1.05 MG/DL (ref 0.76–1.27)
EGFRCR SERPLBLD CKD-EPI 2021: 88.1 ML/MIN/1.73
GLUCOSE BLDC GLUCOMTR-MCNC: 108 MG/DL (ref 70–130)
GLUCOSE BLDC GLUCOMTR-MCNC: 137 MG/DL (ref 70–130)
GLUCOSE BLDC GLUCOMTR-MCNC: 194 MG/DL (ref 70–130)
GLUCOSE BLDC GLUCOMTR-MCNC: 98 MG/DL (ref 70–130)
GLUCOSE SERPL-MCNC: 136 MG/DL (ref 65–99)
MAGNESIUM SERPL-MCNC: 2.2 MG/DL (ref 1.6–2.6)
PHOSPHATE SERPL-MCNC: 4.7 MG/DL (ref 2.5–4.5)
POTASSIUM SERPL-SCNC: 4.1 MMOL/L (ref 3.5–5.2)
QT INTERVAL: 394 MS
QT INTERVAL: 410 MS
QT INTERVAL: 486 MS
QTC INTERVAL: 412 MS
QTC INTERVAL: 418 MS
QTC INTERVAL: 430 MS
SODIUM SERPL-SCNC: 140 MMOL/L (ref 136–145)

## 2024-07-31 PROCEDURE — 94664 DEMO&/EVAL PT USE INHALER: CPT

## 2024-07-31 PROCEDURE — 93005 ELECTROCARDIOGRAM TRACING: CPT | Performed by: NURSE PRACTITIONER

## 2024-07-31 PROCEDURE — 94799 UNLISTED PULMONARY SVC/PX: CPT

## 2024-07-31 PROCEDURE — 63710000001 INSULIN LISPRO (HUMAN) PER 5 UNITS: Performed by: INTERNAL MEDICINE

## 2024-07-31 PROCEDURE — 92960 CARDIOVERSION ELECTRIC EXT: CPT

## 2024-07-31 PROCEDURE — 94761 N-INVAS EAR/PLS OXIMETRY MLT: CPT

## 2024-07-31 PROCEDURE — 25010000002 PROPOFOL 200 MG/20ML EMULSION: Performed by: NURSE ANESTHETIST, CERTIFIED REGISTERED

## 2024-07-31 PROCEDURE — 92960 CARDIOVERSION ELECTRIC EXT: CPT | Performed by: INTERNAL MEDICINE

## 2024-07-31 PROCEDURE — 93005 ELECTROCARDIOGRAM TRACING: CPT | Performed by: SPECIALIST

## 2024-07-31 PROCEDURE — 99232 SBSQ HOSP IP/OBS MODERATE 35: CPT | Performed by: STUDENT IN AN ORGANIZED HEALTH CARE EDUCATION/TRAINING PROGRAM

## 2024-07-31 PROCEDURE — 5A2204Z RESTORATION OF CARDIAC RHYTHM, SINGLE: ICD-10-PCS | Performed by: INTERNAL MEDICINE

## 2024-07-31 PROCEDURE — 82948 REAGENT STRIP/BLOOD GLUCOSE: CPT

## 2024-07-31 RX ORDER — PROPOFOL 10 MG/ML
INJECTION, EMULSION INTRAVENOUS AS NEEDED
Status: DISCONTINUED | OUTPATIENT
Start: 2024-07-31 | End: 2024-07-31 | Stop reason: SURG

## 2024-07-31 RX ADMIN — ATORVASTATIN CALCIUM 40 MG: 40 TABLET, FILM COATED ORAL at 20:33

## 2024-07-31 RX ADMIN — Medication 10 ML: at 08:31

## 2024-07-31 RX ADMIN — ASPIRIN 81 MG: 81 TABLET, COATED ORAL at 08:30

## 2024-07-31 RX ADMIN — RIVAROXABAN 20 MG: 20 TABLET, FILM COATED ORAL at 17:03

## 2024-07-31 RX ADMIN — Medication 10 ML: at 20:35

## 2024-07-31 RX ADMIN — EMPAGLIFLOZIN 10 MG: 10 TABLET, FILM COATED ORAL at 08:30

## 2024-07-31 RX ADMIN — FUROSEMIDE 40 MG: 40 TABLET ORAL at 08:30

## 2024-07-31 RX ADMIN — PROPOFOL 70 MG: 10 INJECTION, EMULSION INTRAVENOUS at 10:26

## 2024-07-31 RX ADMIN — CETIRIZINE HYDROCHLORIDE 10 MG: 10 TABLET, FILM COATED ORAL at 08:30

## 2024-07-31 RX ADMIN — METOPROLOL TARTRATE 37.5 MG: 25 TABLET, FILM COATED ORAL at 08:30

## 2024-07-31 RX ADMIN — INSULIN LISPRO 2 UNITS: 100 INJECTION, SOLUTION INTRAVENOUS; SUBCUTANEOUS at 17:03

## 2024-07-31 RX ADMIN — BUDESONIDE AND FORMOTEROL FUMARATE DIHYDRATE 2 PUFF: 160; 4.5 AEROSOL RESPIRATORY (INHALATION) at 18:22

## 2024-07-31 RX ADMIN — BUDESONIDE AND FORMOTEROL FUMARATE DIHYDRATE 2 PUFF: 160; 4.5 AEROSOL RESPIRATORY (INHALATION) at 06:57

## 2024-07-31 NOTE — CASE MANAGEMENT/SOCIAL WORK
Discharge Planning Assessment  [facility]     Patient Name: Con Colón  MRN: [id number]  Today's Date: 7/31/2024    Admit Date: 7/25/2024    Plan: CM spoke with Pt at bedside. CM  followed up on this date. Pt lives alone. PCP is Halie Dubois. Pt does not utilize home health services at this time. Pt have Cpap via [employer]. Pt does not have a POA or Living will at this time. Pt plans to return home at discharge with friend to provide transportation. CM will follow.   Discharge Needs Assessment    No documentation.                  Discharge Plan       Row Name 07/31/24 1616       Plan    Plan CM spoke with Pt at bedside. CM  followed up on this date. Pt lives alone. PCP is Halie Dubois. Pt does not utilize home health services at this time. Pt have Cpap via [employer]. Pt does not have a POA or Living will at this time. Pt plans to return home at discharge with friend to provide transportation. CM will follow.                                 Brittany George RN

## 2024-07-31 NOTE — ANESTHESIA PREPROCEDURE EVALUATION
Anesthesia Evaluation     no history of anesthetic complications:   NPO Solid Status: > 8 hours  NPO Liquid Status: > 8 hours           Airway   Mallampati: II  TM distance: >3 FB  Neck ROM: full  No difficulty expected  Dental - normal exam   (+) poor dentition    Pulmonary - normal exam   (+) pulmonary embolism, COPD,sleep apnea on CPAP  Cardiovascular     Rhythm: irregular  Rate: normal    (+) hypertension, past MI , hyperlipidemia      Neuro/Psych  (+) numbness    ROS Comment: Bilat CTS  GI/Hepatic/Renal/Endo    (+) obesity, morbid obesity, diabetes mellitus    Musculoskeletal     Abdominal  - normal exam    Bowel sounds: normal.   Substance History      OB/GYN          Other        ROS/Med Hx Other: Pt denies hx of MI                Anesthesia Plan    ASA 4     general   total IV anesthesia    Anesthetic plan, risks, benefits, and alternatives have been provided, discussed and informed consent has been obtained with: patient.  Pre-procedure education provided  Plan discussed with attending.      CODE STATUS:    Level Of Support Discussed With: Patient  Code Status (Patient has no pulse and is not breathing): CPR (Attempt to Resuscitate)  Medical Interventions (Patient has pulse or is breathing): Full Support

## 2024-07-31 NOTE — PROGRESS NOTES
[facility]     Patient Identification:  Name:  Con Colón  Age:  49 y.o.  Sex:  male  :  1975  MRN:  [id number]  Visit Number:  [id number]  ROOM: [address] [place] [address]     Primary Care Provider:  Halie Dubois APRN     Date of Admission: 2024    Length of stay in inpatient status:  6    Subjective     Chief Compliant:    Chief Complaint   Patient presents with    Syncope    Shortness of Breath       Patient taken for DCCV and tolerate well this am with NSR upon return and patient alert and conversational ambulating around unit without difficulty        Objective       Vital Signs:  Temp:  [97.6 °F (36.4 °C)-98.2 °F (36.8 °C)] 97.9 °F (36.6 °C)  Heart Rate:  [47-98] 65  Resp:  [18-22] 18  BP: ()/(57-81) 103/63  SpO2:  [88 %-96 %] 96 %  on  Flow (L/min):  [0] 0;   Device (Oxygen Therapy): room air  Body mass index is 51.64 kg/m².      ----------------------------------------------------------------------------------------------------------------------  Physical Exam  Vitals and nursing note reviewed.   Constitutional:       General: He is not in acute distress.  HENT:      Head: Normocephalic and atraumatic.   Eyes:      General: No scleral icterus.     Extraocular Movements: Extraocular movements intact.   Cardiovascular:      Rate and Rhythm: Regular rhythm. Bradycardia present.      Pulses: Normal pulses.   Pulmonary:      Effort: Pulmonary effort is normal. No respiratory distress.   Abdominal:      General: There is no distension.      Palpations: Abdomen is soft.   Musculoskeletal:      Right lower leg: Edema present.      Left lower leg: Edema present.   Skin:     Comments: B/l LE ace wraps   Neurological:      General: No focal deficit present.      Mental Status: He is alert.      Cranial Nerves: No cranial nerve deficit.   Psychiatric:         Mood and Affect: Mood normal.         Behavior: Behavior normal.        ----------------------------------------------------------------------------------------------------------------------          Assessment & Plan      #New onset atrial flutter w/ RVR  - Already on xarelto. At home for hx of VTE  - Cardiology planned sotolol but not started due to prolonged QTC  - s/p DCCV 7/31 w/return to NSR with bradycardia afterwards, digoxin and metoprolol held. Cards consult reviewed, metoprolol dose reduced to 25mg BID and digoxin being held. Close outpatient f/u with EP for definitive management with mapping and ablation  -Having some brief psvt, will monitor overnight and DC in am if no significant arrhythmia or recurrence     #Hypoxia on presentation   #Exertional dyspnea  #HFpEF  #NELY  #Suspected obesity hypoventilation   - CTA chest on admission without evidence of PE  - ProBNP elevated and mild overload on plain film of chest, improved with diuresis, continue oral maintenance as per cardiology.   - Continue GDMT with BB, jardiance   - Continue nightly CPAP, sufficient spO2 on RA     #Recent NSTEMI, normal stress test, abnormal CTA  #Chronic troponin elevation   - Troponin 33=>33. Denies chest pain. No significant change on EKG.   - s/p stress test 7/15 w/no active ischemia noted     #DM II  - SSI and titrate as needed. Well controlled.      #HTN  #HLD  - Restarted home regimen      #Hx of PE  -On xarelto      Code Status and Medical Interventions: CPR (Attempt to Resuscitate); Full Support   Ordered at: 07/25/24 1425     Level Of Support Discussed With:    Patient     Code Status (Patient has no pulse and is not breathing):    CPR (Attempt to Resuscitate)     Medical Interventions (Patient has pulse or is breathing):    Full Support         Disposition: Cardiology recommend monitoring overnight, likely dc in am    I have reviewed any copied/forwarded text or data, verified its accuracy, and updated as necessary above.    Tyler Abbasi MD  [facility]  Hospitalist  07/31/24  18:48 EDT

## 2024-07-31 NOTE — PROGRESS NOTES
[facility]  PROGRESS NOTE    Patient information:  Name: Con Colón  Age/Sex: 49 y.o. male  :  1975        PCP: Halie Dubois APRN  Attending: Davey Rubio MD  MRN:  [id number]  Visit Number:  [id number]    LOS: 6  CODE STATUS:    Code Status and Medical Interventions: CPR (Attempt to Resuscitate); Full Support   Ordered at: 24 1425     Level Of Support Discussed With:    Patient     Code Status (Patient has no pulse and is not breathing):    CPR (Attempt to Resuscitate)     Medical Interventions (Patient has pulse or is breathing):    Full Support       PROBLEM LIST:Principal Problem:    Acute respiratory failure with hypoxia      Reason for Cardiology follow-up: DAVIS and Consideration for left heart cath     Subjective   ADMISSION INFORMATION:  Chief Complaint   Patient presents with    Syncope    Shortness of Breath       DATE:2024    HPI:  Con Colón is a 49 y.o. male with a past medical history significant for nonobstructive coronary artery disease with recent CT coronary angiogram showing multivessel nonobstructive CAD with worst area being 50 to 70% stenosis in the left circumflex artery with total calcium score of 1200, HFpEF w EF of 66-70% (2024), NELY, Cardiomegaly, DMT2, HLD, HTN, COPD, history of PE anticoagulated with Xarelto and morbid obesity with a BMI of 53.     Patient presented to [facility] ([facility]) emergency room (ER) on 2024 with complaints of Dyspnea and unable to walk 10 yards.      Primary Cardiologist has been JORI Moran and he was last seen in the office on 2024.     Interval History:   Telemetry reveals Atrial Flutter 80s. According to patient's I & O flow chart he had x 4 unmeasured urine occurrences throughout the night.  Patient has been held n.p.o. since midnight for planned electro cardioversion today.    Patient was in room 302 A when he was seen and examined.  Patient is  sitting up on the edge of the bed resting quietly.  No acute distress noted at this time.  Patient denies any acute complaints.    Post cardioversion patient is SB 50s and his nurse Tala RN called to report his dose of Digoxin is being HELD due to heart rate less than 60s as parameters state.      EVENT TIMELINE:   7/29: Spoke with ZABRINA Blancas at [facility] ([facility]), outpatient appointment to arranged by his office for patient post DC for evaluation for consideration for ablation.   7/31: Successful ECV today.      Objective     Vital Signs  Temp:  [97.6 °F (36.4 °C)-98.2 °F (36.8 °C)] 98 °F (36.7 °C)  Heart Rate:  [47-98] 48  Resp:  [16-22] 18  BP: ()/(57-81) 103/67  Flow (L/min):  [0-4] 0  Device (Oxygen Therapy): room air  Vital Signs (last 72 hrs)         07/28 0700 07/29 0659 07/29 0700 07/30 0659 07/30 0700 07/31 0659 07/31 0700 07/31 0821   Most Recent      Temp (°F) 96.6 -  98.7    97.8 -  98.3    98 -  98.2       98.1 (36.7) 07/31 0625    Heart Rate 61 -  119    62 -  98    61 -  98       61 07/31 0657    Resp   20    18 -  20    16 -  20       20 07/31 0657    BP 93/65 -  116/76    103/65 -  127/83    102/66 -  116/75       109/67 07/31 0625    SpO2 (%) 90 -  96    91 -  96    92 -  96       94 07/31 0657    Flow (L/min)   0 -  4    0 -  4       0 07/30 1930          BMI:Body mass index is 51.64 kg/m².    WEIGHT:      07/29/24  0500 07/30/24  0500 07/31/24  0500   Weight: (!) 171 kg (376 lb 3.2 oz) (!) 170 kg (374 lb 4.8 oz) (!) 168 kg (370 lb 1.6 oz)       DIET:Diet: Regular/House, Cardiac, Diabetic; Healthy Heart (2-3 Na+); Consistent Carbohydrate; Fluid Consistency: Thin (IDDSI 0)    I&O:  Intake & Output (last 3 days)         07/28 0701 07/29 0700 07/29 0701 07/30 0700 07/30 0701 07/31 0700 07/31 0701 08/01 0700    P.O. 600 1200 900     Total Intake(mL/kg) 600 (3.5) 1200 (7) 900 (5.2)     Urine (mL/kg/hr)  500 (0.1)      Total Output  500      Net +600 +700 +900              Urine Unmeasured Occurrence 10 x 5 x 4 x     Stool Unmeasured Occurrence 1 x                PHYSICAL EXAM:  Vitals and nursing note reviewed.   Constitutional:       Appearance: Healthy appearance. Not in distress.   HENT:      Nose: Nose normal.    Mouth/Throat:      Dentition: Normal.      Pharynx: Oropharynx is clear.   Neck:      Vascular: No JVR. JVD normal.   Pulmonary:      Effort: Pulmonary effort is normal.      Breath sounds: Normal breath sounds. No wheezing. No rhonchi. No rales.   Chest:      Chest wall: Not tender to palpatation.   Cardiovascular:      PMI at left midclavicular line. Normal rate. Irregular rhythm. Normal S1. Normal S2.       Murmurs: There is no murmur.      No gallop.  No click. No rub.   Pulses:     Intact distal pulses.   Edema:     Peripheral edema present.  Abdominal:      General: Bowel sounds are normal. There is no distension.      Palpations: Abdomen is soft.      Tenderness: There is no abdominal tenderness.   Musculoskeletal: Normal range of motion.         General: No tenderness.      Cervical back: Normal range of motion and neck supple. Skin:     General: Skin is warm and dry.      Comments: Ace wrap dressings dry and intact to BLE.    Neurological:      General: No focal deficit present.      Mental Status: Alert and oriented to person, place and time.                  Results review   Results Review:    I have reviewed the patient's new clinical results. 07/31/24 12:09 EDT    Results from last 7 days   Lab Units 07/25/24  1730 07/25/24  1247 07/25/24  1038   HSTROP T ng/L 28* 33* 33*     Lab Results   Component Value Date    PROBNP 2,468.0 (H) 07/25/2024     Results from last 7 days   Lab Units 07/29/24  0127 07/28/24  0122 07/26/24  0538 07/25/24  1038   WBC 10*3/mm3 8.58 8.11 7.20 7.55   HEMOGLOBIN g/dL 15.9 15.8 15.4 16.0   PLATELETS 10*3/mm3 132* 148 142 151     Results from last 7 days   Lab Units 07/30/24  2359 07/29/24  0127 07/28/24  0122 07/27/24  1434  07/27/24  0037 07/26/24  1607 07/26/24  0538 07/25/24  1038   SODIUM mmol/L 140 141 141 139 139  --  136 144   POTASSIUM mmol/L 4.1 4.7 4.2 4.5 4.1 3.8 3.6 4.2   CHLORIDE mmol/L 104 106 105 104 103  --  104 108*   CO2 mmol/L 26.8 24.0 23.6 25.2 26.1  --  20.6* 23.4   BUN mg/dL 23* 25* 19 18 18  --  21* 22*   CREATININE mg/dL 1.05 1.27 1.08 1.10 0.98  --  1.09 1.19   CALCIUM mg/dL 9.2 9.3 9.3 9.4 9.2  --  9.1 9.2   GLUCOSE mg/dL 136* 125* 143* 113* 99  --  125* 98   ALT (SGPT) U/L  --   --   --   --   --   --   --  14   AST (SGOT) U/L  --   --   --   --   --   --   --  18     Lab Results   Component Value Date    MG 2.2 07/30/2024    MG 2.2 07/29/2024    MG 2.1 07/28/2024     Estimated Creatinine Clearance: 137.8 mL/min (by C-G formula based on SCr of 1.05 mg/dL).    Lab Results   Component Value Date    HGBA1C 6.50 (H) 07/15/2024    HGBA1C 7.19 (H) 01/26/2022    HGBA1C 6.40 (H) 07/26/2021     Lab Results   Component Value Date    CHOL 120 01/26/2022    TRIG 49 01/26/2022    LDL 77 01/26/2022    HDL 32 (L) 01/26/2022     Lab Results   Component Value Date    ABSOLUTELUNG 35 07/26/2024     Lab Results   Component Value Date    INR 2.31 (H) 07/25/2024    INR 1.33 (H) 07/13/2024    INR 1.02 (L) 11/16/2020    INR 1.37 (L) 01/16/2020    INR 1.21 (L) 12/13/2019    INR 1.21 (L) 12/12/2019    INR 1.21 (L) 12/10/2019     Lab Results   Component Value Date    LABHEPA 0.65 07/26/2024    LABHEPA 0.96 (H) 07/26/2024    LABHEPA >1.10 (C) 07/25/2024    LABHEPA >1.10 (C) 07/25/2024       Lab Results   Component Value Date    TSH 1.630 01/26/2022      Pain Management Panel          Latest Ref Rng & Units 7/25/2024   Pain Management Panel   Amphetamine, Urine Qual Negative Negative    Barbiturates Screen, Urine Negative Negative    Benzodiazepine Screen, Urine Negative Negative    Buprenorphine, Screen, Urine Negative Negative    Cocaine Screen, Urine Negative Negative    Fentanyl, Urine Negative Negative    Methadone Screen , Urine  Negative Negative    Methamphetamine, Ur Negative Negative       Details                 Microbiology Results (last 10 days)       ** No results found for the last 240 hours. **           Imaging Results (Last 24 Hours)       ** No results found for the last 24 hours. **          ECHO:  Results for orders placed during the hospital encounter of 24    Adult Transthoracic Echo Limited W/ Cont if Necessary Per Protocol    Interpretation Summary    The right ventricular cavity is dilated.    The right atrial cavity is dilated.    Very limited study  poor acoustic window  Cannot comment about wall motion abnormality  Patient is tachycardic  Right ventricle is very dilated  Echo window is not adequate to comment about ejection fraction.  Endocardium thickening cannot be evaluated,  Will recommend repeat echo with contrast or try to obtain limited echo with multiple color that can expose the endocardium and ejection fraction can be evaluated      STRESS TEST:  Results for orders placed during the hospital encounter of 24    Stress Test With Myocardial Perfusion One Day    Interpretation Summary    Myocardial perfusion imaging indicates a normal myocardial perfusion study with no evidence of ischemia.    Left ventricular ejection fraction is normal (Calculated EF = 61%).    TID 1.12.    Findings consistent with a normal ECG stress test.       HEART CATH:  No results found for this or any previous visit.      [id number] post cardioversion       2024 pre cardioversion       TELEMETRY:  Post cardioversion SB 50s        Pre cardioversion:   Atrial Flutter 80s           I reviewed the patient's new clinical results.    ALLERGIES: Nuvigil [armodafinil] and Provigil [modafinil]    Medication Review:   Current list of medications may not reflect those currently placed in orders that are not signed or are being held.     aspirin, 81 mg, Oral, Daily  atorvastatin, 40 mg, Oral, Nightly  budesonide-formoterol, 2  puff, Inhalation, BID - RT  cetirizine, 10 mg, Oral, Daily  cholecalciferol, 50,000 Units, Oral, Q7 Days  digoxin, 125 mcg, Oral, Daily  empagliflozin, 10 mg, Oral, Daily  furosemide, 40 mg, Oral, Daily  insulin lispro, 2-9 Units, Subcutaneous, 4x Daily AC & at Bedtime  metoprolol tartrate, 37.5 mg, Oral, Q12H  Pharmacy Consult, , Does not apply, Once  rivaroxaban, 20 mg, Oral, Daily With Dinner  sodium chloride, 10 mL, Intravenous, Q12H           albuterol    senna-docusate sodium **AND** polyethylene glycol **AND** bisacodyl **AND** bisacodyl    Calcium Replacement - Follow Nurse / BPA Driven Protocol    dextrose    dextrose    glucagon (human recombinant)    Magnesium Standard Dose Replacement - Follow Nurse / BPA Driven Protocol    nitroglycerin    Phosphorus Replacement - Follow Nurse / BPA Driven Protocol    Potassium Replacement - Follow Nurse / BPA Driven Protocol    Potassium Replacement - Follow Nurse / BPA Driven Protocol    [COMPLETED] Insert Peripheral IV **AND** sodium chloride    sodium chloride    sodium chloride    Assessment      New onset atrial flutter with a [id number] score of 3, patient underwent electrocardioversion earlier today with conversion to sinus bradycardia in the 40s and 50s.  Acute on chronic HFpEF, improved.  Obstructive sleep apnea, on CPAP.  Nonobstructive coronary artery disease and recent coronary angiography.  History of pulmonary embolism, has been on chronic anticoagulation with Xarelto.    Recommendations / Plan     Will decrease the dose of the metoprolol to 25 mg p.o. twice daily and hold it for heart rate of less than 60.  Continue with Xarelto which she is already taking for PE, will help for stroke prevention from atrial fibrillation.  If he has recurrent atrial flutter, may consider EP evaluation and RF ablation.    I have discussed the patients findings and recommendations with the patient.    Thank you very much for asking us to be involved in this patient's  care.  We will follow along with you.    Electronically signed by JACEK Seo, 07/31/24, 11:57 AM EDT.     Electronically signed by Micheal Chang MD, 07/31/24, 4:24 PM EDT.          Please note that portions of this note were completed with a voice recognition program.    Please note that portions of this note were copied and has been reviewed and is accurate as of 7/31/2024 .

## 2024-07-31 NOTE — PROGRESS NOTES
[facility]     Patient Identification:  Name:  Con Colón  Age:  49 y.o.  Sex:  male  :  1975  MRN:  [id number]  Visit Number:  [id number]  ROOM: [address] [place] [address]     Primary Care Provider:  Halie Dubois APRN     Date of Admission: 2024    Length of stay in inpatient status:  5    Subjective     Chief Compliant:    Chief Complaint   Patient presents with    Syncope    Shortness of Breath       Persistent A flutter this am, agreeable to DCCV        Objective       Vital Signs:  Temp:  [98 °F (36.7 °C)-98.2 °F (36.8 °C)] 98.2 °F (36.8 °C)  Heart Rate:  [62-98] 98  Resp:  [16-20] 18  BP: (102-116)/(65-77) 104/71  SpO2:  [92 %-96 %] 93 %  on  Flow (L/min):  [4] 4;   Device (Oxygen Therapy): room air  Body mass index is 52.23 kg/m².      ----------------------------------------------------------------------------------------------------------------------  Physical Exam  Vitals and nursing note reviewed.   Constitutional:       General: He is not in acute distress.  HENT:      Head: Normocephalic and atraumatic.   Eyes:      General: No scleral icterus.     Extraocular Movements: Extraocular movements intact.   Cardiovascular:      Rate and Rhythm: Normal rate. Rhythm irregular.      Pulses: Normal pulses.   Pulmonary:      Effort: Pulmonary effort is normal. No respiratory distress.   Abdominal:      General: There is no distension.      Palpations: Abdomen is soft.   Musculoskeletal:      Right lower leg: Edema present.      Left lower leg: Edema present.   Skin:     Comments: B/l LE ace wraps   Neurological:      General: No focal deficit present.      Mental Status: He is alert.      Cranial Nerves: No cranial nerve deficit.   Psychiatric:         Mood and Affect: Mood normal.         Behavior: Behavior normal.       ----------------------------------------------------------------------------------------------------------------------          Assessment & Plan       #New onset atrial flutter w/ RVR  - Already on xarelto. At home for hx of VTE  - Cardiology planned sotolol but not started due to prolonged QTC  - Pending DCCV, unclear delay but npo at midnight with plan for DCCV in am  -Cont current metoprolol dose, cardiology recs reviewed     #Hypoxia on presentation   #Exertional dyspnea  #HFpEF  #NELY  #Suspected obesity hypoventilation   - CTA chest on admission without evidence of PE  - ProBNP elevated and mild overload on plain film of chest, improved with diuresis, continue oral maintenance as per cardiology.   - Continue GDMT with BB, jardiance   - Continue nightly CPAP, sufficient spO2 on RA     #Recent NSTEMI, normal stress test, abnormal CTA  #Chronic troponin elevation   - Troponin 33=>33. Denies chest pain. No significant change on EKG.   - s/p stress test 7/15 w/no active ischemia noted     #DM II  - SSI and titrate as needed. Well controlled.      #HTN  #HLD  - Restarted home regimen      #Hx of PE  -On xarelto      Code Status and Medical Interventions: CPR (Attempt to Resuscitate); Full Support   Ordered at: 07/25/24 1425     Level Of Support Discussed With:    Patient     Code Status (Patient has no pulse and is not breathing):    CPR (Attempt to Resuscitate)     Medical Interventions (Patient has pulse or is breathing):    Full Support         Disposition: pending DCCV in am, discharge home afterwards    I have reviewed any copied/forwarded text or data, verified its accuracy, and updated as necessary above.    Tyler Abbasi MD  [facility]  07/30/24  20:18 EDT

## 2024-07-31 NOTE — ANESTHESIA POSTPROCEDURE EVALUATION
Patient: Con Colón    Procedure Summary       Date: 07/31/24 Room / Location: [facility]    Anesthesia Start: 1024 Anesthesia Stop: 1040    Procedure: CARDIOVERSION EXTERNAL IN CARDIOLOGY DEPARTMENT Diagnosis: (Atrial Flutter)    Scheduled Providers:  Provider: Jef Sheehan MD    Anesthesia Type: general ASA Status: 4            Anesthesia Type: general    Vitals  Vitals Value Taken Time   /67 07/31/24 1129   Temp 98 °F (36.7 °C) 07/31/24 1129   Pulse 48 07/31/24 1141   Resp 18 07/31/24 1129   SpO2 95 % 07/31/24 1129           Post Anesthesia Care and Evaluation    Patient location during evaluation: floor  Patient participation: complete - patient participated  Level of consciousness: responsive to verbal stimuli  Pain score: 0  Pain management: satisfactory to patient    Airway patency: patent  Anesthetic complications: No anesthetic complications  PONV Status: none  Cardiovascular status: hemodynamically stable  Respiratory status: nasal cannula  Hydration status: acceptable

## 2024-08-01 ENCOUNTER — READMISSION MANAGEMENT (OUTPATIENT)
Dept: CALL CENTER | Facility: HOSPITAL | Age: 49
End: 2024-08-01
Payer: MEDICAID

## 2024-08-01 VITALS
HEIGHT: 71 IN | TEMPERATURE: 97.3 F | WEIGHT: 315 LBS | OXYGEN SATURATION: 94 % | DIASTOLIC BLOOD PRESSURE: 63 MMHG | BODY MASS INDEX: 44.1 KG/M2 | SYSTOLIC BLOOD PRESSURE: 107 MMHG | HEART RATE: 55 BPM | RESPIRATION RATE: 20 BRPM

## 2024-08-01 LAB
GLUCOSE BLDC GLUCOMTR-MCNC: 102 MG/DL (ref 70–130)
GLUCOSE BLDC GLUCOMTR-MCNC: 112 MG/DL (ref 70–130)
QT INTERVAL: 490 MS
QTC INTERVAL: 468 MS

## 2024-08-01 PROCEDURE — 82948 REAGENT STRIP/BLOOD GLUCOSE: CPT

## 2024-08-01 PROCEDURE — 93005 ELECTROCARDIOGRAM TRACING: CPT | Performed by: NURSE PRACTITIONER

## 2024-08-01 PROCEDURE — 99239 HOSP IP/OBS DSCHRG MGMT >30: CPT | Performed by: HOSPITALIST

## 2024-08-01 RX ADMIN — EMPAGLIFLOZIN 10 MG: 10 TABLET, FILM COATED ORAL at 09:33

## 2024-08-01 RX ADMIN — FUROSEMIDE 40 MG: 40 TABLET ORAL at 09:33

## 2024-08-01 RX ADMIN — Medication 10 ML: at 09:36

## 2024-08-01 RX ADMIN — ASPIRIN 81 MG: 81 TABLET, COATED ORAL at 09:33

## 2024-08-01 RX ADMIN — CETIRIZINE HYDROCHLORIDE 10 MG: 10 TABLET, FILM COATED ORAL at 09:33

## 2024-08-01 NOTE — PLAN OF CARE
Goal Outcome Evaluation:      Patient resting this shift. Patient has no complaints at this time. No s/s of acute distress noted at this time. Plan of care ongoing.

## 2024-08-01 NOTE — PLAN OF CARE
Goal Outcome Evaluation:               PT being discharged home today.

## 2024-08-01 NOTE — PROGRESS NOTES
[facility]  PROGRESS NOTE    Patient information:  Name: Con Colón  Age/Sex: 49 y.o. male  :  1975        PCP: Halie Dubois APRN  Attending: Davey Rubio MD  MRN:  [id number]  Visit Number:  [id number]    LOS: 7  CODE STATUS:    Code Status and Medical Interventions: CPR (Attempt to Resuscitate); Full Support   Ordered at: 24 1425     Level Of Support Discussed With:    Patient     Code Status (Patient has no pulse and is not breathing):    CPR (Attempt to Resuscitate)     Medical Interventions (Patient has pulse or is breathing):    Full Support       PROBLEM LIST:Principal Problem:    Acute respiratory failure with hypoxia      Reason for Cardiology follow-up: DAVIS and Consideration for left heart cath     Subjective   ADMISSION INFORMATION:  Chief Complaint   Patient presents with    Syncope    Shortness of Breath       DATE:2024    HPI:  Con Colón is a 49 y.o. male with a past medical history significant for nonobstructive coronary artery disease with recent CT coronary angiogram showing multivessel nonobstructive CAD with worst area being 50 to 70% stenosis in the left circumflex artery with total calcium score of 1200, HFpEF w EF of 66-70% (2024), NELY, Cardiomegaly, DMT2, HLD, HTN, COPD, history of PE anticoagulated with Xarelto and morbid obesity with a BMI of 53.     Patient presented to [facility] ([facility]) emergency room (ER) on 2024 with complaints of Dyspnea and unable to walk 10 yards.      Primary Cardiologist has been JORI Moran and he was last seen in the office on 2024.     Interval History:   Telemetry reveals SB 40-50s with brief episodes of PSVT/ AB Cond. As seen in telemetry strips attached below.  Patient did not get his evening dose of metoprolol 25 mg secondary to heart rate in the 50s as documented on MAR.    Patient was in room 302 A when he was seen and examined.  Patient is  sitting up on the edge of the bed resting quietly.  No acute distress noted at this time.  Patient currently has no acute complaints. He denies dizziness, lightheadedness or feeling weak.     ORDERS:   DC Metoprolol tartrate   FU with Kirill Grover in 2-3 weeks placed in ADT DC area.     EVENT TIMELINE:   7/29: Spoke with ZABRINA Blancas at [facility] outpatient appointment to arranged by his office for patient post DC for evaluation for consideration for ablation.   7/31: Successful ECV today.      Objective     Vital Signs  Temp:  [97.5 °F (36.4 °C)-98.7 °F (37.1 °C)] 97.6 °F (36.4 °C)  Heart Rate:  [45-67] 67  Resp:  [18-20] 20  BP: (102-130)/(63-87) 119/85  Device (Oxygen Therapy): other (see comments)  Vital Signs (last 72 hrs)         07/29 0700 07/30 0659 07/30 0700 07/31 0659 07/31 0700 08/01 0659 08/01 0700  08/01 0845   Most Recent      Temp (°F) 97.8 -  98.3    98 -  98.2    97.5 -  98.7       97.6 (36.4) 08/01 0609    Heart Rate 62 -  98    61 -  98    45 -  65       45 08/01 0641    Resp 18 -  20    16 -  20    18 -  22       20 08/01 0641    /65 -  127/83    102/66 -  116/75    97/57 -  130/87       119/85 08/01 0609    SpO2 (%) 91 -  96    92 -  96    88 -  96       94 08/01 0641    Flow (L/min) 0 -  4    0 -  4         0 07/30 1930          BMI:Body mass index is 51.93 kg/m².    WEIGHT:      07/30/24  0500 07/31/24  0500 08/01/24  0500   Weight: (!) 170 kg (374 lb 4.8 oz) (!) 168 kg (370 lb 1.6 oz) (!) 169 kg (372 lb 3.2 oz)       DIET:Diet: Regular/House, Cardiac, Diabetic; Healthy Heart (2-3 Na+); Consistent Carbohydrate; Fluid Consistency: Thin (IDDSI 0)    I&O:  Intake & Output (last 3 days)         07/29 0701 07/30 0700 07/30 0701 07/31 0700 07/31 0701 08/01 0700 08/01 0701 08/02 0700    P.O. 1200 900 900     Total Intake(mL/kg) 1200 (7) 900 (5.2) 900 (5.2)     Urine (mL/kg/hr) 500 (0.1)  1250 (0.3)     Stool   0     Total Output 500  1250     Net +700 +900 -350              Urine Unmeasured Occurrence 5 x 4 x 3 x     Stool Unmeasured Occurrence   3 x             PHYSICAL EXAM:  Constitutional:       Appearance: Healthy appearance. Not in distress.      Comments: BMI 51.93   Eyes:      Conjunctiva/sclera: Conjunctivae normal.      Pupils: Pupils are equal, round, and reactive to light.   HENT:      Nose: Nose normal.    Mouth/Throat:      Dentition: No dental caries.      Pharynx: Oropharynx is clear.   Neck:      Vascular: No JVR. JVD normal.   Pulmonary:      Effort: Pulmonary effort is normal.      Breath sounds: Normal breath sounds. No wheezing. No rhonchi. No rales.   Chest:      Chest wall: Not tender to palpatation.   Cardiovascular:      PMI at left midclavicular line. Normal rate. Regular rhythm. Normal S1. Normal S2.       Murmurs: There is no murmur.      No gallop.  No click. No rub.   Pulses:     Intact distal pulses.   Edema:     Peripheral edema present.  Abdominal:      General: Bowel sounds are normal.      Palpations: Abdomen is soft.      Tenderness: There is no abdominal tenderness.   Musculoskeletal: Normal range of motion.         General: No tenderness.      Cervical back: Normal range of motion and neck supple. Skin:     General: Skin is warm and dry.      Comments: Dry and intact ace wrap dressings to BLE.   Neurological:      General: No focal deficit present.      Mental Status: Alert and oriented to person, place and time.                  Results review   Results Review:    I have reviewed the patient's new clinical results. 08/01/24 10:45 EDT    Results from last 7 days   Lab Units 07/25/24  1730 07/25/24  1247   HSTROP T ng/L 28* 33*     Lab Results   Component Value Date    PROBNP 2,468.0 (H) 07/25/2024     Results from last 7 days   Lab Units 07/29/24  0127 07/28/24  0122 07/26/24  0538   WBC 10*3/mm3 8.58 8.11 7.20   HEMOGLOBIN g/dL 15.9 15.8 15.4   PLATELETS 10*3/mm3 132* 148 142     Results from last 7 days   Lab Units 07/30/24  9761  07/29/24  0127 07/28/24  0122 07/27/24  1434 07/27/24  0037 07/26/24  1607 07/26/24  0538   SODIUM mmol/L 140 141 141 139 139  --  136   POTASSIUM mmol/L 4.1 4.7 4.2 4.5 4.1 3.8 3.6   CHLORIDE mmol/L 104 106 105 104 103  --  104   CO2 mmol/L 26.8 24.0 23.6 25.2 26.1  --  20.6*   BUN mg/dL 23* 25* 19 18 18  --  21*   CREATININE mg/dL 1.05 1.27 1.08 1.10 0.98  --  1.09   CALCIUM mg/dL 9.2 9.3 9.3 9.4 9.2  --  9.1   GLUCOSE mg/dL 136* 125* 143* 113* 99  --  125*     Lab Results   Component Value Date    MG 2.2 07/30/2024    MG 2.2 07/29/2024    MG 2.1 07/28/2024     Estimated Creatinine Clearance: 139 mL/min (by C-G formula based on SCr of 1.05 mg/dL).    Lab Results   Component Value Date    HGBA1C 6.50 (H) 07/15/2024    HGBA1C 7.19 (H) 01/26/2022    HGBA1C 6.40 (H) 07/26/2021     Lab Results   Component Value Date    CHOL 120 01/26/2022    TRIG 49 01/26/2022    LDL 77 01/26/2022    HDL 32 (L) 01/26/2022     Lab Results   Component Value Date    ABSOLUTELUNG 35 07/26/2024     Lab Results   Component Value Date    INR 2.31 (H) 07/25/2024    INR 1.33 (H) 07/13/2024    INR 1.02 (L) 11/16/2020    INR 1.37 (L) 01/16/2020    INR 1.21 (L) 12/13/2019    INR 1.21 (L) 12/12/2019    INR 1.21 (L) 12/10/2019     Lab Results   Component Value Date    LABHEPA 0.65 07/26/2024    LABHEPA 0.96 (H) 07/26/2024    LABHEPA >1.10 (C) 07/25/2024    LABHEPA >1.10 (C) 07/25/2024       Lab Results   Component Value Date    TSH 1.630 01/26/2022      Pain Management Panel          Latest Ref Rng & Units 7/25/2024   Pain Management Panel   Amphetamine, Urine Qual Negative Negative    Barbiturates Screen, Urine Negative Negative    Benzodiazepine Screen, Urine Negative Negative    Buprenorphine, Screen, Urine Negative Negative    Cocaine Screen, Urine Negative Negative    Fentanyl, Urine Negative Negative    Methadone Screen , Urine Negative Negative    Methamphetamine, Ur Negative Negative       Details                     ECHO:  Results for  orders placed during the hospital encounter of 24    Adult Transthoracic Echo Limited W/ Cont if Necessary Per Protocol    Interpretation Summary    The right ventricular cavity is dilated.    The right atrial cavity is dilated.    Very limited study  poor acoustic window  Cannot comment about wall motion abnormality  Patient is tachycardic  Right ventricle is very dilated  Echo window is not adequate to comment about ejection fraction.  Endocardium thickening cannot be evaluated,  Will recommend repeat echo with contrast or try to obtain limited echo with multiple color that can expose the endocardium and ejection fraction can be evaluated      STRESS TEST:  Results for orders placed during the hospital encounter of 24    Stress Test With Myocardial Perfusion One Day    Interpretation Summary    Myocardial perfusion imaging indicates a normal myocardial perfusion study with no evidence of ischemia.    Left ventricular ejection fraction is normal (Calculated EF = 61%).    TID 1.12.    Findings consistent with a normal ECG stress test.       HEART CATH:  No results found for this or any previous visit.      [id number]      TELEMETRY:      SB 40-50s with brief episodes of PSVT/ AB Cond. As seen in telemetry strips attached below.                            I reviewed the patient's new clinical results.    ALLERGIES: Nuvigil [armodafinil] and Provigil [modafinil]    Medication Review:   Current list of medications may not reflect those currently placed in orders that are not signed or are being held.     aspirin, 81 mg, Oral, Daily  atorvastatin, 40 mg, Oral, Nightly  budesonide-formoterol, 2 puff, Inhalation, BID - RT  cetirizine, 10 mg, Oral, Daily  cholecalciferol, 50,000 Units, Oral, Q7 Days  [Held by provider] digoxin, 125 mcg, Oral, Daily  empagliflozin, 10 mg, Oral, Daily  furosemide, 40 mg, Oral, Daily  insulin lispro, 2-9 Units, Subcutaneous, 4x Daily AC & at Bedtime  Pharmacy Consult, , Does  not apply, Once  rivaroxaban, 20 mg, Oral, Daily With Dinner  sodium chloride, 10 mL, Intravenous, Q12H           albuterol    senna-docusate sodium **AND** polyethylene glycol **AND** bisacodyl **AND** bisacodyl    Calcium Replacement - Follow Nurse / BPA Driven Protocol    dextrose    dextrose    glucagon (human recombinant)    Magnesium Standard Dose Replacement - Follow Nurse / BPA Driven Protocol    nitroglycerin    Phosphorus Replacement - Follow Nurse / BPA Driven Protocol    Potassium Replacement - Follow Nurse / BPA Driven Protocol    Potassium Replacement - Follow Nurse / BPA Driven Protocol    [COMPLETED] Insert Peripheral IV **AND** sodium chloride    sodium chloride    sodium chloride    Assessment      New onset atrial flutter with a [id number] of 3, patient underwent electrocardioversion yesterday and is maintaining sinus rhythm in the 50s to 60s, clinically asymptomatic and stable.  Acute on chronic HFpEF, resolved.  Obstructive sleep apnea, on CPAP chronically.  Nonobstructive coronary artery disease on recent CT coronary angiography.  History of pulmonary embolism, has been on chronic anticoagulation with Xarelto.      Recommendations / Plan     Since patient still has episodes of sinus bradycardia, will go ahead and discontinue metoprolol altogether.  I have discussed this with Mr. Colón and he expressed understanding.  Continue with Xarelto for stroke prevention and as well as for his PE.  If he has recurrent atrial flutter, will consider EP evaluation and RF ablation.  Patient otherwise appears stable for discharge home from cardiac standpoint and follow-up in our office in a couple of weeks.        I have discussed the patients findings and recommendations with the patient.    Thank you very much for asking us to be involved in this patient's care.  We will follow along with you.    Electronically signed by JACEK Seo, 08/01/24, 10:45 AM EDT.     Electronically signed by Micheal  JEANETTE Chang MD, 08/01/24, 11:26 AM EDT.            Please note that portions of this note were completed with a voice recognition program.    Please note that portions of this note were copied and has been reviewed and is accurate as of 8/1/2024 .

## 2024-08-01 NOTE — DISCHARGE SUMMARY
[facility] HOSPITALIST MEDICINE DISCHARGE SUMMARY    Patient Identification:  Name:  Con Colón  Age:  49 y.o.  Sex:  male  :  1975  MRN:  [id number]  Visit Number:  [id number]    Date of Admission: 2024  Date of Discharge: 2024  DISCHARGE DISPOSITION   Stable  PCP: Halie Dubois, JACEK    DISCHARGE DIAGNOSIS : New onset a flutter with RVR, hypoxia on presentation resolved, exertional dyspnea, heart failure with preserved ejection fraction with signs of early decompensation improved with diuretics, morbid obesity with a BMI of 52 complicating all aspects of care, history of recent non-STEMI normal stress test with abnormal CTA, chronic troponin elevation, suspect obesity hypoventilation syndrome, history of NELY, diabetes type 2 fairly controlled, history of hyperlipidemia, history of hypertension, history of left lower extremity DVT and PE on chronic anticoagulation, near-syncope secondary to A- flutter with RVR CHADS-VASc score 3, chronic anticoagulation for PE and DVT.    HOSPITAL COURSE  Patient is a 49 y.o. male presented to [facility] complaining of dyspnea on exertion, near syncope palpitation, on evaluation patient was hypoxic but resolved, he was noncompliant on oxygen, he was noted to have a flutter new onset with RVR.  Attempts made by cardiology to control RVR but failed, CT of the chest is negative for PE, 2D echo shows normal ejection fraction with no wall motion abnormality.  Troponin elevated but remained flat, CT coronary angiogram revealing nonobstructive disease, no indication for angiography per cardiology, aggressive risk factor modification.  Also started on Lasix due to congestion on x-ray of the chest.  Due to poor rate control, patient eventually cardioverted to sinus rhythm and has maintained sinus rhythm since then.  He did develop bradycardia because of this cardiology recommended to discontinue digoxin and beta-blocker.  Cardiology  recommended patient to have EP evaluation if atrial flutter recurs.  Patient has been cleared by cardiology, to follow-up outpatient heart failure clinic as well as general cardiology for atrial flutter with RVR.  Patient advised to seek medical help immediately should he develop dyspnea on exertion palpitation or recurrence of arrhythmia.      VITAL SIGNS:      07/30/24  0500 07/31/24  0500 08/01/24  0500   Weight: (!) 170 kg (374 lb 4.8 oz) (!) 168 kg (370 lb 1.6 oz) (!) 169 kg (372 lb 3.2 oz)     Body mass index is 51.93 kg/m².  Vitals:    08/01/24 1100   BP: 107/63   Pulse: 55   Resp: 20   Temp: 97.3 °F (36.3 °C)   SpO2: 94%     PHYSICAL EXAM:  General: Comfortable,awake, alert, oriented to self, place, and time, sitting at the edge of the bed morbidly obese, conversant,   No respiratory distress.    Skin:  Skin is warm and dry. No rash noted. No pallor.    HENT:  Head:  Normocephalic and atraumatic.  Mouth:  Moist mucous membranes.    Eyes:  Conjunctivae and EOM are normal.  Pupils are equal, round, and reactive to light.  No scleral icterus.    Neck:  Neck supple.  No JVD present.  No hepatojugular reflux  Pulmonary/Chest:  No respiratory distress, no wheezes, no crackles, with normal breath sounds and good air movement.  Cardiovascular:  Normal rate, regular rhythm and normal heart sounds with no murmur.  Abdominal:  Soft.  Bowel sounds are normal.  No distension and no tenderness.  Obese  Extremities: Stasis and edema of both lower extremity with stasis discoloration of both lower extremity.  Pedal pulse no cyanosis or clubbing.   Neurological:  Motor strength equal no obvious deficit, sensory grossly intact.   No cranial nerve deficit.  No tongue deviation.  No facial droop.  No slurred speech.    Genitourinary: No Guzman catheter  Back:  ----------    DISCHARGE MEDICATIONS:     Discharge Medications        Continue These Medications        Instructions Start Date   albuterol sulfate  (90 Base)  MCG/ACT inhaler  Commonly known as: PROVENTIL HFA;VENTOLIN HFA;PROAIR HFA   2 puffs, Inhalation, Every 6 Hours PRN      aspirin 81 MG EC tablet   81 mg, Oral, Daily      atorvastatin 40 MG tablet  Commonly known as: LIPITOR   40 mg, Oral, Nightly      budesonide-formoterol 160-4.5 MCG/ACT inhaler  Commonly known as: SYMBICORT   2 puffs, Inhalation, 2 Times Daily PRN      cetirizine 10 MG tablet  Commonly known as: zyrTEC   10 mg, Oral, Daily      cholecalciferol 1.25 MG (32141 UT) capsule  Commonly known as: VITAMIN D3   50,000 Units, Oral, Every 7 Days      empagliflozin 10 MG tablet tablet  Commonly known as: JARDIANCE   10 mg, Oral, Daily      furosemide 40 MG tablet  Commonly known as: LASIX   40 mg, Oral, Daily      metFORMIN 1000 MG tablet  Commonly known as: GLUCOPHAGE   1,000 mg, Oral, 2 Times Daily With Meals      rivaroxaban 20 MG tablet  Commonly known as: XARELTO   20 mg, Oral, Daily With Dinner             Stop These Medications      lisinopril 40 MG tablet  Commonly known as: PRINIVIL,ZESTRIL     metoprolol tartrate 25 MG tablet  Commonly known as: LOPRESSOR                Your Scheduled Appointments      Aug 27, 2024 1:00 PM  New Patient with Dwayne Espitia MD  [facility] MEDICAL [facility] CARDIOLOGY ([address] [id number] [phone]   -Bring photo ID, insurance card, and list of medications to appointment  -If testing was completed outside of [facility] then patient must bring images on a disc  -Copay will be collected at time of appointment  -New patients should arrive 15 minutes prior to appointment  If your symptoms change or worsen, please contact your PCP or go to the nearest emergency room.                Additional Instructions for the Follow-ups that You Need to Schedule       Discharge Follow-up with PCP   As directed       Currently Documented PCP:    Halie Dubois APRN    PCP Phone Number:    [phone]     Follow Up Details: Halie Dubois  JACEK (posthospitalization follow-up)        Discharge Follow-up with Specified Provider: Cardiology; 2 Weeks   As directed      To: Cardiology   Follow Up: 2 Weeks   Follow Up Details: A flutter with RVR status post cardioversion               Follow-up Information       [facility] .    Specialty: Cardiology  Contact information:  [address] [place] [id number] [phone]             Kirill Grover PA-C Follow up in 2 week(s).    Specialties: Physician Assistant, Cardiology  Contact information:  [address] [id number]  [phone]               Halie Dubois APRN .    Specialty: Nurse Practitioner  Why: JACEK Watson (posthospitalization follow-up)  Contact information:  [address] [place] [id number] [phone]                                  Lyudmila Rabago MD  08/01/24  12:01 EDT    Please note that this discharge summary required more than 30 minutes to complete.

## 2024-08-02 ENCOUNTER — TELEPHONE (OUTPATIENT)
Dept: TELEMETRY | Facility: HOSPITAL | Age: 49
End: 2024-08-02
Payer: MEDICAID

## 2024-08-02 NOTE — PAYOR COMM NOTE
"CONTACT:  CATIE MENDOZA RN  UTILIZATION MANAGEMENT DEPT.   [facility]   [address] [facility], [address]   PHONE:  [phone]   FAX: [phone]         [place] HOME 2024      REF # [id number]          Basia Ye (49 y.o. Male)       Date of Birth   1975    Social Security Number       Address   [address] [facility] [address]    Home Phone   [phone]    MRN   [id number]       Pentecostalism   Restorationist    Marital Status                               Admission Date   24    Admission Type   Emergency    Admitting Provider   Davey Rubio MD    Attending Provider       Department, Room/Bed   [facility], 3302/1S       Discharge Date   2024    Discharge Disposition   Home or Self Care    Discharge Destination   Home                              Attending Provider: (none)   Allergies: Nuvigil [Armodafinil], Provigil [Modafinil]    Isolation: None   Infection: None   Code Status: Prior    Ht: 180.3 cm (70.98\")   Wt: 169 kg (372 lb 3.2 oz)    Admission Cmt: None   Principal Problem: Acute respiratory failure with hypoxia [J96.01]                   Active Insurance as of 2024       Primary Coverage       Payor Plan Insurance Group Employer/Plan Group    [facility] COMMUNITY PLAN [facility] COMMUNITY PLAN [facility]       Payor Plan Address Payor Plan Phone Number Payor Plan Fax Number Effective Dates    [address]   2021 - None Entered    [facility] [address]         Subscriber Name Subscriber Birth Date Member ID       BASIA YE 1975 [id number]                     Emergency Contacts        (Rel.) Home Phone Work Phone Mobile Phone    BENJIE EL (Friend) -- -- [phone]                 Discharge Summary        Lyudmila Rabago MD at [address] [place] [address] HOSPITALIST MEDICINE DISCHARGE SUMMARY    Patient Identification:  Name:  Basia Ye  Age:  49 y.o.  Sex:  male  :  " 1975  MRN:  [id number]  Visit Number:  [id number]    Date of Admission: 7/25/2024  Date of Discharge: August 1, 2024  DISCHARGE DISPOSITION   Stable  PCP: Halie Dubois, JACEK    DISCHARGE DIAGNOSIS : New onset a flutter with RVR, hypoxia on presentation resolved, exertional dyspnea, heart failure with preserved ejection fraction with signs of early decompensation improved with diuretics, morbid obesity with a BMI of 52 complicating all aspects of care, history of recent non-STEMI normal stress test with abnormal CTA, chronic troponin elevation, suspect obesity hypoventilation syndrome, history of NELY, diabetes type 2 fairly controlled, history of hyperlipidemia, history of hypertension, history of left lower extremity DVT and PE on chronic anticoagulation, near-syncope secondary to A- flutter with RVR CHADS-VASc score 3, chronic anticoagulation for PE and DVT.    HOSPITAL COURSE  Patient is a 49 y.o. male presented to [facility] complaining of dyspnea on exertion, near syncope palpitation, on evaluation patient was hypoxic but resolved, he was noncompliant on oxygen, he was noted to have a flutter new onset with RVR.  Attempts made by cardiology to control RVR but failed, CT of the chest is negative for PE, 2D echo shows normal ejection fraction with no wall motion abnormality.  Troponin elevated but remained flat, CT coronary angiogram revealing nonobstructive disease, no indication for angiography per cardiology, aggressive risk factor modification.  Also started on Lasix due to congestion on x-ray of the chest.  Due to poor rate control, patient eventually cardioverted to sinus rhythm and has maintained sinus rhythm since then.  He did develop bradycardia because of this cardiology recommended to discontinue digoxin and beta-blocker.  Cardiology recommended patient to have EP evaluation if atrial flutter recurs.  Patient has been cleared by cardiology, to follow-up outpatient heart failure  clinic as well as general cardiology for atrial flutter with RVR.  Patient advised to seek medical help immediately should he develop dyspnea on exertion palpitation or recurrence of arrhythmia.      VITAL SIGNS:      07/30/24  0500 07/31/24  0500 08/01/24  0500   Weight: (!) 170 kg (374 lb 4.8 oz) (!) 168 kg (370 lb 1.6 oz) (!) 169 kg (372 lb 3.2 oz)     Body mass index is 51.93 kg/m².  Vitals:    08/01/24 1100   BP: 107/63   Pulse: 55   Resp: 20   Temp: 97.3 °F (36.3 °C)   SpO2: 94%     PHYSICAL EXAM:  General: Comfortable,awake, alert, oriented to self, place, and time, sitting at the edge of the bed morbidly obese, conversant,   No respiratory distress.    Skin:  Skin is warm and dry. No rash noted. No pallor.    HENT:  Head:  Normocephalic and atraumatic.  Mouth:  Moist mucous membranes.    Eyes:  Conjunctivae and EOM are normal.  Pupils are equal, round, and reactive to light.  No scleral icterus.    Neck:  Neck supple.  No JVD present.  No hepatojugular reflux  Pulmonary/Chest:  No respiratory distress, no wheezes, no crackles, with normal breath sounds and good air movement.  Cardiovascular:  Normal rate, regular rhythm and normal heart sounds with no murmur.  Abdominal:  Soft.  Bowel sounds are normal.  No distension and no tenderness.  Obese  Extremities: Stasis and edema of both lower extremity with stasis discoloration of both lower extremity.  Pedal pulse no cyanosis or clubbing.   Neurological:  Motor strength equal no obvious deficit, sensory grossly intact.   No cranial nerve deficit.  No tongue deviation.  No facial droop.  No slurred speech.    Genitourinary: No Guzman catheter  Back:  ----------    DISCHARGE MEDICATIONS:     Discharge Medications        Continue These Medications        Instructions Start Date   albuterol sulfate  (90 Base) MCG/ACT inhaler  Commonly known as: PROVENTIL HFA;VENTOLIN HFA;PROAIR HFA   2 puffs, Inhalation, Every 6 Hours PRN      aspirin 81 MG EC tablet   81 mg,  Oral, Daily      atorvastatin 40 MG tablet  Commonly known as: LIPITOR   40 mg, Oral, Nightly      budesonide-formoterol 160-4.5 MCG/ACT inhaler  Commonly known as: SYMBICORT   2 puffs, Inhalation, 2 Times Daily PRN      cetirizine 10 MG tablet  Commonly known as: zyrTEC   10 mg, Oral, Daily      cholecalciferol 1.25 MG (96720 UT) capsule  Commonly known as: VITAMIN D3   50,000 Units, Oral, Every 7 Days      empagliflozin 10 MG tablet tablet  Commonly known as: JARDIANCE   10 mg, Oral, Daily      furosemide 40 MG tablet  Commonly known as: LASIX   40 mg, Oral, Daily      metFORMIN 1000 MG tablet  Commonly known as: GLUCOPHAGE   1,000 mg, Oral, 2 Times Daily With Meals      rivaroxaban 20 MG tablet  Commonly known as: XARELTO   20 mg, Oral, Daily With Dinner             Stop These Medications      lisinopril 40 MG tablet  Commonly known as: PRINIVIL,ZESTRIL     metoprolol tartrate 25 MG tablet  Commonly known as: LOPRESSOR                Your Scheduled Appointments      Aug 27, 2024 1:00 PM  New Patient with Dwayne Espitia MD  [facility] ([place]) [address] [place] [address] [phone]   -Bring photo ID, insurance card, and list of medications to appointment  -If testing was completed outside of [facility] then patient must bring images on a disc  -Copay will be collected at time of appointment  -New patients should arrive 15 minutes prior to appointment  If your symptoms change or worsen, please contact your PCP or go to the nearest emergency room.                Additional Instructions for the Follow-ups that You Need to Schedule       Discharge Follow-up with PCP   As directed       Currently Documented PCP:    Halie Dubois APRN    PCP Phone Number:    [phone]     Follow Up Details: JACEK Watson (posthospitalization follow-up)        Discharge Follow-up with Specified Provider: Cardiology; 2 Weeks   As directed      To: Cardiology   Follow  Up: 2 Weeks   Follow Up Details: A flutter with RVR status post cardioversion               Follow-up Information       [facility] .    Specialty: Cardiology  Contact information:  [address]  [facility] [address] [phone]             Kirill Grover PA-C Follow up in 2 week(s).    Specialties: Physician Assistant, Cardiology  Contact information:  [address]  [facility] [address]  [phone]               Halie Dubois APRN .    Specialty: Nurse Practitioner  Why: JACEK Watson (posthospitalization follow-up)  Contact information:  [address] [place] [address] [phone]                                  Sam Phillips MD  08/01/24  12:01 EDT    Please note that this discharge summary required more than 30 minutes to complete.      Electronically signed by Sam Phillips MD at 08/01/24 1212       Discharge Order (From admission, onward)       Start     Ordered    08/01/24 1128  Discharge patient  Once        Expected Discharge Date: 08/01/24   Discharge Disposition: Home or Self Care   Physician of Record for Attribution - Please select from Treatment Team: SAM PHILLIPS [[id number]]   Review needed by CMO to determine Physician of Record: No      Question Answer Comment   Physician of Record for Attribution - Please select from Treatment Team SAM PHILLIPS    Review needed by CMO to determine Physician of Record No        08/01/24 1200

## 2024-08-02 NOTE — OUTREACH NOTE
Prep Survey      Flowsheet Row Responses   [facility] facility patient discharged from? [facility]   Is LACE score < 7 ? No   Eligibility Readm Mgmt   Discharge diagnosis CHF-Acute respiratory failure with hypoxia   Does the patient have one of the following disease processes/diagnoses(primary or secondary)? CHF   Does the patient have Home health ordered? No   Is there a DME ordered? No   Prep survey completed? Yes            OSCAR MARTINEZ - Registered Nurse

## 2024-08-06 ENCOUNTER — READMISSION MANAGEMENT (OUTPATIENT)
Dept: CALL CENTER | Facility: HOSPITAL | Age: 49
End: 2024-08-06
Payer: MEDICAID

## 2024-08-06 NOTE — OUTREACH NOTE
CHF Week 1 Survey      Flowsheet Row Responses   [facility] [place] patient discharged from? [facility]   Does the patient have one of the following disease processes/diagnoses(primary or secondary)? CHF   CHF Week 1 attempt successful? Yes   Call end time 1202   Discharge diagnosis CHF-Acute respiratory failure with hypoxia   Meds reviewed with patient/caregiver? Yes   Is the patient having any side effects they believe may be caused by any medication additions or changes? No   Does the patient have all medications ordered at discharge? Yes   Is the patient taking all medications as directed (includes completed medication regime)? Yes   Does the patient have a primary care provider?  Yes   Does the patient have an appointment with their PCP within 7 days of discharge? Greater than 7 days   What is preventing the patient from scheduling follow up appointments within 7 days of discharge? Earlier appointment not available   Nursing Interventions Verified appointment date/time/provider   Has the patient kept scheduled appointments due by today? N/A   Has home health visited the patient within 72 hours of discharge? N/A   Pulse Ox monitoring None   Psychosocial issues? No   Did the patient receive a copy of their discharge instructions? Yes   Nursing interventions Reviewed instructions with patient   What is the patient's perception of their health status since discharge? Improving   Nursing interventions Nurse provided patient education   Is the patient able to teach back signs and symptoms of worsening condition? (i.e. weight gain, shortness of air, etc.) Yes   If the patient is a current smoker, are they able to teach back resources for cessation? Not a smoker  [chews tobacco]   Is the patient/caregiver able to teach back the hierarchy of who to call/visit for symptoms/problems? PCP, Specialist, Home health nurse, Urgent Care, ED, 911 Yes   Additional teach back comments states able to walk farther, working in yard, more  active with less SOB, watches closely for water weight gain, wears compression socks   Is the patient able to teach back Heart Failure Zones? Yes   CHF Zone this Call Green Zone   Green Zone Patient reports doing well, No new swelling -  feet, ankles and legs look normal for you, Physical activity level is normal for you, No new or worsening shortness of breath, Weight check stable, No chest pain   Green Zone Interventions Daily weight check, Meds as directed, Low sodium diet, Follow up visits planned    CHF Week 1 call completed? Yes   Call end time 1202            Savanna RUIZ - Registered Nurse

## 2024-08-08 ENCOUNTER — HOSPITAL ENCOUNTER (OUTPATIENT)
Dept: CARDIOLOGY | Facility: HOSPITAL | Age: 49
Discharge: HOME | End: 2024-08-08
Payer: MEDICAID

## 2024-08-08 VITALS
WEIGHT: 315 LBS | HEIGHT: 71 IN | HEART RATE: 85 BPM | BODY MASS INDEX: 44.1 KG/M2 | SYSTOLIC BLOOD PRESSURE: 123 MMHG | DIASTOLIC BLOOD PRESSURE: 79 MMHG | OXYGEN SATURATION: 90 %

## 2024-08-08 DIAGNOSIS — I50.32 CHRONIC HEART FAILURE WITH PRESERVED EJECTION FRACTION (HFPEF): Primary | ICD-10-CM

## 2024-08-08 DIAGNOSIS — I48.0 PAROXYSMAL ATRIAL FIBRILLATION: ICD-10-CM

## 2024-08-08 DIAGNOSIS — M19.90 OTHER TYPE OF OSTEOARTHRITIS, UNSPECIFIED SITE: ICD-10-CM

## 2024-08-08 DIAGNOSIS — I51.7 LEFT VENTRICULAR HYPERTROPHY: ICD-10-CM

## 2024-08-08 DIAGNOSIS — G56.03 BILATERAL CARPAL TUNNEL SYNDROME: ICD-10-CM

## 2024-08-08 LAB
ABSOLUTE LUNG FLUID CONTENT: 26 % (ref 20–35)
ANION GAP SERPL CALCULATED.3IONS-SCNC: 13.2 MMOL/L (ref 5–15)
BUN SERPL-MCNC: 20 MG/DL (ref 6–20)
BUN/CREAT SERPL: 21.3 (ref 7–25)
CALCIUM SPEC-SCNC: 9 MG/DL (ref 8.6–10.5)
CHLORIDE SERPL-SCNC: 101 MMOL/L (ref 98–107)
CO2 SERPL-SCNC: 19.8 MMOL/L (ref 22–29)
CREAT SERPL-MCNC: 0.94 MG/DL (ref 0.76–1.27)
EGFRCR SERPLBLD CKD-EPI 2021: 100.6 ML/MIN/1.73
GLUCOSE SERPL-MCNC: 129 MG/DL (ref 65–99)
MAGNESIUM SERPL-MCNC: 2 MG/DL (ref 1.6–2.6)
NT-PROBNP SERPL-MCNC: 2496 PG/ML (ref 0–450)
POTASSIUM SERPL-SCNC: 3.8 MMOL/L (ref 3.5–5.2)
SODIUM SERPL-SCNC: 134 MMOL/L (ref 136–145)

## 2024-08-08 PROCEDURE — 94726 PLETHYSMOGRAPHY LUNG VOLUMES: CPT | Performed by: PHYSICIAN ASSISTANT

## 2024-08-08 PROCEDURE — 99215 OFFICE O/P EST HI 40 MIN: CPT | Performed by: PHYSICIAN ASSISTANT

## 2024-08-08 PROCEDURE — 83735 ASSAY OF MAGNESIUM: CPT | Performed by: PHYSICIAN ASSISTANT

## 2024-08-08 PROCEDURE — 80048 BASIC METABOLIC PNL TOTAL CA: CPT | Performed by: PHYSICIAN ASSISTANT

## 2024-08-08 PROCEDURE — 36415 COLL VENOUS BLD VENIPUNCTURE: CPT | Performed by: PHYSICIAN ASSISTANT

## 2024-08-08 PROCEDURE — 83880 ASSAY OF NATRIURETIC PEPTIDE: CPT | Performed by: PHYSICIAN ASSISTANT

## 2024-08-08 NOTE — PROGRESS NOTES
Heart Failure Clinic    Date: 08/08/24     Vitals:    08/08/24 1333   BP: 123/79   Pulse: 85   SpO2: 90%   Weight: 365lbs    Method of arrival: Ambulatory    Weighing self daily: No    Monitoring Heart Failure Zones: No    Today's HF Zone: Green    Taking medications as prescribed: Yes    Edema Yes    Shortness of Air: No    Number of pillows used at night:<2    Educational Materials given:  AVS Given                                                                          ReDS Value: 26  25-35 Optimal Value Status      Jillian Wagner RN 08/08/24 13:34 EDT

## 2024-08-08 NOTE — PROGRESS NOTES
[facility]  ISSAC Rodriguez Bill J, MD  [address] [place] [address],  [place] [address]    Thank you for asking me to see Con Colón for congestive heart failure.    HPI:     This is a 49 y.o. male with known past medical history of:    Chronic HFpEF  TTE 07/2024 with EF unable to be completed 2/2 poor windows  TTE from July 14, 2024 with EF 66-70%/ mild concentric hypertrophy; moderate to severe dilation.    Atrial flutter with RVR recently diagnosed  Morbid obesity  Chronic troponin elevation  Suspected obesity hypoventilation syndrome with documented history of NELY  Diabetes type 2  Hyperlipidemia  Essential hypertension  History of left lower extremity DVT as well as pulmonary embolism on chronic anticoagulation  Carpel tunnel  OA      Con Colón presents for today for Heart Failure clinic evaluation.  The patient is typically seen by Halie Dubois APRN.  Patient's primary cardiologist is Dr. Chang & juan.     Last known EF 66-70%.   Last known hospitalization and/or ED visit:  July 25, 2024 through August 1, 2024 hosptialization with new onset aflutter and HFpEF  Accompanied by: Self          08/08/24 visit data/details regarding:   Dyspnea: Improved since hospital discharge  Lower extremity swelling: Improved but present  Abdominal swelling: Improving  Home weight: Weight monitoring booklet provided during initial visit; Needs scale; currently scales available in clinic only weigh those up to 350lbs.  Will check on ordering a different scale.   Home BP: BP monitoring booklet provided during initial visit; BP cuff provided.   Home heart rate: HR monitoring booklet provided during initial visit  Daily activities of living:  Performing on his own  Pillows/lying flat: 2 pillows in bed   HF zone: Green  Overall, Mr. Colón reports he is doing well.  He states he is chest pain free. His breathing and swelling have improved since hospital discharge.   He  is back to work currently at [employer] on night shift.    He rode his motorcycle to his appointment today.   He reports hx with OA & Carpel tunnel.            Review of Systems - Review of Systems   Constitutional: Negative for chills and fever.   HENT:  Negative for congestion and ear pain.    Eyes:  Negative for blurred vision and double vision.   Cardiovascular:  Positive for dyspnea on exertion.   Respiratory:  Positive for shortness of breath.    Endocrine: Negative for cold intolerance and heat intolerance.   Skin:  Negative for dry skin and nail changes.   Musculoskeletal:  Positive for arthritis.   Gastrointestinal:  Negative for bloating and abdominal pain.   Genitourinary:  Negative for bladder incontinence and decreased libido.   Neurological:  Negative for aphonia and brief paralysis.   Psychiatric/Behavioral:  Negative for altered mental status and depression.         All other systems were reviewed and were negative.    Patient Active Problem List   Diagnosis    Type 2 diabetes mellitus with hyperglycemia    Essential hypertension    Hyperlipidemia    Venous insufficiency    Anticoagulant long-term use    History of pulmonary embolus (PE)    Morbid obesity with BMI of 50.0-59.9, adult    History of DVT (deep vein thrombosis)    Chronic edema    NSTEMI (non-ST elevated myocardial infarction)    Chest pain    Heart failure, unspecified    Type 2 myocardial infarction    Chronic heart failure with preserved ejection fraction (HFpEF)    Acute respiratory failure with hypoxia       family history includes Diabetes in an other family member.     reports that he has quit smoking. His smokeless tobacco use includes chew. He reports that he does not drink alcohol and does not use drugs.    Allergies   Allergen Reactions    Nuvigil [Armodafinil] Palpitations    Provigil [Modafinil] Palpitations         Current Outpatient Medications:     albuterol sulfate  (90 Base) MCG/ACT inhaler, Inhale 2 puffs Every 6  (Six) Hours As Needed for Wheezing or Shortness of Air (COPD)., Disp: , Rfl:     aspirin 81 MG EC tablet, Take 1 tablet by mouth Daily., Disp: 30 tablet, Rfl: 0    budesonide-formoterol (SYMBICORT) 160-4.5 MCG/ACT inhaler, Inhale 2 puffs 2 (Two) Times a Day., Disp: , Rfl:     cetirizine (zyrTEC) 10 MG tablet, Take 1 tablet by mouth Daily., Disp: , Rfl:     cholecalciferol (VITAMIN D3) 1.25 MG (68993 UT) capsule, Take 1 capsule by mouth Every 7 (Seven) Days., Disp: , Rfl:     empagliflozin (JARDIANCE) 10 MG tablet tablet, Take 1 tablet by mouth Daily., Disp: 30 tablet, Rfl: 5    furosemide (LASIX) 40 MG tablet, Take 1 tablet by mouth 2 (Two) Times a Day., Disp: , Rfl:     metFORMIN (GLUCOPHAGE) 1000 MG tablet, Take 1 tablet by mouth 2 (Two) Times a Day With Meals., Disp: , Rfl:     rivaroxaban (XARELTO) 20 MG tablet, Take 1 tablet by mouth Daily With Dinner., Disp: , Rfl:     atorvastatin (LIPITOR) 40 MG tablet, Take 1 tablet by mouth Every Night. (Patient not taking: Reported on 8/8/2024), Disp: 30 tablet, Rfl: 0      Physical Exam:  I have reviewed the patient's current vital signs as documented in the patient's EMR.   Vitals:    08/08/24 1333   BP: 123/79   Pulse: 85   SpO2: 90%     Body mass index is 50.91 kg/m².       08/08/24  1333   Weight: (!) 166 kg (365 lb)      Physical Exam  Vitals reviewed.   Constitutional:       General: He is awake.      Appearance: Normal appearance. He is well-developed and well-groomed.   Eyes:      General: Lids are normal.      Conjunctiva/sclera:      Right eye: Right conjunctiva is not injected.      Left eye: Left conjunctiva is not injected.   Cardiovascular:      Rate and Rhythm: Normal rate and regular rhythm.   Pulmonary:      Breath sounds: No decreased breath sounds, wheezing, rhonchi or rales.   Musculoskeletal:      Right lower leg: No edema.      Left lower leg: No edema.   Neurological:      Mental Status: He is alert and oriented to person, place, and time.    Psychiatric:         Attention and Perception: Attention normal.         Mood and Affect: Mood normal.         Behavior: Behavior is cooperative.        JVP: Volume/Pulsation: WNL.        DATA REVIEWED:     ---------------------------------------------------  TTE/KIARRA:  Results for orders placed during the hospital encounter of 07/25/24    Adult Transthoracic Echo Limited W/ Cont if Necessary Per Protocol    Interpretation Summary    The right ventricular cavity is dilated.    The right atrial cavity is dilated.    Very limited study  poor acoustic window  Cannot comment about wall motion abnormality  Patient is tachycardic  Right ventricle is very dilated  Echo window is not adequate to comment about ejection fraction.  Endocardium thickening cannot be evaluated,  Will recommend repeat echo with contrast or try to obtain limited echo with multiple color that can expose the endocardium and ejection fraction can be evaluated        LAST HEART CATH/IF AVAILABLE:     No results found for this or any previous visit.      -----------------------------------------------------  CXR/Imaging:   Imaging Results (Most Recent)       None            I personally reviewed and interpreted the CXR.      -----------------------------------------------------  CT:   CT Angiogram Chest Pulmonary Embolism    Result Date: 7/25/2024  1.  No pulmonary embolism is identified.  Some of the distal pulmonary arteries cannot be evaluated due to suboptimal opacification. 2.  Patchy bilateral groundglass attenuation suggesting of small airways trapping. 3.  Cirrhotic nodular liver contour. Tiny ascites.   This report was finalized on 7/25/2024 1:34 PM by Dr. Miguel Dillon MD.      XR Chest 1 View    Result Date: 7/25/2024  1.  Cardiomegaly. 2.  Pulmonary vascular congestion. 3.  Coarsened.   This report was finalized on 7/25/2024 11:17 AM by Dr. Miguel Dillon MD.      CT Angiogram Heart With 3D Image    Result Date: 7/18/2024  1. Calcific plaque  in the mid circumflex producing a 50-69% stenosis (CAD RADS 3). 2. Calcific plaque in the proximal LAD and mid RCA producing a 25-49% stenosis (CAD RADS 2). 3. Calcific plaque in the proximal RCA producing a 1-24% stenosis (CAD RADS 1). 4. Coronary artery calcium score of 1201 which is in the  percentile for a patient of this age. 5. Mild wall thickening involving the left ventricle. 6. Moderate to severe dilatation of the right atrium and ventricle..  This report was finalized on 7/18/2024 3:40 PM by Laura Silver M.D..      XR Chest 1 View    Result Date: 7/18/2024  No acute cardiopulmonary process.   This report was finalized on 7/18/2024 10:55 AM by Laura Silver M.D..      US Venous Doppler Upper Extremity Left (duplex)    Result Date: 7/18/2024   1.  No DVT in the left upper extremity. 2.  No SVT in the left upper extremity. 3.  Probable ganglion cyst near the wrist measures 2.0 x 1.2 cm.  This report was finalized on 7/18/2024 5:55 AM by Rk Guerrero MD.      US Venous Doppler Lower Extremity Bilateral (duplex)    Result Date: 7/15/2024  No DVT in the lower extremities on today's exam.  This report was finalized on 7/15/2024 8:09 PM by Dr. Francis Hummel MD.      CT Upper Extremity Left Without Contrast    Result Date: 7/15/2024  4.3 x 1.0 cm subcutaneous inflammatory stranding in the proximal forearm which may be due to a hematoma.     This report was finalized on 7/15/2024 4:05 PM by Laura Silver M.D..     I personally reviewed the images of the CT scan.  My personal interpretation is below.      ----------------------------------------------------    --------------------------------------------------------------------------------------------------    Laboratory evaluations:    Lab Results   Component Value Date    GLUCOSE 129 (H) 08/08/2024    BUN 20 08/08/2024    CREATININE 0.94 08/08/2024    EGFRIFNONA 73 01/26/2022    BCR 21.3 08/08/2024    K 3.8 08/08/2024    CO2 19.8 (L)  "08/08/2024    CALCIUM 9.0 08/08/2024    ALBUMIN 3.5 07/30/2024    LABIL2 0.8 (L) 01/13/2021    AST 18 07/25/2024    ALT 14 07/25/2024     Lab Results   Component Value Date    WBC 8.58 07/29/2024    HGB 15.9 07/29/2024    HCT 50.6 07/29/2024    MCV 93.5 07/29/2024     (L) 07/29/2024     Lab Results   Component Value Date    CHOL 120 01/26/2022    TRIG 49 01/26/2022    HDL 32 (L) 01/26/2022    LDL 77 01/26/2022     Lab Results   Component Value Date    TSH 1.630 01/26/2022     Lab Results   Component Value Date    HGBA1C 6.50 (H) 07/15/2024     Lab Results   Component Value Date    ALT 14 07/25/2024     Lab Results   Component Value Date    HGBA1C 6.50 (H) 07/15/2024    HGBA1C 7.19 (H) 01/26/2022    HGBA1C 6.40 (H) 07/26/2021     Lab Results   Component Value Date    CREATININE 0.94 08/08/2024     No results found for: \"IRON\", \"TIBC\", \"FERRITIN\"  Lab Results   Component Value Date    INR 2.31 (H) 07/25/2024    INR 1.33 (H) 07/13/2024    INR 1.02 (L) 11/16/2020    PROTIME 25.4 (H) 07/25/2024    PROTIME 16.6 (H) 07/13/2024    PROTIME 11.0 11/16/2020        Lab Results   Component Value Date    ABSOLUTELUNG 26 08/08/2024    ABSOLUTELUNG 35 07/26/2024       PAH RISK ASSESSMENT:      1. Chronic heart failure with preserved ejection fraction (HFpEF)    2. Left ventricular hypertrophy    3. Paroxysmal atrial fibrillation    4. Bilateral carpal tunnel syndrome    5. Other type of osteoarthritis, unspecified site          ORDERS PLACED TODAY:  Orders Placed This Encounter   Procedures    ReDs Vesmingo    NM tumor localization limited    Basic Metabolic Panel    Magnesium    proBNP    Basic Metabolic Panel    Magnesium    proBNP        Diagnoses and all orders for this visit:    1. Chronic heart failure with preserved ejection fraction (HFpEF) (Primary)  -     Basic Metabolic Panel; Future  -     Magnesium; Future  -     proBNP; Future  -     Basic Metabolic Panel; Standing  -     Basic Metabolic Panel  -     Magnesium; " Standing  -     Magnesium  -     proBNP; Standing  -     proBNP  -     ReDs Vest  -     NM tumor localization limited; Future    2. Left ventricular hypertrophy  -     NM tumor localization limited; Future    3. Paroxysmal atrial fibrillation  -     NM tumor localization limited; Future    4. Bilateral carpal tunnel syndrome  -     NM tumor localization limited; Future    5. Other type of osteoarthritis, unspecified site  -     NM tumor localization limited; Future    Other orders  -     empagliflozin (JARDIANCE) 10 MG tablet tablet; Take 1 tablet by mouth Daily.  Dispense: 30 tablet; Refill: 5             MEDS ORDERED TODAY:    New Medications Ordered This Visit   Medications    empagliflozin (JARDIANCE) 10 MG tablet tablet     Sig: Take 1 tablet by mouth Daily.     Dispense:  30 tablet     Refill:  5        ---------------------------------------------------------------------------------------------------------------------------          ASSESSMENT/PLAN:      Diagnosis Plan   1. Chronic heart failure with preserved ejection fraction (HFpEF)  Basic Metabolic Panel    Magnesium    proBNP    Basic Metabolic Panel    Basic Metabolic Panel    Magnesium    Magnesium    proBNP    proBNP    ReDs Vest    NM tumor localization limited      2. Left ventricular hypertrophy  NM tumor localization limited      3. Paroxysmal atrial fibrillation  NM tumor localization limited      4. Bilateral carpal tunnel syndrome  NM tumor localization limited      5. Other type of osteoarthritis, unspecified site  NM tumor localization limited          not acutely decompensated chronic diastolic heart failure. CHF.     NYHA stage Stage C: Structural heart disease is present AND symptoms have occurredFC-Class II: Slight limitation of physical activity. Comfortable at rest Ordinary physical activity results in fatigue, palpitation, dyspnea (shortness of breath).     Today, Patient is approaching euvolemiaand with  Moderate perfusion. The  patient's hemodynamics are currently acceptable. HR is: normal and is at goal. BP/MAP was reviewed and there isroom for medication up-titration.  Clinical trajectory was assessed and haswaxed and waned.     CHF GOAL DIRECTED MEDICAL THERAPY FOR PATIENT ADDRESSED/ADJUSTED:     GDMT: HFpEF    Drug Class   Drug   Dose Last Dose Adjustment Notes   ACEi/ARB/ARNI       Beta Blocker       MRA       SGLT2i Jardiance 10mg qd  N/A   Secondaries if applicable:                -MRA:   Consider next visit.      -SGLT2 inhibitor therapy:   A BMP at initiation to verify GFR >30 was recommended, as was interval GFR surveillance.  The patient was advised to hold SGLT2I when PO intake is restricted due to a planned surgery, or due to an underlying illness.    Recommend continuing  Jardiance (empagliflozin) 10mg with quarterly assessment of GFR.      -Diuretic regimen:   ReDS Vest reading for. 08/09/24 is  26; ReDs Vest reading reviewed with patient.    Lasix 40mg BID.   See below  BMP, Mag, & ProBNP reviewed with patient.      -Fluid restriction/Sodium restriction:   Requested 2000 ml restriction  Patient has been asked to weigh daily and was provided with a printed diuretic strategy.  1,500 mg Na restriction was discussed.    -Devices if applicable:       -Acute vs. Chronic underlying conditions other than HF addressed during visit:   Mild concentric LV hypertrophy:  Carpel tunnel:   Afib:   OA:   Pursue Amyloidosis work-up.     Identifiable barriers to Heart Failure Self-care:   Medical Barriers:  No immediate known barriers  Social Barriers: Financial [place] of HF treatment        CONSIDERATIONS:   ------------------------------------------------------------------  ----------------------------------------------------------------------    -Sleep/Apnea:   Wishes to follow-up with PCP regarding sleep apnea work-up.     --------------------------------------------------------------------------------        Class 3 Severe Obesity (BMI  >=40). Obesity-related health conditions include the following: obstructive sleep apnea, hypertension, coronary heart disease, and diabetes mellitus. Obesity is improving with lifestyle modifications. BMI is is above average; BMI management plan is completed. We discussed portion control, increasing exercise, and Heart Failure dietary management .              >45 minutes out of 60 minutes face to face spent counseling patient extensively on dietary Na+ intake, importance of activity, weight monitoring, compliance with medications in addition to importance of titration with goal directed medical therapy and follow up appointments.            This document has been electronically signed by Imelda Kiser PA-C  August 9, 2024 14:47 EDT      Dictated Utilizing Dragon Dictation: Part of this note may be an electronic transcription/translation of spoken language to printed text using the Dragon Dictation System.    Follow-up appointment and medication changes provided in hand delivered After Visit Summary as well as reviewed in the room.

## 2024-08-13 ENCOUNTER — READMISSION MANAGEMENT (OUTPATIENT)
Dept: CALL CENTER | Facility: HOSPITAL | Age: 49
End: 2024-08-13
Payer: MEDICAID

## 2024-08-13 NOTE — OUTREACH NOTE
CHF Week 2 Survey      Flowsheet Row Responses   [facility] facility patient discharged from? [facility]   Does the patient have one of the following disease processes/diagnoses(primary or secondary)? CHF   Week 2 attempt successful? No   Unsuccessful attempts Attempt 1            Maryam IRVING - Registered Nurse

## 2024-08-13 NOTE — PROGRESS NOTES
Heart Failure Clinic  Pharmacist Note     Con Colón is a 49 y.o. male seen in the Heart Failure Clinic for HFpEF.  Con Colón reports a fair understanding of medications.  He tells me when he was a  he was 606 lbs and has made healthy lifestyle changes to now be 366lbs.  He does tell me he has some swelling today.  He is not weighing himself at home at this time.     He tells me he is taking Lasix 40mg twice a day.     Medication Use:   Hx of med intolerances:  None related to HF  Retail Rx Management: WAG    Past Medical History:   Diagnosis Date    Cardiomegaly     COPD (chronic obstructive pulmonary disease)     Hypertension     Pulmonary embolism     PAST    Sleep apnea      ALLERGIES: Nuvigil [armodafinil] and Provigil [modafinil]  Current Outpatient Medications   Medication Sig Dispense Refill    albuterol sulfate  (90 Base) MCG/ACT inhaler Inhale 2 puffs Every 6 (Six) Hours As Needed for Wheezing or Shortness of Air (COPD).      aspirin 81 MG EC tablet Take 1 tablet by mouth Daily. 30 tablet 0    budesonide-formoterol (SYMBICORT) 160-4.5 MCG/ACT inhaler Inhale 2 puffs 2 (Two) Times a Day.      cetirizine (zyrTEC) 10 MG tablet Take 1 tablet by mouth Daily.      cholecalciferol (VITAMIN D3) 1.25 MG (68221 UT) capsule Take 1 capsule by mouth Every 7 (Seven) Days.      empagliflozin (JARDIANCE) 10 MG tablet tablet Take 1 tablet by mouth Daily. 30 tablet 5    furosemide (LASIX) 40 MG tablet Take 1 tablet by mouth 2 (Two) Times a Day.      metFORMIN (GLUCOPHAGE) 1000 MG tablet Take 1 tablet by mouth 2 (Two) Times a Day With Meals.      rivaroxaban (XARELTO) 20 MG tablet Take 1 tablet by mouth Daily With Dinner.      atorvastatin (LIPITOR) 40 MG tablet Take 1 tablet by mouth Every Night. (Patient not taking: Reported on 8/8/2024) 30 tablet 0     No current facility-administered medications for this encounter.       Vaccination History:   Pneumonia:   Annual Influenza:   Shingles:  "    Objective  Vitals:    08/08/24 1333   BP: 123/79   BP Location: Right arm   Patient Position: Sitting   Cuff Size: Adult   Pulse: 85   SpO2: 90%   Weight: (!) 166 kg (365 lb)   Height: 180.3 cm (71\")     Wt Readings from Last 3 Encounters:   08/08/24 (!) 166 kg (365 lb)   08/01/24 (!) 169 kg (372 lb 3.2 oz)   07/18/24 (!) 172 kg (379 lb 3.1 oz)         08/08/24  1333   Weight: (!) 166 kg (365 lb)     Lab Results   Component Value Date    GLUCOSE 129 (H) 08/08/2024    BUN 20 08/08/2024    CREATININE 0.94 08/08/2024    EGFRIFNONA 73 01/26/2022    BCR 21.3 08/08/2024    K 3.8 08/08/2024    CO2 19.8 (L) 08/08/2024    CALCIUM 9.0 08/08/2024    ALBUMIN 3.5 07/30/2024    LABIL2 0.8 (L) 01/13/2021    AST 18 07/25/2024    ALT 14 07/25/2024     Lab Results   Component Value Date    WBC 8.58 07/29/2024    HGB 15.9 07/29/2024    HCT 50.6 07/29/2024    MCV 93.5 07/29/2024     (L) 07/29/2024     Lab Results   Component Value Date    TROPONINT 28 (H) 07/25/2024     Lab Results   Component Value Date    PROBNP 2,496.0 (H) 08/08/2024     Results for orders placed during the hospital encounter of 07/25/24    Adult Transthoracic Echo Limited W/ Cont if Necessary Per Protocol    Interpretation Summary    The right ventricular cavity is dilated.    The right atrial cavity is dilated.    Very limited study  poor acoustic window  Cannot comment about wall motion abnormality  Patient is tachycardic  Right ventricle is very dilated  Echo window is not adequate to comment about ejection fraction.  Endocardium thickening cannot be evaluated,  Will recommend repeat echo with contrast or try to obtain limited echo with multiple color that can expose the endocardium and ejection fraction can be evaluated         GDMT    Drug Class   Drug   Dose Last Dose Adjustment Additional Titration   Notes   ACEi/ARB/ARNI        Beta Blocker        MRA        SGLT2i Jardiance 10mg 7/16/24 N/A        Drug Therapy Problems    1. " GDMT    Recommendations:     Could consider addition of GLP1     Patient was educated on heart failure medications and the importance of medication adherence. All questions were addressed and patient expressed understanding.   Thank you for allowing me to participate in the care of your patient,    8/8/24  08:43 EDT

## 2024-08-13 NOTE — ADDENDUM NOTE
Encounter addended by: Michelle Amaro, PharmD on: 8/13/2024 8:49 AM   Actions taken: Specialty Pharmacy task added, Specialty Pharmacy task completed, Order Reconciliation Section accessed, Clinical Note Signed

## 2024-08-15 ENCOUNTER — OFFICE VISIT (OUTPATIENT)
Dept: CARDIOLOGY | Facility: CLINIC | Age: 49
End: 2024-08-15
Payer: MEDICAID

## 2024-08-15 VITALS
HEIGHT: 71 IN | OXYGEN SATURATION: 90 % | SYSTOLIC BLOOD PRESSURE: 116 MMHG | DIASTOLIC BLOOD PRESSURE: 65 MMHG | HEART RATE: 78 BPM | WEIGHT: 315 LBS | BODY MASS INDEX: 44.1 KG/M2

## 2024-08-15 DIAGNOSIS — I10 ESSENTIAL HYPERTENSION: ICD-10-CM

## 2024-08-15 DIAGNOSIS — I50.32 CHRONIC HEART FAILURE WITH PRESERVED EJECTION FRACTION (HFPEF): Primary | ICD-10-CM

## 2024-08-15 RX ORDER — ISOSORBIDE MONONITRATE 30 MG/1
30 TABLET, EXTENDED RELEASE ORAL DAILY
Qty: 30 TABLET | Refills: 11 | Status: SHIPPED | OUTPATIENT
Start: 2024-08-15

## 2024-08-15 NOTE — PROGRESS NOTES
Halie Dubois, JACEK  Con Colón  1975  08/15/2024    Patient Active Problem List   Diagnosis    Type 2 diabetes mellitus with hyperglycemia    Essential hypertension    Hyperlipidemia    Venous insufficiency    Anticoagulant long-term use    History of pulmonary embolus (PE)    Morbid obesity with BMI of 50.0-59.9, adult    History of DVT (deep vein thrombosis)    Chronic edema    NSTEMI (non-ST elevated myocardial infarction)    Chest pain    Heart failure, unspecified    Type 2 myocardial infarction    Chronic heart failure with preserved ejection fraction (HFpEF)    Acute respiratory failure with hypoxia       Dear Halie Dubois, JACEK:    Subjective     History of Present Illness:    Chief Complaint   Patient presents with    Follow-up     HOSPITAL F/U       Con Colón is a pleasant 49 y.o. male with a past medical history significant for coronary artery disease with recent CT coronary angiogram showing multivessel CAD with worst area being 50 to 70% stenosis in the left circumflex artery with total calcium score of 1200, HFpEF likely exacerbated by his obesity and potentially underlying sleep apnea.,  History of PE anticoagulated with Xarelto. He comes in for hospital follow up.     Con comes in after last visit I sent him to the ER he was hospitalized for acute on chronic HFpEF and new onset atrial flutter. Clinically evident reports he is doing better his breathing is back to baseline denies any worsening orthopnea or PND he reports he is tolerating Xarelto well which she was already on prior to admission due to history of PE.  He denies any known breakthrough episodes of atrial flutter since discharge.      Allergies   Allergen Reactions    Nuvigil [Armodafinil] Palpitations    Provigil [Modafinil] Palpitations   :      Current Outpatient Medications:     albuterol sulfate  (90 Base) MCG/ACT inhaler, Inhale 2 puffs Every 6 (Six) Hours As Needed for Wheezing or Shortness of Air  "(COPD)., Disp: , Rfl:     aspirin 81 MG EC tablet, Take 1 tablet by mouth Daily., Disp: 30 tablet, Rfl: 0    budesonide-formoterol (SYMBICORT) 160-4.5 MCG/ACT inhaler, Inhale 2 puffs 2 (Two) Times a Day., Disp: , Rfl:     cetirizine (zyrTEC) 10 MG tablet, Take 1 tablet by mouth Daily., Disp: , Rfl:     cholecalciferol (VITAMIN D3) 1.25 MG (76969 UT) capsule, Take 1 capsule by mouth Every 7 (Seven) Days., Disp: , Rfl:     empagliflozin (JARDIANCE) 10 MG tablet tablet, Take 1 tablet by mouth Daily., Disp: 30 tablet, Rfl: 5    furosemide (LASIX) 40 MG tablet, Take 1 tablet by mouth 2 (Two) Times a Day., Disp: , Rfl:     metFORMIN (GLUCOPHAGE) 1000 MG tablet, Take 1 tablet by mouth 2 (Two) Times a Day With Meals., Disp: , Rfl:     rivaroxaban (XARELTO) 20 MG tablet, Take 1 tablet by mouth Daily With Dinner., Disp: , Rfl:     atorvastatin (LIPITOR) 40 MG tablet, Take 1 tablet by mouth Every Night. (Patient not taking: Reported on 8/8/2024), Disp: 30 tablet, Rfl: 0    The following portions of the patient's history were reviewed and updated as appropriate: allergies, current medications, past family history, past medical history, past social history, past surgical history and problem list.    Social History     Tobacco Use    Smoking status: Former    Smokeless tobacco: Current     Types: Chew    Tobacco comments:     2019 QUIT   Vaping Use    Vaping status: Never Used    Passive vaping exposure: Yes   Substance Use Topics    Alcohol use: Never    Drug use: Never         Objective   Vitals:    08/15/24 1450   Height: 180.3 cm (71\")     Body mass index is 50.91 kg/m².    ROS    Constitutional:       General: Not in acute distress.     Appearance: Healthy appearance. Well-developed and not in distress. Not diaphoretic.   Eyes:      Conjunctiva/sclera: Conjunctivae normal.      Pupils: Pupils are equal, round, and reactive to light.   HENT:      Head: Normocephalic and atraumatic.   Neck:      Vascular: No carotid bruit or " "JVD.   Pulmonary:      Effort: Pulmonary effort is normal. No respiratory distress.      Breath sounds: Normal breath sounds.   Cardiovascular:      Normal rate. Regular rhythm.   Edema:     Peripheral edema absent.   Skin:     General: Skin is cool.   Neurological:      Mental Status: Alert, oriented to person, place, and time and oriented to person, place and time.         Lab Results   Component Value Date     (L) 08/08/2024    K 3.8 08/08/2024     08/08/2024    CO2 19.8 (L) 08/08/2024    BUN 20 08/08/2024    CREATININE 0.94 08/08/2024    GLUCOSE 129 (H) 08/08/2024    CALCIUM 9.0 08/08/2024    AST 18 07/25/2024    ALT 14 07/25/2024    ALKPHOS 113 07/25/2024    LABIL2 0.8 (L) 01/13/2021     No results found for: \"CKTOTAL\"  Lab Results   Component Value Date    WBC 8.58 07/29/2024    HGB 15.9 07/29/2024    HCT 50.6 07/29/2024     (L) 07/29/2024     Lab Results   Component Value Date    INR 2.31 (H) 07/25/2024    INR 1.33 (H) 07/13/2024    INR 1.02 (L) 11/16/2020     Lab Results   Component Value Date    MG 2.0 08/08/2024     Lab Results   Component Value Date    TSH 1.630 01/26/2022    TRIG 49 01/26/2022    HDL 32 (L) 01/26/2022    LDL 77 01/26/2022      Lab Results   Component Value Date    BNP 1152 (H) 01/13/2021       During this visit the following were done:  Labs Reviewed []    Labs Ordered []    Radiology Reports Reviewed []    Radiology Ordered []    PCP Records Reviewed []    Referring Provider Records Reviewed []    ER Records Reviewed []    Hospital Records Reviewed []    History Obtained From Family []    Radiology Images Reviewed []    Other Reviewed []    Records Requested []       Procedures    Assessment & Plan   No diagnosis found.         Recommendations:  Chronic HFpEF  Appears euvolemic today continue with Jardiance.  He reports he stopped lisinopril due to nausea.   Essential hypertension  Well controlled.  Paroxysmal atrial flutter  Maintaining sinus rhythm. Will continue " with xarelto.   CAD  He does report some exertional angina. Will start imdur 30 mg daily.   Continue aspirin and lipitor.     No follow-ups on file.    As always, I appreciate very much the opportunity to participate in the cardiovascular care of your patients.      With Best Regards,    Kirill Grover PA-C

## 2024-08-20 ENCOUNTER — READMISSION MANAGEMENT (OUTPATIENT)
Dept: CALL CENTER | Facility: HOSPITAL | Age: 49
End: 2024-08-20
Payer: MEDICAID

## 2024-08-20 NOTE — OUTREACH NOTE
CHF Week 3 Survey      Flowsheet Row Responses   [facility] facility patient discharged from? [facility]   Does the patient have one of the following disease processes/diagnoses(primary or secondary)? CHF   Week 3 attempt successful? No   Unsuccessful attempts Attempt 1            Faviola PERES - Licensed Nurse

## 2024-08-27 ENCOUNTER — PATIENT ROUNDING (BHMG ONLY) (OUTPATIENT)
Dept: CARDIOLOGY | Facility: CLINIC | Age: 49
End: 2024-08-27
Payer: MEDICAID

## 2024-08-27 ENCOUNTER — OFFICE VISIT (OUTPATIENT)
Dept: CARDIOLOGY | Facility: CLINIC | Age: 49
End: 2024-08-27
Payer: MEDICAID

## 2024-08-27 VITALS
SYSTOLIC BLOOD PRESSURE: 115 MMHG | HEART RATE: 87 BPM | OXYGEN SATURATION: 93 % | DIASTOLIC BLOOD PRESSURE: 76 MMHG | BODY MASS INDEX: 44.1 KG/M2 | WEIGHT: 315 LBS | HEIGHT: 71 IN

## 2024-08-27 DIAGNOSIS — R93.89 ABNORMAL COMPUTED TOMOGRAPHY ANGIOGRAPHY (CTA): Primary | ICD-10-CM

## 2024-08-27 DIAGNOSIS — I27.20 PULMONARY HYPERTENSION: ICD-10-CM

## 2024-08-27 PROCEDURE — 3074F SYST BP LT 130 MM HG: CPT | Performed by: NURSE PRACTITIONER

## 2024-08-27 PROCEDURE — 1160F RVW MEDS BY RX/DR IN RCRD: CPT | Performed by: NURSE PRACTITIONER

## 2024-08-27 PROCEDURE — 3078F DIAST BP <80 MM HG: CPT | Performed by: NURSE PRACTITIONER

## 2024-08-27 PROCEDURE — 99214 OFFICE O/P EST MOD 30 MIN: CPT | Performed by: NURSE PRACTITIONER

## 2024-08-27 PROCEDURE — 1159F MED LIST DOCD IN RCRD: CPT | Performed by: NURSE PRACTITIONER

## 2024-08-27 NOTE — PROGRESS NOTES
"Chief Complaint  Follow-up (Pt denies CP, some chest tightness COPD/Asthma,moderate leg/ankle swelling.) and Med Management (Tolerating all current medications with no side effects.)    Subjective          Con Colón presents to [facility] for follow up.    History of Present Illness  Mr. Colón presents to discuss the results of his recent coronary CTA which revealed a total calcium score of 1201 and multiple areas of stenosis.  The worst area of stenosis being in the left circumflex artery at 50 to 70%.  This was ordered by emergency room physicians assistant after he had presented and newfound atrial flutter.  He required cardioversion to restore sinus rhythm.  At that time he was complaining of chest pain and shortness of breath.    He reports that he still has chest tightness despite being on isosorbide mononitrate.    His last echocardiogram revealed severe right atrial dilation and an RVSP of 55 mmHg.  Discussed the indication for right heart catheterization as well.  Tobacco Use: High Risk (8/27/2024)    Patient History     Smoking Tobacco Use: Former     Smokeless Tobacco Use: Current     Passive Exposure: Not on file     Review of Systems - History obtained from the patient  General ROS: positive for  - sleep disturbance  negative for - chills or fever  Respiratory ROS: positive for - shortness of breath and wheezing  Cardiovascular ROS: positive for - dyspnea on exertion and irregular heartbeat  Gastrointestinal ROS: negative for - constipation or diarrhea  Musculoskeletal ROS: negative for - joint swelling or muscular weakness    Objective     Vital Signs:   /76 (BP Location: Left arm, Patient Position: Sitting, Cuff Size: Large Adult)   Pulse 87   Ht 180.3 cm (71\")   Wt (!) 167 kg (367 lb 3.2 oz)   SpO2 93%   BMI 51.21 kg/m²       Physical Exam  Vitals reviewed.   Constitutional:       General: He is not in acute distress.     Appearance: He is morbidly " obese.   HENT:      Head: Normocephalic and atraumatic.      Nose: Nose normal.   Eyes:      Conjunctiva/sclera: Conjunctivae normal.   Cardiovascular:      Rate and Rhythm: Normal rate and regular rhythm.      Heart sounds: No murmur heard.  Pulmonary:      Effort: Pulmonary effort is normal.      Breath sounds: Normal breath sounds.   Musculoskeletal:         General: Normal range of motion.      Cervical back: Normal range of motion.      Right lower leg: No edema.      Left lower leg: No edema.   Skin:     General: Skin is warm and dry.   Neurological:      General: No focal deficit present.      Mental Status: He is alert.   Psychiatric:         Mood and Affect: Mood normal.         Behavior: Behavior normal.          Result Review :   The following data was reviewed by: JACEK Canela on 08/27/2024:  Common labs          7/29/2024    01:27 7/30/2024    23:59 8/8/2024    13:52   Common Labs   Glucose 125  136  129    BUN 25  23  20    Creatinine 1.27  1.05  0.94    Sodium 141  140  134    Potassium 4.7  4.1  3.8    Chloride 106  104  101    Calcium 9.3  9.2  9.0    Albumin  3.5     WBC 8.58      Hemoglobin 15.9      Hematocrit 50.6      Platelets 132          Data reviewed : Cardiology studies as detailed below      Last coronary CTA  CT Angiogram Heart With 3D Image 07/18/2024    Narrative  PROCEDURE: CT ANGIOGRAM HEART W 3D IMAGE-    HISTORY: Persistent chest pain    COMPARISON: None .    TECHNIQUE: Multiple axial CT images were obtained from the aortic arch  branch vessels through the upper abdomen following the administration of  Isovue 300 per the CTA protocol. 3D MIP images were reconstructed from  the original axial data set. This study was performed with techniques to  keep radiation doses as low as reasonably achievable (ALARA).  Individualized dose reduction techniques using automated exposure  control or adjustment of mA and/or kV according to the patient size  were  employed.    FINDINGS:    Coronary Calcium Score (Agatson):  LM:    0  RCA: 413  PDA: 9  LAD: 558  LCX: 170  Other: 51    TOTAL: 1201    Coronary Angiography:    Left Main: The left main originates from the left coronary cusp. It is  normal in caliber with no calcified or soft plaque.    Left Anterior Descending: The LAD is patent. There is calcific plaque in  the proximal LAD producing a 25-49% stenosis (CAD RADS 2). The LAD gives  off 2 patent diagonal branches.    Ramus intermedius: Patent with no evidence of plaque or stenosis.    Left Circumflex: The LCX is patent. There is calcific plaque in the mid  circumflex which produces a 50-69% stenosis (CAD RADS 3). The LCX gives  off 2 patent obtuse marginal branches.    Right Coronary Artery: The RCA is patent. There is calcific plaque in  the proximal and mid RCA producing a 1-24% stenosis (CAD RADS 1)  proximally and a 25-49% stenosis (CAD RADS 2) in the mid portions. The  RCA terminates as a PDA and right posterolateral branch.    Left Atrium: The left atrium is normal in size with no filling defects.    Left Ventricle: The ventricular cavity size is within normal limits.  There are no stigmata of prior infarction. There are no filling defects.  There is mild ventricular wall thickening.    Right atrium: Moderate to severe dilatation.    Right ventricle: Moderate to severe dilatation.    Pulmonary Arteries: Normal with no filling defects.    Pulmonary Veins: Normal venous drainage.    Pericardium: Normal thickness with no significant effusion.    Cardiac Valves: There is no thickening or calcifications involving the  aortic or mitral valves.    Aorta: Normal in caliber with no evidence of dissection.    Lungs and Mediastinum: There is no evidence of a mediastinal mass or  adenopathy. The lungs are clear with no focal opacities or suspicious  pulmonary nodules.    Impression  1. Calcific plaque in the mid circumflex producing a 50-69% stenosis  (CAD RADS  3).  2. Calcific plaque in the proximal LAD and mid RCA producing a 25-49%  stenosis (CAD RADS 2).  3. Calcific plaque in the proximal RCA producing a 1-24% stenosis (CAD  RADS 1).  4. Coronary artery calcium score of 1201 which is in the   percentile for a patient of this age.  5. Mild wall thickening involving the left ventricle.  6. Moderate to severe dilatation of the right atrium and ventricle..    This report was finalized on 7/18/2024 3:40 PM by Lauar Silver M.D..        Last Stress test  Results for orders placed during the hospital encounter of 07/13/24    Stress Test With Myocardial Perfusion One Day    Interpretation Summary    Myocardial perfusion imaging indicates a normal myocardial perfusion study with no evidence of ischemia.    Left ventricular ejection fraction is normal (Calculated EF = 61%).    TID 1.12.    Findings consistent with a normal ECG stress test.       Last Echo  Results for orders placed during the hospital encounter of 07/25/24    Adult Transthoracic Echo Limited W/ Cont if Necessary Per Protocol    Interpretation Summary    The right ventricular cavity is dilated.    The right atrial cavity is dilated.    Very limited study  poor acoustic window  Cannot comment about wall motion abnormality  Patient is tachycardic  Right ventricle is very dilated  Echo window is not adequate to comment about ejection fraction.  Endocardium thickening cannot be evaluated,  Will recommend repeat echo with contrast or try to obtain limited echo with multiple color that can expose the endocardium and ejection fraction can be evaluated     ECHO 07/14/2024  Interpretation Summary         Left ventricular systolic function is normal. Calculated left ventricular EF = 69% Left ventricular ejection fraction appears to be 66 - 70%.    Left ventricular wall thickness is consistent with mild concentric hypertrophy.    The right ventricular cavity is moderate to severely dilated.    The left atrial  cavity is mildly dilated.    Left atrial volume is mildly increased.    The right atrial cavity is severely  dilated.    Estimated right ventricular systolic pressure from tricuspid regurgitation is markedly elevated (>55 mmHg). Calculated right ventricular systolic pressure from tricuspid regurgitation is 55 mmHg.    The aortic root measures 3.7 cm.        Current Outpatient Medications   Medication Sig Dispense Refill    albuterol sulfate  (90 Base) MCG/ACT inhaler Inhale 2 puffs Every 6 (Six) Hours As Needed for Wheezing or Shortness of Air (COPD).      aspirin 81 MG EC tablet Take 1 tablet by mouth Daily. 30 tablet 0    atorvastatin (LIPITOR) 40 MG tablet Take 1 tablet by mouth Every Night. 30 tablet 0    budesonide-formoterol (SYMBICORT) 160-4.5 MCG/ACT inhaler Inhale 2 puffs 2 (Two) Times a Day.      cholecalciferol (VITAMIN D3) 1.25 MG (74467 UT) capsule Take 1 capsule by mouth Every 7 (Seven) Days.      empagliflozin (JARDIANCE) 10 MG tablet tablet Take 1 tablet by mouth Daily. 30 tablet 5    furosemide (LASIX) 40 MG tablet Take 1 tablet by mouth 2 (Two) Times a Day.      metFORMIN (GLUCOPHAGE) 1000 MG tablet Take 1 tablet by mouth 2 (Two) Times a Day With Meals.      rivaroxaban (XARELTO) 20 MG tablet Take 1 tablet by mouth Daily With Dinner.      isosorbide mononitrate (IMDUR) 30 MG 24 hr tablet Take 1 tablet by mouth Daily. 30 tablet 11     No current facility-administered medications for this visit.            Assessment and Plan    Problem List Items Addressed This Visit    None  Visit Diagnoses       Abnormal computed tomography angiography (CTA)    -  Primary    Relevant Orders    Case Request Cath Lab: Left Heart Cath (Completed)    CBC (No Diff)    Basic Metabolic Panel    Pulmonary hypertension        Relevant Orders    Case Request Cath Lab: Right Heart Cath (Completed)          Diagnoses and all orders for this visit:    1. Abnormal computed tomography angiography (CTA) (Primary)  -      Case Request Cath Lab: Left Heart Cath  -     CBC (No Diff); Future  -     Basic Metabolic Panel; Future    2. Pulmonary hypertension  -     Case Request Cath Lab: Right Heart Cath        Risks and benefits of the procedure discussed with the patient.  Risks specifically mentioned included bruising/hematoma, bleeding, infection, allergic reaction to medications, renal injury, dysrhythmia, blood clot, heart attack, and stroke.      Follow Up     No follow-ups on file.    Patient was given instructions and counseling regarding his condition or for health maintenance advice. Please see specific information pulled into the AVS if appropriate.       Electronically signed by JACEK Canela, 08/28/24, 11:18 AM EDT.      Dictated Utilizing Dragon Dictation: Part of this note may be an electronic transcription/translation of spoken language to printed text using the Dragon Dictation System

## 2024-08-27 NOTE — PROGRESS NOTES
A Diditz message has been sent to the patient for patient rounding for [facility]

## 2024-09-03 PROBLEM — R93.89 ABNORMAL COMPUTED TOMOGRAPHY ANGIOGRAPHY (CTA): Status: ACTIVE | Noted: 2024-08-27

## 2024-09-04 ENCOUNTER — TELEPHONE (OUTPATIENT)
Dept: CARDIOLOGY | Facility: CLINIC | Age: 49
End: 2024-09-04
Payer: MEDICAID

## 2024-09-04 NOTE — TELEPHONE ENCOUNTER
Called to reschedule heart cath since we got the approval, patient stated he would just me back when he wanted to reschedule

## 2024-11-18 ENCOUNTER — HOSPITAL ENCOUNTER (OUTPATIENT)
Dept: CARDIOLOGY | Facility: HOSPITAL | Age: 49
Discharge: HOME | End: 2024-11-18
Admitting: PHYSICIAN ASSISTANT
Payer: MEDICAID

## 2024-11-18 VITALS
BODY MASS INDEX: 44.1 KG/M2 | WEIGHT: 315 LBS | HEART RATE: 91 BPM | HEIGHT: 71 IN | SYSTOLIC BLOOD PRESSURE: 143 MMHG | OXYGEN SATURATION: 90 % | DIASTOLIC BLOOD PRESSURE: 85 MMHG

## 2024-11-18 DIAGNOSIS — I50.32 CHRONIC HEART FAILURE WITH PRESERVED EJECTION FRACTION (HFPEF): Primary | ICD-10-CM

## 2024-11-18 LAB
ABSOLUTE LUNG FLUID CONTENT: 36 % (ref 20–35)
ANION GAP SERPL CALCULATED.3IONS-SCNC: 10.8 MMOL/L (ref 5–15)
BUN SERPL-MCNC: 19 MG/DL (ref 6–20)
BUN/CREAT SERPL: 17.4 (ref 7–25)
CALCIUM SPEC-SCNC: 9 MG/DL (ref 8.6–10.5)
CHLORIDE SERPL-SCNC: 105 MMOL/L (ref 98–107)
CO2 SERPL-SCNC: 26.2 MMOL/L (ref 22–29)
CREAT SERPL-MCNC: 1.09 MG/DL (ref 0.76–1.27)
EGFRCR SERPLBLD CKD-EPI 2021: 84.2 ML/MIN/1.73
GLUCOSE SERPL-MCNC: 99 MG/DL (ref 65–99)
MAGNESIUM SERPL-MCNC: 2.1 MG/DL (ref 1.6–2.6)
NT-PROBNP SERPL-MCNC: 1005 PG/ML (ref 0–450)
POTASSIUM SERPL-SCNC: 4.1 MMOL/L (ref 3.5–5.2)
SODIUM SERPL-SCNC: 142 MMOL/L (ref 136–145)

## 2024-11-18 PROCEDURE — 36415 COLL VENOUS BLD VENIPUNCTURE: CPT | Performed by: PHYSICIAN ASSISTANT

## 2024-11-18 PROCEDURE — 99215 OFFICE O/P EST HI 40 MIN: CPT | Performed by: PHYSICIAN ASSISTANT

## 2024-11-18 PROCEDURE — 83735 ASSAY OF MAGNESIUM: CPT | Performed by: PHYSICIAN ASSISTANT

## 2024-11-18 PROCEDURE — 80048 BASIC METABOLIC PNL TOTAL CA: CPT | Performed by: PHYSICIAN ASSISTANT

## 2024-11-18 PROCEDURE — 94726 PLETHYSMOGRAPHY LUNG VOLUMES: CPT | Performed by: PHYSICIAN ASSISTANT

## 2024-11-18 PROCEDURE — 83880 ASSAY OF NATRIURETIC PEPTIDE: CPT | Performed by: PHYSICIAN ASSISTANT

## 2024-11-18 NOTE — PROGRESS NOTES
Heart Failure Clinic  Pharmacist Note     Con Colón is a 49 y.o. male seen in the Heart Failure Clinic for HFpEF.  Con Colón reports a fair understanding of medications.  He reports he was discontinued off two medications at last discharge from hospital (metoprolol and lisinopril).  He reports some history of LE swelling. He reports not checking blood pressure at home due to not having a blood pressure cuff.     He tells me he is taking Lasix 40mg once daily.    Medication Use:   Hx of med intolerances:  None related to HF  Retail Rx Management: WAG    Past Medical History:   Diagnosis Date    Cardiomegaly     COPD (chronic obstructive pulmonary disease)     Hypertension     Pulmonary embolism     PAST    Sleep apnea      ALLERGIES: Nuvigil [armodafinil] and Provigil [modafinil]  Current Outpatient Medications   Medication Sig Dispense Refill    albuterol sulfate  (90 Base) MCG/ACT inhaler Inhale 2 puffs Every 6 (Six) Hours As Needed for Wheezing or Shortness of Air (COPD).      aspirin 81 MG EC tablet Take 1 tablet by mouth Daily. 30 tablet 0    budesonide-formoterol (SYMBICORT) 160-4.5 MCG/ACT inhaler Inhale 2 puffs 2 (Two) Times a Day. (Patient taking differently: Inhale 2 puffs 2 (Two) Times a Day As Needed.)      empagliflozin (JARDIANCE) 10 MG tablet tablet Take 1 tablet by mouth Daily. 30 tablet 5    furosemide (LASIX) 40 MG tablet Take 1 tablet by mouth 2 (Two) Times a Day. (Patient taking differently: Take 1 tablet by mouth Daily.)      metFORMIN (GLUCOPHAGE) 1000 MG tablet Take 1 tablet by mouth 2 (Two) Times a Day With Meals.      rivaroxaban (XARELTO) 20 MG tablet Take 1 tablet by mouth Daily With Dinner.      atorvastatin (LIPITOR) 40 MG tablet Take 1 tablet by mouth Every Night. (Patient not taking: Reported on 11/18/2024) 30 tablet 0    cholecalciferol (VITAMIN D3) 1.25 MG (29981 UT) capsule Take 1 capsule by mouth Every 7 (Seven) Days. (Patient not taking: Reported on 11/18/2024)    "   isosorbide mononitrate (IMDUR) 30 MG 24 hr tablet Take 1 tablet by mouth Daily. 30 tablet 11     No current facility-administered medications for this encounter.       Vaccination History: ask at next visit    Objective  Vitals:    11/18/24 1024   BP: 143/85   BP Location: Left arm   Patient Position: Sitting   Cuff Size: Adult   Pulse: 91   SpO2: 90%   Weight: (!) 168 kg (371 lb 6.4 oz)   Height: 180.3 cm (71\")     Wt Readings from Last 3 Encounters:   11/18/24 (!) 168 kg (371 lb 6.4 oz)   08/27/24 (!) 167 kg (367 lb 3.2 oz)   08/15/24 (!) 162 kg (357 lb 3.2 oz)         11/18/24  1024   Weight: (!) 168 kg (371 lb 6.4 oz)     Lab Results   Component Value Date    GLUCOSE 129 (H) 08/08/2024    BUN 20 08/08/2024    CREATININE 0.94 08/08/2024    EGFRIFNONA 73 01/26/2022    BCR 21.3 08/08/2024    K 3.8 08/08/2024    CO2 19.8 (L) 08/08/2024    CALCIUM 9.0 08/08/2024    ALBUMIN 3.5 07/30/2024    LABIL2 0.8 (L) 01/13/2021    AST 18 07/25/2024    ALT 14 07/25/2024     Lab Results   Component Value Date    WBC 8.58 07/29/2024    HGB 15.9 07/29/2024    HCT 50.6 07/29/2024    MCV 93.5 07/29/2024     (L) 07/29/2024     Lab Results   Component Value Date    TROPONINT 28 (H) 07/25/2024     Lab Results   Component Value Date    PROBNP 2,496.0 (H) 08/08/2024     Results for orders placed during the hospital encounter of 07/25/24    Adult Transthoracic Echo Limited W/ Cont if Necessary Per Protocol    Interpretation Summary    The right ventricular cavity is dilated.    The right atrial cavity is dilated.    Very limited study  poor acoustic window  Cannot comment about wall motion abnormality  Patient is tachycardic  Right ventricle is very dilated  Echo window is not adequate to comment about ejection fraction.  Endocardium thickening cannot be evaluated,  Will recommend repeat echo with contrast or try to obtain limited echo with multiple color that can expose the endocardium and ejection fraction can be evaluated     "     GDMT    Drug Class   Drug   Dose Last Dose Adjustment Additional Titration   Notes   ACEi/ARB/ARNI        Beta Blocker        MRA        SGLT2i Jardiance 10mg 7/16/24 N/A        Drug Therapy Problems    1. GDMT    Recommendations:     Continue Jardiance 10mg daily.     Patient was educated on heart failure medications and the importance of medication adherence. All questions were addressed and patient expressed understanding.   Thank you for allowing me to participate in the care of your patient,    Randee Robertson [facility]   12:52 EST  11/18/24

## 2024-11-18 NOTE — PROGRESS NOTES
[facility]  Imelda Kiser PA-C       Referring, [place] [address]    Thank you for asking me to see Con Colón for congestive heart failure.    HPI:     This is a 49 y.o. male with known past medical history of:    Chronic HFpEF  TTE 07/2024 with EF unable to be completed 2/2 poor windows  TTE from July 14, 2024 with EF 66-70%/ mild concentric hypertrophy; moderate to severe dilation.    Atrial flutter with RVR recently diagnosed  Morbid obesity  Chronic troponin elevation  Suspected obesity hypoventilation syndrome with documented history of NELY  Diabetes type 2  Hyperlipidemia  Essential hypertension  History of left lower extremity DVT as well as pulmonary embolism on chronic anticoagulation  Carpel tunnel  OA      Con Colón presents for today for Heart Failure clinic evaluation.  The patient is typically seen by Halie Dubois APRN.  Patient's primary cardiologist is Dr. Chang & juan.     Last known EF 66-70%.   Last known hospitalization and/or ED visit:  July 25, 2024 through August 1, 2024 hosptialization with new onset aflutter and HFpEF  Accompanied by: Self          11/18/2024 visit data/details regarding:   Dyspnea: Improved since hospital discharge  Lower extremity swelling: Improved but present  Abdominal swelling: Improving  Home weight: Weight monitoring booklet provided during initial visit; Needs scale; Scale provided  Home BP: BP monitoring booklet provided during initial visit; BP cuff provided.   Home heart rate: HR monitoring booklet provided during initial visit  Daily activities of living:  Performing on his own  Pillows/lying flat: 2 pillows in bed   HF zone: Green  Overall, Mr. Colón reports he is chest pain free and doing well.   His initial BP was elevated but when rechecked after he sat down it improved to 125/73.    He reports he does not want to get a heart catheterization for evaluation of CAD after  high calcium score secondary to risks associated with heart cath.  We discuss risks also associated with undiagnosed heart blockages.    Patient wishes to continue following with Kirill Grover PA-C.           Review of Systems - Review of Systems   Constitutional: Negative for chills and fever.   HENT:  Negative for congestion and ear pain.    Eyes:  Negative for blurred vision and double vision.   Cardiovascular:  Positive for dyspnea on exertion.   Respiratory:  Positive for shortness of breath.    Endocrine: Negative for cold intolerance and heat intolerance.   Skin:  Negative for dry skin and nail changes.   Musculoskeletal:  Positive for arthritis.   Gastrointestinal:  Negative for bloating and abdominal pain.   Genitourinary:  Negative for bladder incontinence and decreased libido.   Neurological:  Negative for aphonia and brief paralysis.   Psychiatric/Behavioral:  Negative for altered mental status and depression.         All other systems were reviewed and were negative.    Patient Active Problem List   Diagnosis    Type 2 diabetes mellitus with hyperglycemia    Essential hypertension    Hyperlipidemia    Venous insufficiency    Anticoagulant long-term use    History of pulmonary embolus (PE)    Morbid obesity with BMI of 50.0-59.9, adult    History of DVT (deep vein thrombosis)    Chronic edema    NSTEMI (non-ST elevated myocardial infarction)    Chest pain    Heart failure, unspecified    Type 2 myocardial infarction    Chronic heart failure with preserved ejection fraction (HFpEF)    Acute respiratory failure with hypoxia    Abnormal computed tomography angiography (CTA)       family history includes Diabetes in an other family member.     reports that he has quit smoking. His smokeless tobacco use includes chew. He reports that he does not drink alcohol and does not use drugs.    Allergies   Allergen Reactions    Nuvigil [Armodafinil] Palpitations    Provigil [Modafinil] Palpitations         Current  Outpatient Medications:     albuterol sulfate  (90 Base) MCG/ACT inhaler, Inhale 2 puffs Every 6 (Six) Hours As Needed for Wheezing or Shortness of Air (COPD)., Disp: , Rfl:     aspirin 81 MG EC tablet, Take 1 tablet by mouth Daily., Disp: 30 tablet, Rfl: 0    atorvastatin (LIPITOR) 40 MG tablet, Take 1 tablet by mouth Every Night., Disp: 30 tablet, Rfl: 0    budesonide-formoterol (SYMBICORT) 160-4.5 MCG/ACT inhaler, Inhale 2 puffs 2 (Two) Times a Day., Disp: , Rfl:     cholecalciferol (VITAMIN D3) 1.25 MG (46118 UT) capsule, Take 1 capsule by mouth Every 7 (Seven) Days., Disp: , Rfl:     empagliflozin (JARDIANCE) 10 MG tablet tablet, Take 1 tablet by mouth Daily., Disp: 30 tablet, Rfl: 5    furosemide (LASIX) 40 MG tablet, Take 1 tablet by mouth 2 (Two) Times a Day., Disp: , Rfl:     isosorbide mononitrate (IMDUR) 30 MG 24 hr tablet, Take 1 tablet by mouth Daily., Disp: 30 tablet, Rfl: 11    metFORMIN (GLUCOPHAGE) 1000 MG tablet, Take 1 tablet by mouth 2 (Two) Times a Day With Meals., Disp: , Rfl:     rivaroxaban (XARELTO) 20 MG tablet, Take 1 tablet by mouth Daily With Dinner., Disp: , Rfl:       Physical Exam:  I have reviewed the patient's current vital signs as documented in the patient's EMR.   Vitals:    11/18/24 1024   BP: 143/85   Pulse: 91   SpO2: 90%       Body mass index is 51.8 kg/m².       11/18/24  1024   Weight: (!) 168 kg (371 lb 6.4 oz)        Physical Exam  Vitals reviewed.   Constitutional:       General: He is awake.      Appearance: Normal appearance. He is well-developed and well-groomed.   Eyes:      General: Lids are normal.      Conjunctiva/sclera:      Right eye: Right conjunctiva is not injected.      Left eye: Left conjunctiva is not injected.   Cardiovascular:      Rate and Rhythm: Normal rate and regular rhythm.   Pulmonary:      Breath sounds: No decreased breath sounds, wheezing, rhonchi or rales.   Musculoskeletal:      Right lower leg: No edema.      Left lower leg: No  edema.   Neurological:      Mental Status: He is alert and oriented to person, place, and time.   Psychiatric:         Attention and Perception: Attention normal.         Mood and Affect: Mood normal.         Behavior: Behavior is cooperative.          JVP: Volume/Pulsation: WNL.        DATA REVIEWED:     ---------------------------------------------------  TTE/KIARRA:  Results for orders placed during the hospital encounter of 07/25/24    Adult Transthoracic Echo Limited W/ Cont if Necessary Per Protocol    Interpretation Summary    The right ventricular cavity is dilated.    The right atrial cavity is dilated.    Very limited study  poor acoustic window  Cannot comment about wall motion abnormality  Patient is tachycardic  Right ventricle is very dilated  Echo window is not adequate to comment about ejection fraction.  Endocardium thickening cannot be evaluated,  Will recommend repeat echo with contrast or try to obtain limited echo with multiple color that can expose the endocardium and ejection fraction can be evaluated        LAST HEART CATH/IF AVAILABLE:     No results found for this or any previous visit.      -----------------------------------------------------  CXR/Imaging:   Imaging Results (Most Recent)       None            I personally reviewed and interpreted the CXR.      -----------------------------------------------------  CT:   No radiology results for the last 30 days.  I personally reviewed the images of the CT scan.  My personal interpretation is below.      ----------------------------------------------------    --------------------------------------------------------------------------------------------------    Laboratory evaluations:    Lab Results   Component Value Date    GLUCOSE 129 (H) 08/08/2024    BUN 20 08/08/2024    CREATININE 0.94 08/08/2024    EGFRIFNONA 73 01/26/2022    BCR 21.3 08/08/2024    K 3.8 08/08/2024    CO2 19.8 (L) 08/08/2024    CALCIUM 9.0 08/08/2024    ALBUMIN 3.5  "07/30/2024    LABIL2 0.8 (L) 01/13/2021    AST 18 07/25/2024    ALT 14 07/25/2024     Lab Results   Component Value Date    WBC 8.58 07/29/2024    HGB 15.9 07/29/2024    HCT 50.6 07/29/2024    MCV 93.5 07/29/2024     (L) 07/29/2024     Lab Results   Component Value Date    CHOL 120 01/26/2022    TRIG 49 01/26/2022    HDL 32 (L) 01/26/2022    LDL 77 01/26/2022     Lab Results   Component Value Date    TSH 1.630 01/26/2022     Lab Results   Component Value Date    HGBA1C 6.50 (H) 07/15/2024     Lab Results   Component Value Date    ALT 14 07/25/2024     Lab Results   Component Value Date    HGBA1C 6.50 (H) 07/15/2024    HGBA1C 7.19 (H) 01/26/2022    HGBA1C 6.40 (H) 07/26/2021     Lab Results   Component Value Date    CREATININE 0.94 08/08/2024     No results found for: \"IRON\", \"TIBC\", \"FERRITIN\"  Lab Results   Component Value Date    INR 2.31 (H) 07/25/2024    INR 1.33 (H) 07/13/2024    INR 1.02 (L) 11/16/2020    PROTIME 25.4 (H) 07/25/2024    PROTIME 16.6 (H) 07/13/2024    PROTIME 11.0 11/16/2020        Lab Results   Component Value Date    ABSOLUTELUNG 36 (A) 11/18/2024    ABSOLUTELUNG 26 08/08/2024    ABSOLUTELUNG 35 07/26/2024       PAH RISK ASSESSMENT:      1. Chronic heart failure with preserved ejection fraction (HFpEF)          ORDERS PLACED TODAY:  Orders Placed This Encounter   Procedures    ReDs Vest    Basic Metabolic Panel    Magnesium    proBNP    Basic Metabolic Panel    Magnesium    proBNP        Diagnoses and all orders for this visit:    1. Chronic heart failure with preserved ejection fraction (HFpEF) (Primary)  -     Basic Metabolic Panel; Future  -     Magnesium; Future  -     proBNP; Future  -     Basic Metabolic Panel; Standing  -     Basic Metabolic Panel  -     Magnesium; Standing  -     Magnesium  -     proBNP; Standing  -     proBNP  -     ReDs Vest             MEDS ORDERED TODAY:    No orders of the defined types were placed in this encounter.   "     ---------------------------------------------------------------------------------------------------------------------------          ASSESSMENT/PLAN:      Diagnosis Plan   1. Chronic heart failure with preserved ejection fraction (HFpEF)  Basic Metabolic Panel    Magnesium    proBNP    Basic Metabolic Panel    Basic Metabolic Panel    Magnesium    Magnesium    proBNP    proBNP    ReDs Vest          not acutely decompensated chronic diastolic heart failure. CHF.     NYHA stage Stage C: Structural heart disease is present AND symptoms have occurredFC-Class II: Slight limitation of physical activity. Comfortable at rest Ordinary physical activity results in fatigue, palpitation, dyspnea (shortness of breath).     Today, Patient is approaching euvolemiaand with  Moderate perfusion. The patient's hemodynamics are currently acceptable. HR is: normal and is at goal. BP/MAP was reviewed and there isroom for medication up-titration.  Clinical trajectory was assessed and haswaxed and waned.     CHF GOAL DIRECTED MEDICAL THERAPY FOR PATIENT ADDRESSED/ADJUSTED:     GDMT: HFpEF    Drug Class   Drug   Dose Last Dose Adjustment Notes   ACEi/ARB/ARNI       Beta Blocker       MRA       SGLT2i Jardiance 10mg qd  N/A   Secondaries if applicable:                -MRA:   Consider next visit.      -SGLT2 inhibitor therapy:   A BMP at initiation to verify GFR >30 was recommended, as was interval GFR surveillance.  The patient was advised to hold SGLT2I when PO intake is restricted due to a planned surgery, or due to an underlying illness.    Recommend continuing  Jardiance (empagliflozin) 10mg with quarterly assessment of GFR.      -Diuretic regimen:   ReDS Vest reading for. 11/18/24 is  36; ReDs Vest reading reviewed with patient.    Lasix 40mgBID-taking qd at present.    BMP, Mag, & ProBNP reviewed with patient.      -Fluid restriction/Sodium restriction:   Requested 2000 ml restriction  Patient has been asked to weigh daily and was  provided with a printed diuretic strategy.  1,500 mg Na restriction was discussed.    -Devices if applicable:       -Acute vs. Chronic underlying conditions other than HF addressed during visit:   High Calcium score on CT Angio Heart:   Continue f/u with [facility].   Risks of CAD discussed including MI and sudden cardiac death.      Identifiable barriers to Heart Failure Self-care:   Medical Barriers:  No immediate known barriers  Social Barriers: Financial [place] of HF treatment        CONSIDERATIONS:   ------------------------------------------------------------------  ----------------------------------------------------------------------    -Sleep/Apnea:   Wishes to follow-up with PCP regarding sleep apnea work-up.     --------------------------------------------------------------------------------        Class 3 Severe Obesity (BMI >=40). Obesity-related health conditions include the following: obstructive sleep apnea, hypertension, coronary heart disease, and diabetes mellitus. Obesity is improving with lifestyle modifications. BMI is is above average; BMI management plan is completed. We discussed portion control, increasing exercise, and Heart Failure dietary management .        RTC PRN and continue f/u with [facility].       >45 minutes out of 60 minutes face to face spent counseling patient extensively on dietary Na+ intake, importance of activity, weight monitoring, compliance with medications in addition to importance of titration with goal directed medical therapy and follow up appointments.            This document has been electronically signed by Imelda Kiser PA-C  November 18, 2024 10:30 EST      Dictated Utilizing Dragon Dictation: Part of this note may be an electronic transcription/translation of spoken language to printed text using the Dragon Dictation System.    Follow-up appointment and medication changes provided in hand delivered After Visit Summary as well as reviewed in the room.

## 2024-11-18 NOTE — PROGRESS NOTES
Heart Failure Clinic    Date: 11/18/24     Vitals:    11/18/24 1024   BP: 143/85   Pulse: 91   SpO2: 90%    Weight 371.4    Method of arrival: Ambulatory    Weighing self daily: No    Monitoring Heart Failure Zones: Most days    Today's HF Zone: Green    Taking medications as prescribed: Yes    Edema No    Shortness of Air: No    Number of pillows used at night:<2    Educational Materials given:  avs                                                                         ReDS Value: 36  36-41 Possible Hypervolemic Status      John Clemente RN 11/18/24 10:25 EST